# Patient Record
Sex: MALE | Race: WHITE | Employment: OTHER | URBAN - NONMETROPOLITAN AREA
[De-identification: names, ages, dates, MRNs, and addresses within clinical notes are randomized per-mention and may not be internally consistent; named-entity substitution may affect disease eponyms.]

---

## 2017-01-03 PROBLEM — N18.30 CHRONIC RENAL FAILURE, STAGE 3 (MODERATE) (HCC): Status: ACTIVE | Noted: 2017-01-03

## 2017-01-03 PROBLEM — R73.9 HYPERGLYCEMIA: Status: ACTIVE | Noted: 2017-01-03

## 2017-01-03 PROBLEM — E78.5 HYPERLIPIDEMIA: Status: ACTIVE | Noted: 2017-01-03

## 2017-03-29 ENCOUNTER — HOSPITAL ENCOUNTER (OUTPATIENT)
Age: 82
Discharge: HOME | End: 2017-03-29
Payer: MEDICARE

## 2017-03-29 DIAGNOSIS — E78.49 OTHER HYPERLIPIDEMIA: ICD-10-CM

## 2017-03-29 DIAGNOSIS — N18.30 CHRONIC RENAL FAILURE, STAGE 3 (MODERATE) (HCC): ICD-10-CM

## 2017-03-29 DIAGNOSIS — R73.9 HYPERGLYCEMIA: ICD-10-CM

## 2017-03-29 DIAGNOSIS — E03.8 OTHER SPECIFIED HYPOTHYROIDISM: ICD-10-CM

## 2017-03-29 LAB
ALT SERPL-CCNC: 18 U/L (ref 5–41)
ANION GAP SERPL CALCULATED.3IONS-SCNC: 14 MMOL/L (ref 9–17)
AST SERPL-CCNC: 21 U/L
BUN BLDV-MCNC: 29 MG/DL (ref 8–23)
BUN/CREAT BLD: 15 (ref 9–20)
CALCIUM SERPL-MCNC: 9.6 MG/DL (ref 8.6–10.4)
CHLORIDE BLD-SCNC: 109 MMOL/L (ref 98–107)
CO2: 23 MMOL/L (ref 20–31)
CREAT SERPL-MCNC: 1.95 MG/DL (ref 0.7–1.2)
CREATININE URINE: 70.3 MG/DL (ref 39–259)
ESTIMATED AVERAGE GLUCOSE: 134 MG/DL
GFR AFRICAN AMERICAN: 40 ML/MIN
GFR NON-AFRICAN AMERICAN: 33 ML/MIN
GFR SERPL CREATININE-BSD FRML MDRD: ABNORMAL ML/MIN/{1.73_M2}
GFR SERPL CREATININE-BSD FRML MDRD: ABNORMAL ML/MIN/{1.73_M2}
GLUCOSE BLD-MCNC: 126 MG/DL (ref 70–99)
HBA1C MFR BLD: 6.3 % (ref 4.8–5.9)
HCT VFR BLD CALC: 44.9 % (ref 41–53)
HEMOGLOBIN: 14.9 G/DL (ref 13.5–17)
HIGH SENSITIVE C-REACTIVE PROTEIN: 1.4 MG/L
MAGNESIUM: 2.2 MG/DL (ref 1.6–2.6)
MCH RBC QN AUTO: 32.6 PG (ref 26–34)
MCHC RBC AUTO-ENTMCNC: 33.3 G/DL (ref 31–37)
MCV RBC AUTO: 98 FL (ref 80–100)
MICROALBUMIN/CREAT 24H UR: <12 MG/L
MICROALBUMIN/CREAT UR-RTO: 17 MCG/MG CREAT
PDW BLD-RTO: 14.1 % (ref 12.1–15.2)
PHOSPHORUS: 3.7 MG/DL (ref 2.5–4.5)
PLATELET # BLD: 232 K/UL (ref 140–450)
PMV BLD AUTO: NORMAL FL (ref 6–12)
POTASSIUM SERPL-SCNC: 4.6 MMOL/L (ref 3.7–5.3)
RBC # BLD: 4.59 M/UL (ref 4.5–5.9)
SODIUM BLD-SCNC: 146 MMOL/L (ref 135–144)
T4 TOTAL: 7 UG/DL (ref 4.5–12)
TSH SERPL DL<=0.05 MIU/L-ACNC: 1.32 MIU/L (ref 0.3–5)
URIC ACID: 6.2 MG/DL (ref 3.4–7)
WBC # BLD: 9.9 K/UL (ref 3.5–11)

## 2017-03-29 PROCEDURE — 83735 ASSAY OF MAGNESIUM: CPT

## 2017-03-29 PROCEDURE — 84550 ASSAY OF BLOOD/URIC ACID: CPT

## 2017-03-29 PROCEDURE — 85027 COMPLETE CBC AUTOMATED: CPT

## 2017-03-29 PROCEDURE — 84450 TRANSFERASE (AST) (SGOT): CPT

## 2017-03-29 PROCEDURE — 86141 C-REACTIVE PROTEIN HS: CPT

## 2017-03-29 PROCEDURE — 80048 BASIC METABOLIC PNL TOTAL CA: CPT

## 2017-03-29 PROCEDURE — 84443 ASSAY THYROID STIM HORMONE: CPT

## 2017-03-29 PROCEDURE — 84100 ASSAY OF PHOSPHORUS: CPT

## 2017-03-29 PROCEDURE — 84436 ASSAY OF TOTAL THYROXINE: CPT

## 2017-03-29 PROCEDURE — 84460 ALANINE AMINO (ALT) (SGPT): CPT

## 2017-03-29 PROCEDURE — 36415 COLL VENOUS BLD VENIPUNCTURE: CPT

## 2017-03-29 PROCEDURE — 83036 HEMOGLOBIN GLYCOSYLATED A1C: CPT

## 2017-03-29 PROCEDURE — 82043 UR ALBUMIN QUANTITATIVE: CPT

## 2017-03-29 PROCEDURE — 82570 ASSAY OF URINE CREATININE: CPT

## 2017-06-26 ENCOUNTER — HOSPITAL ENCOUNTER (OUTPATIENT)
Age: 82
Discharge: HOME | End: 2017-06-26
Payer: MEDICARE

## 2017-06-26 DIAGNOSIS — E78.49 OTHER HYPERLIPIDEMIA: ICD-10-CM

## 2017-06-26 DIAGNOSIS — R73.9 HYPERGLYCEMIA: ICD-10-CM

## 2017-06-26 LAB
ALT SERPL-CCNC: 15 U/L (ref 5–41)
ANION GAP SERPL CALCULATED.3IONS-SCNC: 11 MMOL/L (ref 9–17)
AST SERPL-CCNC: 15 U/L
BUN BLDV-MCNC: 25 MG/DL (ref 8–23)
BUN/CREAT BLD: 12 (ref 9–20)
CALCIUM SERPL-MCNC: 9.5 MG/DL (ref 8.6–10.4)
CHLORIDE BLD-SCNC: 108 MMOL/L (ref 98–107)
CO2: 25 MMOL/L (ref 20–31)
CREAT SERPL-MCNC: 2.09 MG/DL (ref 0.7–1.2)
ESTIMATED AVERAGE GLUCOSE: 114 MG/DL
GFR AFRICAN AMERICAN: 37 ML/MIN
GFR NON-AFRICAN AMERICAN: 31 ML/MIN
GFR SERPL CREATININE-BSD FRML MDRD: ABNORMAL ML/MIN/{1.73_M2}
GFR SERPL CREATININE-BSD FRML MDRD: ABNORMAL ML/MIN/{1.73_M2}
GLUCOSE BLD-MCNC: 103 MG/DL (ref 70–99)
HBA1C MFR BLD: 5.6 % (ref 4.8–5.9)
HCT VFR BLD CALC: 47.2 % (ref 41–53)
HEMOGLOBIN: 15.9 G/DL (ref 13.5–17)
HIGH SENSITIVE C-REACTIVE PROTEIN: 0.6 MG/L
MCH RBC QN AUTO: 33.1 PG (ref 26–34)
MCHC RBC AUTO-ENTMCNC: 33.6 G/DL (ref 31–37)
MCV RBC AUTO: 98.4 FL (ref 80–100)
PDW BLD-RTO: 14.1 % (ref 12.1–15.2)
PLATELET # BLD: 216 K/UL (ref 140–450)
PMV BLD AUTO: NORMAL FL (ref 6–12)
POTASSIUM SERPL-SCNC: 5.1 MMOL/L (ref 3.7–5.3)
RBC # BLD: 4.8 M/UL (ref 4.5–5.9)
SODIUM BLD-SCNC: 144 MMOL/L (ref 135–144)
WBC # BLD: 9.7 K/UL (ref 3.5–11)

## 2017-06-26 PROCEDURE — 86141 C-REACTIVE PROTEIN HS: CPT

## 2017-06-26 PROCEDURE — 83036 HEMOGLOBIN GLYCOSYLATED A1C: CPT

## 2017-06-26 PROCEDURE — 84460 ALANINE AMINO (ALT) (SGPT): CPT

## 2017-06-26 PROCEDURE — 80048 BASIC METABOLIC PNL TOTAL CA: CPT

## 2017-06-26 PROCEDURE — 85027 COMPLETE CBC AUTOMATED: CPT

## 2017-06-26 PROCEDURE — 84450 TRANSFERASE (AST) (SGOT): CPT

## 2017-06-26 PROCEDURE — 36415 COLL VENOUS BLD VENIPUNCTURE: CPT

## 2017-07-05 PROBLEM — N40.0 BENIGN PROSTATIC HYPERPLASIA: Status: RESOLVED | Noted: 2017-07-05 | Resolved: 2017-07-05

## 2017-07-05 PROBLEM — K13.0 ANGULAR CHEILITIS: Status: RESOLVED | Noted: 2017-07-05 | Resolved: 2017-07-05

## 2017-07-05 PROBLEM — N40.0 BENIGN PROSTATIC HYPERPLASIA: Status: ACTIVE | Noted: 2017-07-05

## 2017-07-05 PROBLEM — N32.81 DETRUSOR INSTABILITY OF BLADDER: Status: ACTIVE | Noted: 2017-07-05

## 2017-07-05 PROBLEM — K13.0 ANGULAR CHEILITIS: Status: ACTIVE | Noted: 2017-07-05

## 2017-09-26 ENCOUNTER — HOSPITAL ENCOUNTER (OUTPATIENT)
Age: 82
Discharge: HOME | End: 2017-09-26
Payer: MEDICARE

## 2017-09-26 DIAGNOSIS — R73.9 HYPERGLYCEMIA: ICD-10-CM

## 2017-09-26 DIAGNOSIS — E78.5 HYPERLIPIDEMIA, UNSPECIFIED: ICD-10-CM

## 2017-09-26 LAB
ALT SERPL-CCNC: 15 U/L (ref 5–41)
ANION GAP SERPL CALCULATED.3IONS-SCNC: 13 MMOL/L (ref 9–17)
AST SERPL-CCNC: 16 U/L
BUN BLDV-MCNC: 31 MG/DL (ref 8–23)
BUN/CREAT BLD: 16 (ref 9–20)
CALCIUM SERPL-MCNC: 9.5 MG/DL (ref 8.6–10.4)
CHLORIDE BLD-SCNC: 107 MMOL/L (ref 98–107)
CHOLESTEROL/HDL RATIO: 4
CHOLESTEROL: 119 MG/DL
CO2: 26 MMOL/L (ref 20–31)
CREAT SERPL-MCNC: 2 MG/DL (ref 0.7–1.2)
GFR AFRICAN AMERICAN: 39 ML/MIN
GFR NON-AFRICAN AMERICAN: 32 ML/MIN
GFR SERPL CREATININE-BSD FRML MDRD: ABNORMAL ML/MIN/{1.73_M2}
GFR SERPL CREATININE-BSD FRML MDRD: ABNORMAL ML/MIN/{1.73_M2}
GLUCOSE BLD-MCNC: 79 MG/DL (ref 70–99)
HCT VFR BLD CALC: 47.9 % (ref 41–53)
HDLC SERPL-MCNC: 30 MG/DL
HEMOGLOBIN: 15.8 G/DL (ref 13.5–17)
HIGH SENSITIVE C-REACTIVE PROTEIN: 0.8 MG/L
LDL CHOLESTEROL: 53 MG/DL (ref 0–130)
MCH RBC QN AUTO: 33 PG (ref 26–34)
MCHC RBC AUTO-ENTMCNC: 33 G/DL (ref 31–37)
MCV RBC AUTO: 100.1 FL (ref 80–100)
PDW BLD-RTO: 14.1 % (ref 12.1–15.2)
PLATELET # BLD: 252 K/UL (ref 140–450)
PMV BLD AUTO: 9.5 FL (ref 6–12)
POTASSIUM SERPL-SCNC: 4.8 MMOL/L (ref 3.7–5.3)
RBC # BLD: 4.79 M/UL (ref 4.5–5.9)
SODIUM BLD-SCNC: 146 MMOL/L (ref 135–144)
TRIGL SERPL-MCNC: 178 MG/DL
VLDLC SERPL CALC-MCNC: ABNORMAL MG/DL (ref 1–30)
WBC # BLD: 10.5 K/UL (ref 3.5–11)

## 2017-09-26 PROCEDURE — 85027 COMPLETE CBC AUTOMATED: CPT

## 2017-09-26 PROCEDURE — 36415 COLL VENOUS BLD VENIPUNCTURE: CPT

## 2017-09-26 PROCEDURE — 80048 BASIC METABOLIC PNL TOTAL CA: CPT

## 2017-09-26 PROCEDURE — 86141 C-REACTIVE PROTEIN HS: CPT

## 2017-09-26 PROCEDURE — 84460 ALANINE AMINO (ALT) (SGPT): CPT

## 2017-09-26 PROCEDURE — 84450 TRANSFERASE (AST) (SGOT): CPT

## 2017-09-26 PROCEDURE — 80061 LIPID PANEL: CPT

## 2017-10-04 PROBLEM — I51.7 LVH (LEFT VENTRICULAR HYPERTROPHY): Status: ACTIVE | Noted: 2017-10-04

## 2017-10-18 ENCOUNTER — OFFICE VISIT (OUTPATIENT)
Dept: UROLOGY | Age: 82
End: 2017-10-18
Payer: MEDICARE

## 2017-10-18 VITALS
BODY MASS INDEX: 31.98 KG/M2 | WEIGHT: 211 LBS | SYSTOLIC BLOOD PRESSURE: 118 MMHG | HEIGHT: 68 IN | DIASTOLIC BLOOD PRESSURE: 62 MMHG

## 2017-10-18 DIAGNOSIS — R33.8 BENIGN PROSTATIC HYPERPLASIA WITH URINARY RETENTION: Primary | ICD-10-CM

## 2017-10-18 DIAGNOSIS — N40.1 BENIGN PROSTATIC HYPERPLASIA WITH URINARY RETENTION: Primary | ICD-10-CM

## 2017-10-18 PROCEDURE — 4040F PNEUMOC VAC/ADMIN/RCVD: CPT | Performed by: UROLOGY

## 2017-10-18 PROCEDURE — G8598 ASA/ANTIPLAT THER USED: HCPCS | Performed by: UROLOGY

## 2017-10-18 PROCEDURE — 99213 OFFICE O/P EST LOW 20 MIN: CPT | Performed by: UROLOGY

## 2017-10-18 PROCEDURE — G8417 CALC BMI ABV UP PARAM F/U: HCPCS | Performed by: UROLOGY

## 2017-10-18 PROCEDURE — 51798 US URINE CAPACITY MEASURE: CPT | Performed by: UROLOGY

## 2017-10-18 PROCEDURE — 1036F TOBACCO NON-USER: CPT | Performed by: UROLOGY

## 2017-10-18 PROCEDURE — G8484 FLU IMMUNIZE NO ADMIN: HCPCS | Performed by: UROLOGY

## 2017-10-18 PROCEDURE — G8427 DOCREV CUR MEDS BY ELIG CLIN: HCPCS | Performed by: UROLOGY

## 2017-10-18 PROCEDURE — 1123F ACP DISCUSS/DSCN MKR DOCD: CPT | Performed by: UROLOGY

## 2017-10-18 ASSESSMENT — ENCOUNTER SYMPTOMS
ABDOMINAL PAIN: 0
COLOR CHANGE: 0
COUGH: 0
VOMITING: 0
SHORTNESS OF BREATH: 0
EYE REDNESS: 0
WHEEZING: 0
EYE PAIN: 0
BACK PAIN: 0
NAUSEA: 0

## 2017-10-18 NOTE — PROGRESS NOTES
Subjective:      Patient ID: Lyndsay Starkey is a 80 y.o. male. HPI  Patient is a 80 y.o. male in no acute distress and alert and oriented to person, place and time. History  Difficulty voiding after open heart surgery Fall 2015.  ml after not voiding for 2.5 hours. We started Flomax 10/5/15. Pt had symptom improvement and PVR improvement (306 ml), Crt remained fairly stable/elevated. Added Proscar 10/19/15. Encouraged double voiding for incomplete bladder emptying. Currently  Patient here today for an annual follow-up. Patient does continue to take Flomax and Proscar. Patient did have an elevated scan today 350 mL. He was unable to void did state he voided approximately 30 minutes ago. Patient's renal function has slightly worsened. Creatinine last year was 1.8 creatinine now is 2.00. IPSS Questionnaire (AUA-7): Over the past month    1)  How often have you had a sensation of not emptying your bladder completely after you finish urinating? 1 - Less than 1 time in 5   2)  How often have you had to urinate again less than two hours after you finished urinating? 0 - Not at all   3)  How often have you found you stopped and started again several times when you urinated? 0 - Not at all   4) How difficult have you found it to postpone urination? 1 - Less than 1 time in 5   5) How often have you had a weak urinary stream?  0 - Not at all   6) How often have you had to push or strain to begin urination? 2 - Less than half the time   7) How many times did you most typically get up to urinate from the time you went to bed until the time you got up in the morning?   3 - 3 times   Total 7   QOL 2     Past Medical History:   Diagnosis Date    Acute renal insufficiency 10/5/2015    Anxiety 10/14/2015    ASHD (arteriosclerotic heart disease) 10/14/2015    Bipolar disorder ([facility] [address] 1976    Essential hypertension 9/23/2016    Hyperlipidemia     Hypothyroidism     Localized osteoarthrosis,

## 2017-10-30 RX ORDER — TAMSULOSIN HYDROCHLORIDE 0.4 MG/1
0.4 CAPSULE ORAL EVERY EVENING
Qty: 30 CAPSULE | Refills: 11 | Status: SHIPPED | OUTPATIENT
Start: 2017-10-30 | End: 2018-09-25 | Stop reason: SDUPTHER

## 2017-10-30 RX ORDER — FINASTERIDE 5 MG/1
5 TABLET, FILM COATED ORAL DAILY
Qty: 30 TABLET | Refills: 11 | Status: SHIPPED | OUTPATIENT
Start: 2017-10-30 | End: 2018-10-22 | Stop reason: SDUPTHER

## 2017-11-15 ENCOUNTER — HOSPITAL ENCOUNTER (OUTPATIENT)
Dept: NON INVASIVE DIAGNOSTICS | Age: 82
Discharge: HOME | End: 2017-11-15
Payer: MEDICARE

## 2017-11-15 DIAGNOSIS — I51.7 LVH (LEFT VENTRICULAR HYPERTROPHY): ICD-10-CM

## 2017-11-15 LAB
LV EF: 60 %
LVEF MODALITY: NORMAL

## 2017-11-15 PROCEDURE — 93306 TTE W/DOPPLER COMPLETE: CPT

## 2017-11-17 NOTE — PROGRESS NOTES
Notify mild enlargemnt of the left ventricle  This is likely not significant at this time and not any significant change since 2015

## 2017-11-22 RX ORDER — LEVOTHYROXINE SODIUM 88 UG/1
88 TABLET ORAL DAILY
Qty: 90 TABLET | Refills: 3 | Status: SHIPPED | OUTPATIENT
Start: 2017-11-22 | End: 2018-08-21 | Stop reason: SDUPTHER

## 2017-12-28 ENCOUNTER — HOSPITAL ENCOUNTER (OUTPATIENT)
Age: 82
Discharge: HOME | End: 2017-12-28
Payer: MEDICARE

## 2017-12-28 DIAGNOSIS — E78.5 HYPERLIPIDEMIA, UNSPECIFIED HYPERLIPIDEMIA TYPE: ICD-10-CM

## 2017-12-28 LAB
ALT SERPL-CCNC: 13 U/L (ref 5–41)
ANION GAP SERPL CALCULATED.3IONS-SCNC: 11 MMOL/L (ref 9–17)
AST SERPL-CCNC: 18 U/L
BUN BLDV-MCNC: 27 MG/DL (ref 8–23)
BUN/CREAT BLD: 14 (ref 9–20)
CALCIUM SERPL-MCNC: 9.4 MG/DL (ref 8.6–10.4)
CHLORIDE BLD-SCNC: 108 MMOL/L (ref 98–107)
CO2: 27 MMOL/L (ref 20–31)
CREAT SERPL-MCNC: 1.91 MG/DL (ref 0.7–1.2)
GFR AFRICAN AMERICAN: 41 ML/MIN
GFR NON-AFRICAN AMERICAN: 34 ML/MIN
GFR SERPL CREATININE-BSD FRML MDRD: ABNORMAL ML/MIN/{1.73_M2}
GFR SERPL CREATININE-BSD FRML MDRD: ABNORMAL ML/MIN/{1.73_M2}
GLUCOSE BLD-MCNC: 88 MG/DL (ref 70–99)
HCT VFR BLD CALC: 50.7 % (ref 41–53)
HEMOGLOBIN: 16.5 G/DL (ref 13.5–17)
HIGH SENSITIVE C-REACTIVE PROTEIN: 0.8 MG/L
MCH RBC QN AUTO: 32.5 PG (ref 26–34)
MCHC RBC AUTO-ENTMCNC: 32.5 G/DL (ref 31–37)
MCV RBC AUTO: 100 FL (ref 80–100)
PDW BLD-RTO: 13.9 % (ref 12.1–15.2)
PLATELET # BLD: 242 K/UL (ref 140–450)
PMV BLD AUTO: 9.6 FL (ref 6–12)
POTASSIUM SERPL-SCNC: 5.2 MMOL/L (ref 3.7–5.3)
RBC # BLD: 5.07 M/UL (ref 4.5–5.9)
SODIUM BLD-SCNC: 146 MMOL/L (ref 135–144)
WBC # BLD: 10.6 K/UL (ref 3.5–11)

## 2017-12-28 PROCEDURE — 85027 COMPLETE CBC AUTOMATED: CPT

## 2017-12-28 PROCEDURE — 84460 ALANINE AMINO (ALT) (SGPT): CPT

## 2017-12-28 PROCEDURE — 80048 BASIC METABOLIC PNL TOTAL CA: CPT

## 2017-12-28 PROCEDURE — 86141 C-REACTIVE PROTEIN HS: CPT

## 2017-12-28 PROCEDURE — 36415 COLL VENOUS BLD VENIPUNCTURE: CPT

## 2017-12-28 PROCEDURE — 84450 TRANSFERASE (AST) (SGOT): CPT

## 2018-01-02 ENCOUNTER — OFFICE VISIT (OUTPATIENT)
Dept: FAMILY MEDICINE CLINIC | Age: 83
End: 2018-01-02
Payer: MEDICARE

## 2018-01-02 VITALS
SYSTOLIC BLOOD PRESSURE: 112 MMHG | WEIGHT: 213 LBS | HEIGHT: 66 IN | BODY MASS INDEX: 34.23 KG/M2 | DIASTOLIC BLOOD PRESSURE: 62 MMHG

## 2018-01-02 DIAGNOSIS — E78.5 HYPERLIPIDEMIA, UNSPECIFIED HYPERLIPIDEMIA TYPE: ICD-10-CM

## 2018-01-02 DIAGNOSIS — I25.10 ASHD (ARTERIOSCLEROTIC HEART DISEASE): ICD-10-CM

## 2018-01-02 DIAGNOSIS — I10 ESSENTIAL HYPERTENSION: ICD-10-CM

## 2018-01-02 DIAGNOSIS — N18.30 CHRONIC RENAL FAILURE, STAGE 3 (MODERATE) (HCC): ICD-10-CM

## 2018-01-02 DIAGNOSIS — R73.9 HYPERGLYCEMIA: ICD-10-CM

## 2018-01-02 DIAGNOSIS — E03.8 OTHER SPECIFIED HYPOTHYROIDISM: Primary | ICD-10-CM

## 2018-01-02 PROCEDURE — 1036F TOBACCO NON-USER: CPT | Performed by: FAMILY MEDICINE

## 2018-01-02 PROCEDURE — G8427 DOCREV CUR MEDS BY ELIG CLIN: HCPCS | Performed by: FAMILY MEDICINE

## 2018-01-02 PROCEDURE — G8484 FLU IMMUNIZE NO ADMIN: HCPCS | Performed by: FAMILY MEDICINE

## 2018-01-02 PROCEDURE — G8598 ASA/ANTIPLAT THER USED: HCPCS | Performed by: FAMILY MEDICINE

## 2018-01-02 PROCEDURE — 99214 OFFICE O/P EST MOD 30 MIN: CPT | Performed by: FAMILY MEDICINE

## 2018-01-02 PROCEDURE — G8417 CALC BMI ABV UP PARAM F/U: HCPCS | Performed by: FAMILY MEDICINE

## 2018-01-02 PROCEDURE — 4040F PNEUMOC VAC/ADMIN/RCVD: CPT | Performed by: FAMILY MEDICINE

## 2018-01-02 PROCEDURE — 1123F ACP DISCUSS/DSCN MKR DOCD: CPT | Performed by: FAMILY MEDICINE

## 2018-01-02 ASSESSMENT — PATIENT HEALTH QUESTIONNAIRE - PHQ9
2. FEELING DOWN, DEPRESSED OR HOPELESS: 0
1. LITTLE INTEREST OR PLEASURE IN DOING THINGS: 0
SUM OF ALL RESPONSES TO PHQ9 QUESTIONS 1 & 2: 0
SUM OF ALL RESPONSES TO PHQ QUESTIONS 1-9: 0

## 2018-01-02 NOTE — PATIENT INSTRUCTIONS
PLAN:  He still follows up with Dr. Laura Pollack. I review the Echo doppler from 11/2017 which didn't show any significant changes since 2015. I review his lab results. His GFR has been in the low-mid 30's for the last several years. At his age, this is okay. He is also still seeing Dr. Jackie Delgado every 3 months. He has not made any recent changes. I encourage him to continue to stay hydrated during the winter months and avoid others that are sick. He is doing well. I will see him back in 3 months.

## 2018-04-04 ENCOUNTER — HOSPITAL ENCOUNTER (OUTPATIENT)
Age: 83
Discharge: HOME | End: 2018-04-04
Payer: MEDICARE

## 2018-04-04 DIAGNOSIS — E78.5 HYPERLIPIDEMIA, UNSPECIFIED HYPERLIPIDEMIA TYPE: ICD-10-CM

## 2018-04-04 LAB
ALT SERPL-CCNC: 12 U/L (ref 5–41)
ANION GAP SERPL CALCULATED.3IONS-SCNC: 9 MMOL/L (ref 9–17)
AST SERPL-CCNC: 16 U/L
BUN BLDV-MCNC: 25 MG/DL (ref 8–23)
BUN/CREAT BLD: 12 (ref 9–20)
CALCIUM SERPL-MCNC: 9.1 MG/DL (ref 8.6–10.4)
CHLORIDE BLD-SCNC: 105 MMOL/L (ref 98–107)
CO2: 27 MMOL/L (ref 20–31)
CREAT SERPL-MCNC: 2.02 MG/DL (ref 0.7–1.2)
GFR AFRICAN AMERICAN: 38 ML/MIN
GFR NON-AFRICAN AMERICAN: 32 ML/MIN
GFR SERPL CREATININE-BSD FRML MDRD: ABNORMAL ML/MIN/{1.73_M2}
GFR SERPL CREATININE-BSD FRML MDRD: ABNORMAL ML/MIN/{1.73_M2}
GLUCOSE BLD-MCNC: 91 MG/DL (ref 70–99)
HCT VFR BLD CALC: 47.5 % (ref 40.7–50.3)
HEMOGLOBIN: 15.4 G/DL (ref 13–17)
MCH RBC QN AUTO: 33 PG (ref 25.2–33.5)
MCHC RBC AUTO-ENTMCNC: 32.4 G/DL (ref 28.4–34.8)
MCV RBC AUTO: 101.9 FL (ref 82.6–102.9)
NRBC AUTOMATED: 0 PER 100 WBC
PDW BLD-RTO: 13.1 % (ref 11.8–14.4)
PLATELET # BLD: 222 K/UL (ref 138–453)
PMV BLD AUTO: 11 FL (ref 8.1–13.5)
POTASSIUM SERPL-SCNC: 5.3 MMOL/L (ref 3.7–5.3)
RBC # BLD: 4.66 M/UL (ref 4.21–5.77)
SODIUM BLD-SCNC: 141 MMOL/L (ref 135–144)
WBC # BLD: 9.2 K/UL (ref 3.5–11.3)

## 2018-04-04 PROCEDURE — 80048 BASIC METABOLIC PNL TOTAL CA: CPT

## 2018-04-04 PROCEDURE — 36415 COLL VENOUS BLD VENIPUNCTURE: CPT

## 2018-04-04 PROCEDURE — 84450 TRANSFERASE (AST) (SGOT): CPT

## 2018-04-04 PROCEDURE — 85027 COMPLETE CBC AUTOMATED: CPT

## 2018-04-04 PROCEDURE — 84460 ALANINE AMINO (ALT) (SGPT): CPT

## 2018-04-11 ENCOUNTER — OFFICE VISIT (OUTPATIENT)
Dept: FAMILY MEDICINE CLINIC | Age: 83
End: 2018-04-11
Payer: MEDICARE

## 2018-04-11 VITALS
BODY MASS INDEX: 34.55 KG/M2 | HEIGHT: 66 IN | DIASTOLIC BLOOD PRESSURE: 60 MMHG | SYSTOLIC BLOOD PRESSURE: 106 MMHG | WEIGHT: 215 LBS

## 2018-04-11 DIAGNOSIS — E78.5 HYPERLIPIDEMIA, UNSPECIFIED HYPERLIPIDEMIA TYPE: ICD-10-CM

## 2018-04-11 DIAGNOSIS — E03.8 OTHER SPECIFIED HYPOTHYROIDISM: ICD-10-CM

## 2018-04-11 DIAGNOSIS — I25.10 ASHD (ARTERIOSCLEROTIC HEART DISEASE): Primary | ICD-10-CM

## 2018-04-11 DIAGNOSIS — I10 ESSENTIAL HYPERTENSION: ICD-10-CM

## 2018-04-11 DIAGNOSIS — R00.1 BRADYCARDIA: ICD-10-CM

## 2018-04-11 DIAGNOSIS — N18.30 CHRONIC RENAL FAILURE, STAGE 3 (MODERATE) (HCC): ICD-10-CM

## 2018-04-11 PROCEDURE — 93000 ELECTROCARDIOGRAM COMPLETE: CPT | Performed by: FAMILY MEDICINE

## 2018-04-11 PROCEDURE — G8598 ASA/ANTIPLAT THER USED: HCPCS | Performed by: FAMILY MEDICINE

## 2018-04-11 PROCEDURE — 99214 OFFICE O/P EST MOD 30 MIN: CPT | Performed by: FAMILY MEDICINE

## 2018-04-11 PROCEDURE — 4040F PNEUMOC VAC/ADMIN/RCVD: CPT | Performed by: FAMILY MEDICINE

## 2018-04-11 PROCEDURE — G8417 CALC BMI ABV UP PARAM F/U: HCPCS | Performed by: FAMILY MEDICINE

## 2018-04-11 PROCEDURE — 1123F ACP DISCUSS/DSCN MKR DOCD: CPT | Performed by: FAMILY MEDICINE

## 2018-04-11 PROCEDURE — 1036F TOBACCO NON-USER: CPT | Performed by: FAMILY MEDICINE

## 2018-04-11 PROCEDURE — G8427 DOCREV CUR MEDS BY ELIG CLIN: HCPCS | Performed by: FAMILY MEDICINE

## 2018-04-11 RX ORDER — METOPROLOL SUCCINATE 25 MG/1
12.5 TABLET, EXTENDED RELEASE ORAL DAILY
Qty: 90 TABLET | Refills: 3 | Status: SHIPPED | OUTPATIENT
Start: 2018-04-11 | End: 2019-06-17 | Stop reason: SDUPTHER

## 2018-04-11 RX ORDER — ASCORBIC ACID 500 MG
1000 TABLET ORAL 2 TIMES DAILY
COMMUNITY

## 2018-04-11 RX ORDER — BUPROPION HYDROCHLORIDE 100 MG/1
100 TABLET, EXTENDED RELEASE ORAL DAILY
COMMUNITY
Start: 2018-03-26 | End: 2022-03-09

## 2018-04-11 ASSESSMENT — PATIENT HEALTH QUESTIONNAIRE - PHQ9
1. LITTLE INTEREST OR PLEASURE IN DOING THINGS: 0
SUM OF ALL RESPONSES TO PHQ9 QUESTIONS 1 & 2: 0
2. FEELING DOWN, DEPRESSED OR HOPELESS: 0
SUM OF ALL RESPONSES TO PHQ QUESTIONS 1-9: 0

## 2018-04-23 ENCOUNTER — OFFICE VISIT (OUTPATIENT)
Dept: UROLOGY | Age: 83
End: 2018-04-23
Payer: MEDICARE

## 2018-04-23 ENCOUNTER — HOSPITAL ENCOUNTER (OUTPATIENT)
Age: 83
Setting detail: SPECIMEN
Discharge: HOME | End: 2018-04-23
Payer: MEDICARE

## 2018-04-23 VITALS
HEIGHT: 67 IN | SYSTOLIC BLOOD PRESSURE: 112 MMHG | BODY MASS INDEX: 32.65 KG/M2 | WEIGHT: 208 LBS | DIASTOLIC BLOOD PRESSURE: 68 MMHG

## 2018-04-23 DIAGNOSIS — N40.1 BENIGN PROSTATIC HYPERPLASIA WITH URINARY RETENTION: Primary | ICD-10-CM

## 2018-04-23 DIAGNOSIS — N18.30 CHRONIC RENAL FAILURE, STAGE 3 (MODERATE) (HCC): ICD-10-CM

## 2018-04-23 DIAGNOSIS — R33.8 BENIGN PROSTATIC HYPERPLASIA WITH URINARY RETENTION: ICD-10-CM

## 2018-04-23 DIAGNOSIS — R33.8 BENIGN PROSTATIC HYPERPLASIA WITH URINARY RETENTION: Primary | ICD-10-CM

## 2018-04-23 DIAGNOSIS — N40.1 BENIGN PROSTATIC HYPERPLASIA WITH URINARY RETENTION: ICD-10-CM

## 2018-04-23 LAB
-: ABNORMAL
AMORPHOUS: ABNORMAL
BACTERIA: ABNORMAL
BILIRUBIN URINE: NEGATIVE
CASTS UA: ABNORMAL /LPF
COLOR: YELLOW
COMMENT UA: ABNORMAL
CRYSTALS, UA: ABNORMAL /HPF
EPITHELIAL CELLS UA: ABNORMAL /HPF (ref 0–5)
GLUCOSE URINE: NEGATIVE
KETONES, URINE: NEGATIVE
LEUKOCYTE ESTERASE, URINE: NEGATIVE
MUCUS: ABNORMAL
NITRITE, URINE: NEGATIVE
OTHER OBSERVATIONS UA: ABNORMAL
PH UA: 6.5 (ref 5–9)
PROTEIN UA: NEGATIVE
RBC UA: ABNORMAL /HPF (ref 0–2)
RENAL EPITHELIAL, UA: ABNORMAL /HPF
SPECIFIC GRAVITY UA: <1.005 (ref 1.01–1.02)
TRICHOMONAS: ABNORMAL
TURBIDITY: CLEAR
URINE HGB: NEGATIVE
UROBILINOGEN, URINE: NORMAL
WBC UA: ABNORMAL /HPF (ref 0–5)
YEAST: ABNORMAL

## 2018-04-23 PROCEDURE — 1036F TOBACCO NON-USER: CPT | Performed by: UROLOGY

## 2018-04-23 PROCEDURE — 87086 URINE CULTURE/COLONY COUNT: CPT

## 2018-04-23 PROCEDURE — 1123F ACP DISCUSS/DSCN MKR DOCD: CPT | Performed by: UROLOGY

## 2018-04-23 PROCEDURE — 81001 URINALYSIS AUTO W/SCOPE: CPT

## 2018-04-23 PROCEDURE — 4040F PNEUMOC VAC/ADMIN/RCVD: CPT | Performed by: UROLOGY

## 2018-04-23 PROCEDURE — G8427 DOCREV CUR MEDS BY ELIG CLIN: HCPCS | Performed by: UROLOGY

## 2018-04-23 PROCEDURE — 99213 OFFICE O/P EST LOW 20 MIN: CPT | Performed by: UROLOGY

## 2018-04-23 PROCEDURE — G8598 ASA/ANTIPLAT THER USED: HCPCS | Performed by: UROLOGY

## 2018-04-23 PROCEDURE — G8417 CALC BMI ABV UP PARAM F/U: HCPCS | Performed by: UROLOGY

## 2018-04-23 ASSESSMENT — ENCOUNTER SYMPTOMS
NAUSEA: 0
COLOR CHANGE: 0
ABDOMINAL PAIN: 0
EYE PAIN: 0
BACK PAIN: 0
VOMITING: 0
WHEEZING: 0
COUGH: 0
SHORTNESS OF BREATH: 0
EYE REDNESS: 0

## 2018-04-24 LAB
CULTURE: NO GROWTH
CULTURE: NORMAL
Lab: NORMAL
Lab: NORMAL
SPECIMEN DESCRIPTION: NORMAL
SPECIMEN DESCRIPTION: NORMAL
STATUS: NORMAL

## 2018-08-13 ENCOUNTER — HOSPITAL ENCOUNTER (OUTPATIENT)
Age: 83
Discharge: HOME | End: 2018-08-13
Payer: MEDICARE

## 2018-08-13 DIAGNOSIS — E78.5 HYPERLIPIDEMIA, UNSPECIFIED HYPERLIPIDEMIA TYPE: ICD-10-CM

## 2018-08-13 DIAGNOSIS — E03.8 OTHER SPECIFIED HYPOTHYROIDISM: ICD-10-CM

## 2018-08-13 LAB
CHOLESTEROL/HDL RATIO: 3.5
CHOLESTEROL: 95 MG/DL
HDLC SERPL-MCNC: 27 MG/DL
HIGH SENSITIVE C-REACTIVE PROTEIN: 1 MG/L
LDL CHOLESTEROL: 47 MG/DL (ref 0–130)
T4 TOTAL: 6.5 UG/DL (ref 4.5–12)
TRIGL SERPL-MCNC: 106 MG/DL
TSH SERPL DL<=0.05 MIU/L-ACNC: 1.75 MIU/L (ref 0.3–5)
VLDLC SERPL CALC-MCNC: ABNORMAL MG/DL (ref 1–30)

## 2018-08-13 PROCEDURE — 84443 ASSAY THYROID STIM HORMONE: CPT

## 2018-08-13 PROCEDURE — 84436 ASSAY OF TOTAL THYROXINE: CPT

## 2018-08-13 PROCEDURE — 80061 LIPID PANEL: CPT

## 2018-08-13 PROCEDURE — 36415 COLL VENOUS BLD VENIPUNCTURE: CPT

## 2018-08-13 PROCEDURE — 86141 C-REACTIVE PROTEIN HS: CPT

## 2018-08-21 ENCOUNTER — OFFICE VISIT (OUTPATIENT)
Dept: FAMILY MEDICINE CLINIC | Age: 83
End: 2018-08-21
Payer: MEDICARE

## 2018-08-21 VITALS
WEIGHT: 202.6 LBS | DIASTOLIC BLOOD PRESSURE: 58 MMHG | BODY MASS INDEX: 32.56 KG/M2 | SYSTOLIC BLOOD PRESSURE: 102 MMHG | HEIGHT: 66 IN

## 2018-08-21 DIAGNOSIS — R73.9 HYPERGLYCEMIA: ICD-10-CM

## 2018-08-21 DIAGNOSIS — I25.10 ASHD (ARTERIOSCLEROTIC HEART DISEASE): Primary | ICD-10-CM

## 2018-08-21 DIAGNOSIS — E78.5 HYPERLIPIDEMIA, UNSPECIFIED HYPERLIPIDEMIA TYPE: ICD-10-CM

## 2018-08-21 DIAGNOSIS — E03.8 OTHER SPECIFIED HYPOTHYROIDISM: ICD-10-CM

## 2018-08-21 PROCEDURE — 99214 OFFICE O/P EST MOD 30 MIN: CPT | Performed by: FAMILY MEDICINE

## 2018-08-21 PROCEDURE — 1101F PT FALLS ASSESS-DOCD LE1/YR: CPT | Performed by: FAMILY MEDICINE

## 2018-08-21 PROCEDURE — G8417 CALC BMI ABV UP PARAM F/U: HCPCS | Performed by: FAMILY MEDICINE

## 2018-08-21 PROCEDURE — 4040F PNEUMOC VAC/ADMIN/RCVD: CPT | Performed by: FAMILY MEDICINE

## 2018-08-21 PROCEDURE — 1123F ACP DISCUSS/DSCN MKR DOCD: CPT | Performed by: FAMILY MEDICINE

## 2018-08-21 PROCEDURE — G8598 ASA/ANTIPLAT THER USED: HCPCS | Performed by: FAMILY MEDICINE

## 2018-08-21 PROCEDURE — 1036F TOBACCO NON-USER: CPT | Performed by: FAMILY MEDICINE

## 2018-08-21 PROCEDURE — G8427 DOCREV CUR MEDS BY ELIG CLIN: HCPCS | Performed by: FAMILY MEDICINE

## 2018-08-21 RX ORDER — LEVOTHYROXINE SODIUM 88 UG/1
88 TABLET ORAL DAILY
Qty: 90 TABLET | Refills: 3 | Status: SHIPPED | OUTPATIENT
Start: 2018-08-21 | End: 2019-11-26 | Stop reason: SDUPTHER

## 2018-08-21 ASSESSMENT — PATIENT HEALTH QUESTIONNAIRE - PHQ9
SUM OF ALL RESPONSES TO PHQ9 QUESTIONS 1 & 2: 0
1. LITTLE INTEREST OR PLEASURE IN DOING THINGS: 0
SUM OF ALL RESPONSES TO PHQ QUESTIONS 1-9: 0
SUM OF ALL RESPONSES TO PHQ QUESTIONS 1-9: 0
2. FEELING DOWN, DEPRESSED OR HOPELESS: 0

## 2018-08-21 NOTE — PATIENT INSTRUCTIONS
PLAN:  I review his lab results. I am pleased with these numbers overall. His heart sounds good. He looks good and is doing well. It is recommended that he get the new shingles vaccine at the local pharmacy or the health department. SURVEY:    You may be receiving a survey from [employer] regarding your visit today. Please complete the survey to enable us to provide the highest quality of care to you and your family. If you cannot score us a very good on any question, please call the office to discuss how we could of made your experience a very good one. Thank you.

## 2018-09-25 RX ORDER — TAMSULOSIN HYDROCHLORIDE 0.4 MG/1
0.4 CAPSULE ORAL EVERY EVENING
Qty: 30 CAPSULE | Refills: 2 | Status: SHIPPED | OUTPATIENT
Start: 2018-09-25 | End: 2018-10-22 | Stop reason: SDUPTHER

## 2018-10-16 ENCOUNTER — NURSE ONLY (OUTPATIENT)
Dept: FAMILY MEDICINE CLINIC | Age: 83
End: 2018-10-16
Payer: MEDICARE

## 2018-10-16 DIAGNOSIS — Z23 NEED FOR INFLUENZA VACCINATION: Primary | ICD-10-CM

## 2018-10-16 PROCEDURE — G0008 ADMIN INFLUENZA VIRUS VAC: HCPCS | Performed by: FAMILY MEDICINE

## 2018-10-16 PROCEDURE — 90662 IIV NO PRSV INCREASED AG IM: CPT | Performed by: FAMILY MEDICINE

## 2018-10-16 NOTE — PROGRESS NOTES
The patient is given the influenza vaccination in the office today. See Immunization Record for details.

## 2018-10-19 ENCOUNTER — HOSPITAL ENCOUNTER (OUTPATIENT)
Age: 83
Discharge: HOME | End: 2018-10-19
Payer: MEDICARE

## 2018-10-19 DIAGNOSIS — N40.1 BENIGN PROSTATIC HYPERPLASIA WITH URINARY RETENTION: ICD-10-CM

## 2018-10-19 DIAGNOSIS — R33.8 BENIGN PROSTATIC HYPERPLASIA WITH URINARY RETENTION: ICD-10-CM

## 2018-10-19 DIAGNOSIS — N18.30 CHRONIC RENAL FAILURE, STAGE 3 (MODERATE) (HCC): ICD-10-CM

## 2018-10-19 LAB
ANION GAP SERPL CALCULATED.3IONS-SCNC: 10 MMOL/L (ref 9–17)
BUN BLDV-MCNC: 22 MG/DL (ref 8–23)
BUN/CREAT BLD: 11 (ref 9–20)
CALCIUM SERPL-MCNC: 9 MG/DL (ref 8.6–10.4)
CHLORIDE BLD-SCNC: 108 MMOL/L (ref 98–107)
CO2: 25 MMOL/L (ref 20–31)
CREAT SERPL-MCNC: 1.96 MG/DL (ref 0.7–1.2)
GFR AFRICAN AMERICAN: 40 ML/MIN
GFR NON-AFRICAN AMERICAN: 33 ML/MIN
GFR SERPL CREATININE-BSD FRML MDRD: ABNORMAL ML/MIN/{1.73_M2}
GFR SERPL CREATININE-BSD FRML MDRD: ABNORMAL ML/MIN/{1.73_M2}
GLUCOSE BLD-MCNC: 159 MG/DL (ref 70–99)
POTASSIUM SERPL-SCNC: 4.6 MMOL/L (ref 3.7–5.3)
SODIUM BLD-SCNC: 143 MMOL/L (ref 135–144)

## 2018-10-19 PROCEDURE — 80048 BASIC METABOLIC PNL TOTAL CA: CPT

## 2018-10-19 PROCEDURE — 36415 COLL VENOUS BLD VENIPUNCTURE: CPT

## 2018-10-22 ENCOUNTER — OFFICE VISIT (OUTPATIENT)
Dept: UROLOGY | Age: 83
End: 2018-10-22
Payer: MEDICARE

## 2018-10-22 VITALS — SYSTOLIC BLOOD PRESSURE: 116 MMHG | BODY MASS INDEX: 32.44 KG/M2 | WEIGHT: 201 LBS | DIASTOLIC BLOOD PRESSURE: 58 MMHG

## 2018-10-22 DIAGNOSIS — R33.8 BENIGN PROSTATIC HYPERPLASIA WITH URINARY RETENTION: Primary | ICD-10-CM

## 2018-10-22 DIAGNOSIS — N40.1 BENIGN PROSTATIC HYPERPLASIA WITH URINARY RETENTION: Primary | ICD-10-CM

## 2018-10-22 DIAGNOSIS — N18.30 CHRONIC RENAL FAILURE, STAGE 3 (MODERATE) (HCC): ICD-10-CM

## 2018-10-22 PROCEDURE — G8482 FLU IMMUNIZE ORDER/ADMIN: HCPCS | Performed by: NURSE PRACTITIONER

## 2018-10-22 PROCEDURE — 1036F TOBACCO NON-USER: CPT | Performed by: NURSE PRACTITIONER

## 2018-10-22 PROCEDURE — G8417 CALC BMI ABV UP PARAM F/U: HCPCS | Performed by: NURSE PRACTITIONER

## 2018-10-22 PROCEDURE — 51798 US URINE CAPACITY MEASURE: CPT | Performed by: NURSE PRACTITIONER

## 2018-10-22 PROCEDURE — 4040F PNEUMOC VAC/ADMIN/RCVD: CPT | Performed by: NURSE PRACTITIONER

## 2018-10-22 PROCEDURE — G8598 ASA/ANTIPLAT THER USED: HCPCS | Performed by: NURSE PRACTITIONER

## 2018-10-22 PROCEDURE — 1123F ACP DISCUSS/DSCN MKR DOCD: CPT | Performed by: NURSE PRACTITIONER

## 2018-10-22 PROCEDURE — 99213 OFFICE O/P EST LOW 20 MIN: CPT | Performed by: NURSE PRACTITIONER

## 2018-10-22 PROCEDURE — 1101F PT FALLS ASSESS-DOCD LE1/YR: CPT | Performed by: NURSE PRACTITIONER

## 2018-10-22 PROCEDURE — G8427 DOCREV CUR MEDS BY ELIG CLIN: HCPCS | Performed by: NURSE PRACTITIONER

## 2018-10-22 RX ORDER — TAMSULOSIN HYDROCHLORIDE 0.4 MG/1
0.4 CAPSULE ORAL EVERY EVENING
Qty: 90 CAPSULE | Refills: 3 | Status: SHIPPED | OUTPATIENT
Start: 2018-10-22 | End: 2019-10-28 | Stop reason: SDUPTHER

## 2018-10-22 RX ORDER — FINASTERIDE 5 MG/1
5 TABLET, FILM COATED ORAL DAILY
Qty: 90 TABLET | Refills: 3 | Status: SHIPPED | OUTPATIENT
Start: 2018-10-22 | End: 2019-10-28 | Stop reason: SDUPTHER

## 2018-10-22 ASSESSMENT — ENCOUNTER SYMPTOMS
NAUSEA: 0
SHORTNESS OF BREATH: 0
VOMITING: 0
EYE PAIN: 0
BACK PAIN: 0
COLOR CHANGE: 0
EYE REDNESS: 0
WHEEZING: 0
COUGH: 0
ABDOMINAL PAIN: 0
CONSTIPATION: 0

## 2018-10-22 NOTE — PROGRESS NOTES
Subjective:      Patient ID: Camden Yao is a 80 y.o. male. HPI  Patient is a 80 y.o. male in no acute distress and alert and oriented to person, place and time. History  Difficulty voiding after open heart surgery Fall 2015.  ml after not voiding for 2.5 hours. We started Flomax 10/5/15. Pt had symptom improvement and PVR improvement (306 ml), Crt remained fairly stable/elevated.  Added Proscar 10/19/15. Encouraged double voiding for incomplete bladder emptying. Today  Here today to follow-up for incomplete bladder emptying. He is taking Flomax and Proscar daily. PVR 71 ML. Renal function is stable: BUN 22, creatinine 1.96, GFR 33. He has nocturia ×2, and is urinating every 3 hours during the day. He denies incontinence. He denies dysuria or gross hematuria.   Past Medical History:   Diagnosis Date    Acute renal insufficiency 10/5/2015    Anxiety 10/14/2015    ASHD (arteriosclerotic heart disease) 10/14/2015    Bipolar disorder ([facility] [address] 1976    Essential hypertension 9/23/2016    Hyperlipidemia     Hypothyroidism     Localized osteoarthrosis, lower leg 10/14/2015    Parkinson's disease ([facility] [address]     light, 2010     Past Surgical History:   Procedure Laterality Date    CORONARY ARTERY BYPASS GRAFT  08/21/2015     X3 W/ LIMA TO LAD    DIAGNOSTIC CARDIAC CATH LAB PROCEDURE  08/17/15    OTHER SURGICAL HISTORY  08/21/2015    endovascular vein harvesting    OTHER SURGICAL HISTORY  08/21/2015     normothermic cardiopulmonary bypass w/ cardioplegic arrest of the heart    OTHER SURGICAL HISTORY  1994    gallstones removed, Lap     Family History   Problem Relation Age of Onset    Depression Mother     Heart Attack Mother     Breast Cancer Sister      Current Outpatient Prescriptions on File Prior to Visit   Medication Sig Dispense Refill    tamsulosin (FLOMAX) 0.4 MG capsule Take 1 capsule by mouth every evening 30 capsule 2    levothyroxine (SYNTHROID) 88 MCG tablet Take 1 tablet by mouth daily 90 tablet 3    vitamin C (ASCORBIC ACID) 500 MG tablet Take 500 mg by mouth 2 times daily      buPROPion (WELLBUTRIN SR) 100 MG extended release tablet 100 mg daily       metoprolol succinate (TOPROL XL) 25 MG extended release tablet Take 0.5 tablets by mouth daily 90 tablet 3    finasteride (PROSCAR) 5 MG tablet Take 1 tablet by mouth daily 30 tablet 11    nystatin (MYCOSTATIN) 641475 UNIT/GM cream Apply topically 2 times daily. (Patient taking differently: Apply topically prn) 1 Tube 0    Multiple Vitamins-Minerals (MULTIVITAMIN ADULT PO) Take by mouth daily      atorvastatin (LIPITOR) 20 MG tablet daily       aspirin 325 MG tablet Take 325 mg by mouth daily      ALPRAZolam (XANAX) 0.5 MG tablet   as needed       Omega-3 Fatty Acids (FISH OIL) 1000 MG CAPS Take 1,000 mg by mouth daily        No current facility-administered medications on file prior to visit. Outpatient Encounter Prescriptions as of 10/22/2018   Medication Sig Dispense Refill    tamsulosin (FLOMAX) 0.4 MG capsule Take 1 capsule by mouth every evening 30 capsule 2    levothyroxine (SYNTHROID) 88 MCG tablet Take 1 tablet by mouth daily 90 tablet 3    vitamin C (ASCORBIC ACID) 500 MG tablet Take 500 mg by mouth 2 times daily      buPROPion (WELLBUTRIN SR) 100 MG extended release tablet 100 mg daily       metoprolol succinate (TOPROL XL) 25 MG extended release tablet Take 0.5 tablets by mouth daily 90 tablet 3    finasteride (PROSCAR) 5 MG tablet Take 1 tablet by mouth daily 30 tablet 11    nystatin (MYCOSTATIN) 976976 UNIT/GM cream Apply topically 2 times daily.  (Patient taking differently: Apply topically prn) 1 Tube 0    Multiple Vitamins-Minerals (MULTIVITAMIN ADULT PO) Take by mouth daily      atorvastatin (LIPITOR) 20 MG tablet daily       aspirin 325 MG tablet Take 325 mg by mouth daily      ALPRAZolam (XANAX) 0.5 MG tablet   as needed       Omega-3 Fatty Acids (FISH OIL) 1000 MG CAPS Take 1,000 mg by

## 2018-11-21 ENCOUNTER — HOSPITAL ENCOUNTER (OUTPATIENT)
Age: 83
Discharge: HOME | End: 2018-11-21
Payer: MEDICARE

## 2018-11-21 DIAGNOSIS — E78.5 HYPERLIPIDEMIA, UNSPECIFIED HYPERLIPIDEMIA TYPE: ICD-10-CM

## 2018-11-21 LAB
ALT SERPL-CCNC: 14 U/L (ref 5–41)
ANION GAP SERPL CALCULATED.3IONS-SCNC: 9 MMOL/L (ref 9–17)
AST SERPL-CCNC: 15 U/L
BUN BLDV-MCNC: 25 MG/DL (ref 8–23)
BUN/CREAT BLD: 13 (ref 9–20)
CALCIUM SERPL-MCNC: 9 MG/DL (ref 8.6–10.4)
CHLORIDE BLD-SCNC: 107 MMOL/L (ref 98–107)
CO2: 26 MMOL/L (ref 20–31)
CREAT SERPL-MCNC: 2 MG/DL (ref 0.7–1.2)
GFR AFRICAN AMERICAN: 39 ML/MIN
GFR NON-AFRICAN AMERICAN: 32 ML/MIN
GFR SERPL CREATININE-BSD FRML MDRD: ABNORMAL ML/MIN/{1.73_M2}
GFR SERPL CREATININE-BSD FRML MDRD: ABNORMAL ML/MIN/{1.73_M2}
GLUCOSE BLD-MCNC: 104 MG/DL (ref 70–99)
HCT VFR BLD CALC: 47.5 % (ref 40.7–50.3)
HEMOGLOBIN: 15.1 G/DL (ref 13–17)
HIGH SENSITIVE C-REACTIVE PROTEIN: 1.3 MG/L
MCH RBC QN AUTO: 33 PG (ref 25.2–33.5)
MCHC RBC AUTO-ENTMCNC: 31.8 G/DL (ref 28.4–34.8)
MCV RBC AUTO: 103.9 FL (ref 82.6–102.9)
NRBC AUTOMATED: 0 PER 100 WBC
PDW BLD-RTO: 12.8 % (ref 11.8–14.4)
PLATELET # BLD: 232 K/UL (ref 138–453)
PMV BLD AUTO: 10.4 FL (ref 8.1–13.5)
POTASSIUM SERPL-SCNC: 4.6 MMOL/L (ref 3.7–5.3)
RBC # BLD: 4.57 M/UL (ref 4.21–5.77)
SODIUM BLD-SCNC: 142 MMOL/L (ref 135–144)
T4 TOTAL: 6.1 UG/DL (ref 4.5–12)
TSH SERPL DL<=0.05 MIU/L-ACNC: 1.02 MIU/L (ref 0.3–5)
WBC # BLD: 9.9 K/UL (ref 3.5–11.3)

## 2018-11-21 PROCEDURE — 80048 BASIC METABOLIC PNL TOTAL CA: CPT

## 2018-11-21 PROCEDURE — 84443 ASSAY THYROID STIM HORMONE: CPT

## 2018-11-21 PROCEDURE — 85027 COMPLETE CBC AUTOMATED: CPT

## 2018-11-21 PROCEDURE — 84460 ALANINE AMINO (ALT) (SGPT): CPT

## 2018-11-21 PROCEDURE — 84450 TRANSFERASE (AST) (SGOT): CPT

## 2018-11-21 PROCEDURE — 84436 ASSAY OF TOTAL THYROXINE: CPT

## 2018-11-21 PROCEDURE — 36415 COLL VENOUS BLD VENIPUNCTURE: CPT

## 2018-11-21 PROCEDURE — 86141 C-REACTIVE PROTEIN HS: CPT

## 2018-11-28 ENCOUNTER — OFFICE VISIT (OUTPATIENT)
Dept: FAMILY MEDICINE CLINIC | Age: 83
End: 2018-11-28
Payer: MEDICARE

## 2018-11-28 VITALS
BODY MASS INDEX: 32.47 KG/M2 | WEIGHT: 202 LBS | HEIGHT: 66 IN | DIASTOLIC BLOOD PRESSURE: 64 MMHG | SYSTOLIC BLOOD PRESSURE: 100 MMHG

## 2018-11-28 DIAGNOSIS — E03.8 OTHER SPECIFIED HYPOTHYROIDISM: ICD-10-CM

## 2018-11-28 DIAGNOSIS — N18.30 CHRONIC RENAL FAILURE, STAGE 3 (MODERATE) (HCC): ICD-10-CM

## 2018-11-28 DIAGNOSIS — R73.9 HYPERGLYCEMIA: ICD-10-CM

## 2018-11-28 DIAGNOSIS — F31.9 BIPOLAR 1 DISORDER (HCC): ICD-10-CM

## 2018-11-28 DIAGNOSIS — E78.5 HYPERLIPIDEMIA, UNSPECIFIED HYPERLIPIDEMIA TYPE: ICD-10-CM

## 2018-11-28 DIAGNOSIS — I25.10 ASHD (ARTERIOSCLEROTIC HEART DISEASE): Primary | ICD-10-CM

## 2018-11-28 PROCEDURE — 4040F PNEUMOC VAC/ADMIN/RCVD: CPT | Performed by: FAMILY MEDICINE

## 2018-11-28 PROCEDURE — G8427 DOCREV CUR MEDS BY ELIG CLIN: HCPCS | Performed by: FAMILY MEDICINE

## 2018-11-28 PROCEDURE — 1101F PT FALLS ASSESS-DOCD LE1/YR: CPT | Performed by: FAMILY MEDICINE

## 2018-11-28 PROCEDURE — G8417 CALC BMI ABV UP PARAM F/U: HCPCS | Performed by: FAMILY MEDICINE

## 2018-11-28 PROCEDURE — 1123F ACP DISCUSS/DSCN MKR DOCD: CPT | Performed by: FAMILY MEDICINE

## 2018-11-28 PROCEDURE — G8598 ASA/ANTIPLAT THER USED: HCPCS | Performed by: FAMILY MEDICINE

## 2018-11-28 PROCEDURE — 1036F TOBACCO NON-USER: CPT | Performed by: FAMILY MEDICINE

## 2018-11-28 PROCEDURE — 99214 OFFICE O/P EST MOD 30 MIN: CPT | Performed by: FAMILY MEDICINE

## 2018-11-28 PROCEDURE — G8482 FLU IMMUNIZE ORDER/ADMIN: HCPCS | Performed by: FAMILY MEDICINE

## 2018-11-28 NOTE — PATIENT INSTRUCTIONS
PLAN:  His BP is low today but he is asymptomatic, I would like this monitored at home the next couple days. I reviewed his labs with him and we discussed the importance of staying well hydrated  We also discussed the importance of keeping his balance and ways to exercise and work on this at home safely  Avoiding falls is extremely important and we discussed ways to do this  SURVEY:    You may be receiving a survey from [employer] regarding your visit today. Please complete the survey to enable us to provide the highest quality of care to you and your family. If you cannot score us a very good on any question, please call the office to discuss how we could have made your experience a very good one. Thank you.

## 2018-11-28 NOTE — PROGRESS NOTES
I, Fidencio Hernández [facility], am scribing for and in the presence of Dr. Yousif Canales. 11/28/18/4:35 pm/JML    [address] [address]  Jackelin Ortega [address]  Dept: [address] [place] [address] Diandra Barrientos is a 80 y.o. male here for Follow-up (4 month reg check) and Hyperlipidemia      HPI:  ASHD:  Patient presents for follow-up of atherosclerotic coronary artery disease. The patient denies chest pain, SOB, lightheadedness, palpitations and leg edema, he admits to no current symptoms. His most strenuous activity is walking and this never precipitates chest pain.      HYPERLIPIDEMIA   Medication compliance: compliant all of the time. Patient is following a low fat, low cholesterol diet. He is exercising regularly (walking). Pt is accompanied by his son Reginald Denise    Prior to Admission medications    Medication Sig Start Date End Date Taking? Authorizing Provider   tamsulosin (FLOMAX) 0.4 MG capsule Take 1 capsule by mouth every evening 10/22/18  Yes YOLIS Nieto CNP   finasteride (PROSCAR) 5 MG tablet Take 1 tablet by mouth daily 10/22/18  Yes YOLIS Nieto CNP   levothyroxine (SYNTHROID) 88 MCG tablet Take 1 tablet by mouth daily 8/21/18  Yes Yousif Canales MD   vitamin C (ASCORBIC ACID) 500 MG tablet Take 500 mg by mouth 2 times daily   Yes Historical Provider, MD   [facility] SR) 100 MG extended release tablet 100 mg daily  3/26/18  Yes Historical Provider, MD   metoprolol succinate (TOPROL XL) 25 MG extended release tablet Take 0.5 tablets by mouth daily 4/11/18  Yes Yousif Canales MD   nystatin (MYCOSTATIN) 750775 UNIT/GM cream Apply topically 2 times daily.   Patient taking differently: Apply topically prn 7/5/17  Yes Yousif Canales MD   Multiple Vitamins-Minerals (MULTIVITAMIN ADULT PO) Take by mouth daily   Yes Historical Provider, MD   atorvastatin (LIPITOR) 20 MG tablet daily  9/28/15  Yes Historical Provider, MD   aspirin 325 MG tablet Take 325 mg

## 2019-03-19 ENCOUNTER — HOSPITAL ENCOUNTER (OUTPATIENT)
Age: 84
Discharge: HOME | End: 2019-03-19
Payer: MEDICARE

## 2019-03-19 DIAGNOSIS — R73.9 HYPERGLYCEMIA: ICD-10-CM

## 2019-03-19 DIAGNOSIS — E78.5 HYPERLIPIDEMIA, UNSPECIFIED HYPERLIPIDEMIA TYPE: ICD-10-CM

## 2019-03-19 LAB
ALT SERPL-CCNC: 14 U/L (ref 5–41)
ANION GAP SERPL CALCULATED.3IONS-SCNC: 10 MMOL/L (ref 9–17)
AST SERPL-CCNC: 19 U/L
BUN BLDV-MCNC: 27 MG/DL (ref 8–23)
BUN/CREAT BLD: 13 (ref 9–20)
CALCIUM SERPL-MCNC: 9.4 MG/DL (ref 8.6–10.4)
CHLORIDE BLD-SCNC: 106 MMOL/L (ref 98–107)
CHOLESTEROL/HDL RATIO: 3.1
CHOLESTEROL: 93 MG/DL
CO2: 27 MMOL/L (ref 20–31)
CREAT SERPL-MCNC: 2.12 MG/DL (ref 0.7–1.2)
ESTIMATED AVERAGE GLUCOSE: 114 MG/DL
GFR AFRICAN AMERICAN: 36 ML/MIN
GFR NON-AFRICAN AMERICAN: 30 ML/MIN
GFR SERPL CREATININE-BSD FRML MDRD: ABNORMAL ML/MIN/{1.73_M2}
GFR SERPL CREATININE-BSD FRML MDRD: ABNORMAL ML/MIN/{1.73_M2}
GLUCOSE BLD-MCNC: 108 MG/DL (ref 70–99)
HBA1C MFR BLD: 5.6 % (ref 4.8–5.9)
HCT VFR BLD CALC: 47.8 % (ref 40.7–50.3)
HDLC SERPL-MCNC: 30 MG/DL
HEMOGLOBIN: 15.4 G/DL (ref 13–17)
LDL CHOLESTEROL: 38 MG/DL (ref 0–130)
MCH RBC QN AUTO: 32.9 PG (ref 25.2–33.5)
MCHC RBC AUTO-ENTMCNC: 32.2 G/DL (ref 28.4–34.8)
MCV RBC AUTO: 102.1 FL (ref 82.6–102.9)
NRBC AUTOMATED: 0 PER 100 WBC
PDW BLD-RTO: 13 % (ref 11.8–14.4)
PLATELET # BLD: 235 K/UL (ref 138–453)
PMV BLD AUTO: 10.6 FL (ref 8.1–13.5)
POTASSIUM SERPL-SCNC: 4.7 MMOL/L (ref 3.7–5.3)
RBC # BLD: 4.68 M/UL (ref 4.21–5.77)
SODIUM BLD-SCNC: 143 MMOL/L (ref 135–144)
TRIGL SERPL-MCNC: 126 MG/DL
VLDLC SERPL CALC-MCNC: ABNORMAL MG/DL (ref 1–30)
WBC # BLD: 9.1 K/UL (ref 3.5–11.3)

## 2019-03-19 PROCEDURE — 84450 TRANSFERASE (AST) (SGOT): CPT

## 2019-03-19 PROCEDURE — 85027 COMPLETE CBC AUTOMATED: CPT

## 2019-03-19 PROCEDURE — 83036 HEMOGLOBIN GLYCOSYLATED A1C: CPT

## 2019-03-19 PROCEDURE — 84460 ALANINE AMINO (ALT) (SGPT): CPT

## 2019-03-19 PROCEDURE — 36415 COLL VENOUS BLD VENIPUNCTURE: CPT

## 2019-03-19 PROCEDURE — 80048 BASIC METABOLIC PNL TOTAL CA: CPT

## 2019-03-19 PROCEDURE — 80061 LIPID PANEL: CPT

## 2019-03-25 ENCOUNTER — OFFICE VISIT (OUTPATIENT)
Dept: FAMILY MEDICINE CLINIC | Age: 84
End: 2019-03-25
Payer: MEDICARE

## 2019-03-25 VITALS
WEIGHT: 198 LBS | HEIGHT: 66 IN | BODY MASS INDEX: 31.82 KG/M2 | DIASTOLIC BLOOD PRESSURE: 48 MMHG | SYSTOLIC BLOOD PRESSURE: 110 MMHG

## 2019-03-25 DIAGNOSIS — N18.30 CHRONIC KIDNEY DISEASE, STAGE III (MODERATE) (HCC): ICD-10-CM

## 2019-03-25 DIAGNOSIS — E03.9 HYPOTHYROIDISM, UNSPECIFIED TYPE: ICD-10-CM

## 2019-03-25 DIAGNOSIS — N18.30 CHRONIC RENAL FAILURE, STAGE 3 (MODERATE) (HCC): ICD-10-CM

## 2019-03-25 DIAGNOSIS — I95.9 HYPOTENSION, UNSPECIFIED HYPOTENSION TYPE: ICD-10-CM

## 2019-03-25 DIAGNOSIS — E78.2 MIXED HYPERLIPIDEMIA: Primary | ICD-10-CM

## 2019-03-25 DIAGNOSIS — I25.10 ASHD (ARTERIOSCLEROTIC HEART DISEASE): ICD-10-CM

## 2019-03-25 PROCEDURE — 4040F PNEUMOC VAC/ADMIN/RCVD: CPT | Performed by: FAMILY MEDICINE

## 2019-03-25 PROCEDURE — G8417 CALC BMI ABV UP PARAM F/U: HCPCS | Performed by: FAMILY MEDICINE

## 2019-03-25 PROCEDURE — 99214 OFFICE O/P EST MOD 30 MIN: CPT | Performed by: FAMILY MEDICINE

## 2019-03-25 PROCEDURE — G8598 ASA/ANTIPLAT THER USED: HCPCS | Performed by: FAMILY MEDICINE

## 2019-03-25 PROCEDURE — 1036F TOBACCO NON-USER: CPT | Performed by: FAMILY MEDICINE

## 2019-03-25 PROCEDURE — G8427 DOCREV CUR MEDS BY ELIG CLIN: HCPCS | Performed by: FAMILY MEDICINE

## 2019-03-25 PROCEDURE — G8482 FLU IMMUNIZE ORDER/ADMIN: HCPCS | Performed by: FAMILY MEDICINE

## 2019-03-25 PROCEDURE — 1123F ACP DISCUSS/DSCN MKR DOCD: CPT | Performed by: FAMILY MEDICINE

## 2019-04-24 ENCOUNTER — OFFICE VISIT (OUTPATIENT)
Dept: UROLOGY | Age: 84
End: 2019-04-24
Payer: MEDICARE

## 2019-04-24 VITALS
HEIGHT: 66 IN | HEART RATE: 57 BPM | DIASTOLIC BLOOD PRESSURE: 73 MMHG | WEIGHT: 198 LBS | BODY MASS INDEX: 31.82 KG/M2 | SYSTOLIC BLOOD PRESSURE: 129 MMHG

## 2019-04-24 DIAGNOSIS — R33.8 BENIGN PROSTATIC HYPERPLASIA WITH URINARY RETENTION: Primary | ICD-10-CM

## 2019-04-24 DIAGNOSIS — N40.1 BENIGN PROSTATIC HYPERPLASIA WITH URINARY RETENTION: Primary | ICD-10-CM

## 2019-04-24 DIAGNOSIS — N18.30 CHRONIC RENAL FAILURE, STAGE 3 (MODERATE) (HCC): ICD-10-CM

## 2019-04-24 PROCEDURE — 1123F ACP DISCUSS/DSCN MKR DOCD: CPT | Performed by: UROLOGY

## 2019-04-24 PROCEDURE — 1036F TOBACCO NON-USER: CPT | Performed by: UROLOGY

## 2019-04-24 PROCEDURE — 99213 OFFICE O/P EST LOW 20 MIN: CPT | Performed by: UROLOGY

## 2019-04-24 PROCEDURE — 51798 US URINE CAPACITY MEASURE: CPT | Performed by: UROLOGY

## 2019-04-24 PROCEDURE — G8427 DOCREV CUR MEDS BY ELIG CLIN: HCPCS | Performed by: UROLOGY

## 2019-04-24 PROCEDURE — 4040F PNEUMOC VAC/ADMIN/RCVD: CPT | Performed by: UROLOGY

## 2019-04-24 PROCEDURE — G8598 ASA/ANTIPLAT THER USED: HCPCS | Performed by: UROLOGY

## 2019-04-24 PROCEDURE — G8417 CALC BMI ABV UP PARAM F/U: HCPCS | Performed by: UROLOGY

## 2019-04-24 ASSESSMENT — ENCOUNTER SYMPTOMS
WHEEZING: 0
NAUSEA: 0
COLOR CHANGE: 0
EYE PAIN: 0
EYE REDNESS: 0
COUGH: 0
ABDOMINAL PAIN: 0
VOMITING: 0
SHORTNESS OF BREATH: 0
BACK PAIN: 0

## 2019-04-24 NOTE — PATIENT INSTRUCTIONS
SURVEY:    You may be receiving a survey from [employer] regarding your visit today. Please complete the survey to enable us to provide the highest quality of care to you and your family. If you cannot score us a very good on any question, please call the office to discuss how we could have made your experience a very good one. Thank you.

## 2019-04-24 NOTE — PROGRESS NOTES
HPI:    Patient is a 80 y.o. male in no acute distress. He is alert and oriented to person, place, and time. History  Difficulty voiding after open heart surgery Fall 2015.  ml after not voiding for 2.5 hours. We started Flomax 10/5/15. Pt had symptom improvement and PVR improvement (306 ml), Crt remained fairly stable/elevated.  Added Proscar 10/19/15. Encouraged double voiding for incomplete bladder emptying. Currently  Patient is here today for 6 month follow-up. Patient is doing well. He does continue Flomax and Proscar. Patient did have repeat BMP done today. Patient's current creatinine is up to 2.12. His GFR is 30. This is only slightly worse than last visit. Or his creatinine was 2.00. His GFR was 32 at that point in time. Patient does have repeat lab work ordered by his primary care physician in approximately 3 months. Patient was able to void 100 mL today. His postvoid residual is 360 mL. Patient has had no recent gross hematuria dysuria. He reports no pain today.     Past Medical History:   Diagnosis Date    Acute renal insufficiency 10/5/2015    Anxiety 10/14/2015    ASHD (arteriosclerotic heart disease) 10/14/2015    Bipolar disorder ([facility] [address] 1976    Essential hypertension 9/23/2016    Hyperlipidemia     Hypothyroidism     Localized osteoarthrosis, lower leg 10/14/2015    Parkinson's disease ([facility] [address]     light, 2010     Past Surgical History:   Procedure Laterality Date    CORONARY ARTERY BYPASS GRAFT  08/21/2015     X3 W/ LIMA TO LAD    DIAGNOSTIC CARDIAC CATH LAB PROCEDURE  08/17/15    OTHER SURGICAL HISTORY  08/21/2015    endovascular vein harvesting    OTHER SURGICAL HISTORY  08/21/2015     normothermic cardiopulmonary bypass w/ cardioplegic arrest of the heart    OTHER SURGICAL HISTORY  1994    gallstones removed, Lap     Outpatient Encounter Medications as of 4/24/2019   Medication Sig Dispense Refill    tamsulosin (FLOMAX) 0.4 MG capsule Take 1 capsule by mouth every evening 90 capsule 3    finasteride (PROSCAR) 5 MG tablet Take 1 tablet by mouth daily 90 tablet 3    levothyroxine (SYNTHROID) 88 MCG tablet Take 1 tablet by mouth daily 90 tablet 3    vitamin C (ASCORBIC ACID) 500 MG tablet Take 1,000 mg by mouth 2 times daily       buPROPion (WELLBUTRIN SR) 100 MG extended release tablet 100 mg daily       metoprolol succinate (TOPROL XL) 25 MG extended release tablet Take 0.5 tablets by mouth daily 90 tablet 3    nystatin (MYCOSTATIN) 662773 UNIT/GM cream Apply topically 2 times daily. (Patient taking differently: Apply topically prn) 1 Tube 0    Multiple Vitamins-Minerals (MULTIVITAMIN ADULT PO) Take by mouth daily      atorvastatin (LIPITOR) 20 MG tablet daily       aspirin 325 MG tablet Take 325 mg by mouth daily      Omega-3 Fatty Acids (FISH OIL) 1000 MG CAPS Take 1,000 mg by mouth daily        No facility-administered encounter medications on file as of 4/24/2019. Current Outpatient Medications on File Prior to Visit   Medication Sig Dispense Refill    tamsulosin (FLOMAX) 0.4 MG capsule Take 1 capsule by mouth every evening 90 capsule 3    finasteride (PROSCAR) 5 MG tablet Take 1 tablet by mouth daily 90 tablet 3    levothyroxine (SYNTHROID) 88 MCG tablet Take 1 tablet by mouth daily 90 tablet 3    vitamin C (ASCORBIC ACID) 500 MG tablet Take 1,000 mg by mouth 2 times daily       buPROPion (WELLBUTRIN SR) 100 MG extended release tablet 100 mg daily       metoprolol succinate (TOPROL XL) 25 MG extended release tablet Take 0.5 tablets by mouth daily 90 tablet 3    nystatin (MYCOSTATIN) 789354 UNIT/GM cream Apply topically 2 times daily.  (Patient taking differently: Apply topically prn) 1 Tube 0    Multiple Vitamins-Minerals (MULTIVITAMIN ADULT PO) Take by mouth daily      atorvastatin (LIPITOR) 20 MG tablet daily       aspirin 325 MG tablet Take 325 mg by mouth daily      Omega-3 Fatty Acids (FISH OIL) 1000 MG CAPS Take 1,000 mg by

## 2019-05-07 ENCOUNTER — HOSPITAL ENCOUNTER (OUTPATIENT)
Dept: NON INVASIVE DIAGNOSTICS | Age: 84
Discharge: HOME | End: 2019-05-07
Payer: MEDICARE

## 2019-05-07 DIAGNOSIS — I95.9 HYPOTENSION, UNSPECIFIED HYPOTENSION TYPE: ICD-10-CM

## 2019-05-07 LAB
LV EF: 70 %
LVEF MODALITY: NORMAL

## 2019-05-07 PROCEDURE — 93306 TTE W/DOPPLER COMPLETE: CPT

## 2019-06-17 RX ORDER — METOPROLOL SUCCINATE 25 MG/1
TABLET, EXTENDED RELEASE ORAL
Qty: 45 TABLET | Refills: 0 | Status: SHIPPED | OUTPATIENT
Start: 2019-06-17 | End: 2019-11-26 | Stop reason: ALTCHOICE

## 2019-07-24 ENCOUNTER — HOSPITAL ENCOUNTER (OUTPATIENT)
Age: 84
Discharge: HOME | End: 2019-07-24
Payer: MEDICARE

## 2019-07-24 DIAGNOSIS — E78.2 MIXED HYPERLIPIDEMIA: ICD-10-CM

## 2019-07-24 LAB
ALT SERPL-CCNC: 12 U/L (ref 5–41)
ANION GAP SERPL CALCULATED.3IONS-SCNC: 11 MMOL/L (ref 9–17)
AST SERPL-CCNC: 14 U/L
BUN BLDV-MCNC: 23 MG/DL (ref 8–23)
BUN/CREAT BLD: 11 (ref 9–20)
CALCIUM SERPL-MCNC: 9.2 MG/DL (ref 8.6–10.4)
CHLORIDE BLD-SCNC: 108 MMOL/L (ref 98–107)
CO2: 24 MMOL/L (ref 20–31)
CREAT SERPL-MCNC: 2.04 MG/DL (ref 0.7–1.2)
GFR AFRICAN AMERICAN: 38 ML/MIN
GFR NON-AFRICAN AMERICAN: 31 ML/MIN
GFR SERPL CREATININE-BSD FRML MDRD: ABNORMAL ML/MIN/{1.73_M2}
GFR SERPL CREATININE-BSD FRML MDRD: ABNORMAL ML/MIN/{1.73_M2}
GLUCOSE BLD-MCNC: 104 MG/DL (ref 70–99)
HCT VFR BLD CALC: 45.9 % (ref 40.7–50.3)
HEMOGLOBIN: 15.3 G/DL (ref 13–17)
HIGH SENSITIVE C-REACTIVE PROTEIN: 2.7 MG/L
MCH RBC QN AUTO: 33.6 PG (ref 25.2–33.5)
MCHC RBC AUTO-ENTMCNC: 33.3 G/DL (ref 28.4–34.8)
MCV RBC AUTO: 100.9 FL (ref 82.6–102.9)
NRBC AUTOMATED: 0 PER 100 WBC
PDW BLD-RTO: 13 % (ref 11.8–14.4)
PLATELET # BLD: 239 K/UL (ref 138–453)
PMV BLD AUTO: 10.8 FL (ref 8.1–13.5)
POTASSIUM SERPL-SCNC: 4.6 MMOL/L (ref 3.7–5.3)
RBC # BLD: 4.55 M/UL (ref 4.21–5.77)
SODIUM BLD-SCNC: 143 MMOL/L (ref 135–144)
T4 TOTAL: 6.5 UG/DL (ref 4.5–12)
TSH SERPL DL<=0.05 MIU/L-ACNC: 0.75 MIU/L (ref 0.3–5)
WBC # BLD: 8.3 K/UL (ref 3.5–11.3)

## 2019-07-24 PROCEDURE — 85027 COMPLETE CBC AUTOMATED: CPT

## 2019-07-24 PROCEDURE — 84443 ASSAY THYROID STIM HORMONE: CPT

## 2019-07-24 PROCEDURE — 84450 TRANSFERASE (AST) (SGOT): CPT

## 2019-07-24 PROCEDURE — 86141 C-REACTIVE PROTEIN HS: CPT

## 2019-07-24 PROCEDURE — 84436 ASSAY OF TOTAL THYROXINE: CPT

## 2019-07-24 PROCEDURE — 36415 COLL VENOUS BLD VENIPUNCTURE: CPT

## 2019-07-24 PROCEDURE — 84460 ALANINE AMINO (ALT) (SGPT): CPT

## 2019-07-24 PROCEDURE — 80048 BASIC METABOLIC PNL TOTAL CA: CPT

## 2019-07-30 ENCOUNTER — OFFICE VISIT (OUTPATIENT)
Dept: FAMILY MEDICINE CLINIC | Age: 84
End: 2019-07-30
Payer: MEDICARE

## 2019-07-30 VITALS
BODY MASS INDEX: 31.18 KG/M2 | HEIGHT: 66 IN | SYSTOLIC BLOOD PRESSURE: 104 MMHG | DIASTOLIC BLOOD PRESSURE: 56 MMHG | WEIGHT: 194 LBS

## 2019-07-30 DIAGNOSIS — N18.30 CHRONIC RENAL FAILURE, STAGE 3 (MODERATE) (HCC): ICD-10-CM

## 2019-07-30 DIAGNOSIS — I25.10 ASHD (ARTERIOSCLEROTIC HEART DISEASE): ICD-10-CM

## 2019-07-30 DIAGNOSIS — R73.9 HYPERGLYCEMIA: ICD-10-CM

## 2019-07-30 DIAGNOSIS — N40.1 BENIGN PROSTATIC HYPERPLASIA WITH URINARY RETENTION: Primary | ICD-10-CM

## 2019-07-30 DIAGNOSIS — R33.8 BENIGN PROSTATIC HYPERPLASIA WITH URINARY RETENTION: Primary | ICD-10-CM

## 2019-07-30 DIAGNOSIS — I10 ESSENTIAL HYPERTENSION: ICD-10-CM

## 2019-07-30 PROCEDURE — 1123F ACP DISCUSS/DSCN MKR DOCD: CPT | Performed by: FAMILY MEDICINE

## 2019-07-30 PROCEDURE — 4040F PNEUMOC VAC/ADMIN/RCVD: CPT | Performed by: FAMILY MEDICINE

## 2019-07-30 PROCEDURE — G0439 PPPS, SUBSEQ VISIT: HCPCS | Performed by: FAMILY MEDICINE

## 2019-07-30 PROCEDURE — G8598 ASA/ANTIPLAT THER USED: HCPCS | Performed by: FAMILY MEDICINE

## 2019-07-30 ASSESSMENT — PATIENT HEALTH QUESTIONNAIRE - PHQ9
SUM OF ALL RESPONSES TO PHQ QUESTIONS 1-9: 0
SUM OF ALL RESPONSES TO PHQ QUESTIONS 1-9: 0

## 2019-07-30 ASSESSMENT — LIFESTYLE VARIABLES: HOW OFTEN DO YOU HAVE A DRINK CONTAINING ALCOHOL: 0

## 2019-07-30 NOTE — PROGRESS NOTES
uses OTC medsif it lasts more than a couple days. Denies nausea. Denies vomiting. Genitourinary: Denies blood in the urine. Denies difficulty urinating. Admits frequent urination, attributes this to his enlarged prostate Denies painful urination. Denies urinary incontinence  Musculoskeletal: Denies muscle aches. Denies painful joints. Denies swollen joints. Peripheral Vascular: Denies pain/cramping in legs after exertion. Skin: Denies dry skin. Denies itching. Denies rash. Neurologic: Denies falls. Denies dizziness. Denies fainting. Denies tingling/numbness. Psychiatric: Denies sleep disturbance. Denies anxiety. Denies depressed mood. CareTeam (Including outside providers/suppliers regularly involved in providing care):   Patient Care Team:  Galina Novak MD as PCP - General (Family Medicine)  Galina Novak MD as PCP - [facility] Empaneled Provider    Wt Readings from Last 3 Encounters:   04/24/19 198 lb (89.8 kg)   03/25/19 198 lb (89.8 kg)   11/28/18 202 lb (91.6 kg)     Vitals:    07/30/19 1537   Height: 5' 6\" (1.676 m)     Body mass index is 31.96 kg/m². Based upon direct observation of the patient, evaluation of cognition reveals recent and remote memory intact.     Exam:  General Appearance: alert and oriented to person, place and time, well developed and well- nourished, in no acute distress, wearing glasses  Skin: warm and dry, no rash or erythema  Head: normocephalic and atraumatic  ENT: tympanic membrane, external ear and ear canal normal bilaterally, nose without deformity, nasal mucosa and turbinates normal without polyps  Neck: supple and non-tender without mass, no thyromegaly or thyroid nodules, no cervical lymphadenopathy  Pulmonary/Chest: clear to auscultation bilaterally- no wheezes, rales or rhonchi, normal air movement, no respiratory distress  Cardiovascular: normal rate, regular rhythm, normal S1 and S2, no murmurs, rubs, clicks, or gallops, distal pulses intact, no carotid

## 2019-10-28 ENCOUNTER — OFFICE VISIT (OUTPATIENT)
Dept: UROLOGY | Age: 84
End: 2019-10-28
Payer: MEDICARE

## 2019-10-28 ENCOUNTER — HOSPITAL ENCOUNTER (OUTPATIENT)
Age: 84
Setting detail: SPECIMEN
Discharge: HOME | End: 2019-10-28
Payer: MEDICARE

## 2019-10-28 VITALS
DIASTOLIC BLOOD PRESSURE: 60 MMHG | BODY MASS INDEX: 30.76 KG/M2 | WEIGHT: 196 LBS | HEIGHT: 67 IN | SYSTOLIC BLOOD PRESSURE: 102 MMHG

## 2019-10-28 DIAGNOSIS — R33.8 BENIGN PROSTATIC HYPERPLASIA WITH URINARY RETENTION: ICD-10-CM

## 2019-10-28 DIAGNOSIS — N40.1 BENIGN PROSTATIC HYPERPLASIA WITH URINARY RETENTION: Primary | ICD-10-CM

## 2019-10-28 DIAGNOSIS — R33.8 BENIGN PROSTATIC HYPERPLASIA WITH URINARY RETENTION: Primary | ICD-10-CM

## 2019-10-28 DIAGNOSIS — N18.30 CHRONIC RENAL FAILURE, STAGE 3 (MODERATE) (HCC): ICD-10-CM

## 2019-10-28 DIAGNOSIS — N40.1 BENIGN PROSTATIC HYPERPLASIA WITH URINARY RETENTION: ICD-10-CM

## 2019-10-28 LAB
-: ABNORMAL
AMORPHOUS: ABNORMAL
BACTERIA: ABNORMAL
BILIRUBIN URINE: NEGATIVE
CASTS UA: ABNORMAL /LPF
COLOR: YELLOW
COMMENT UA: ABNORMAL
CRYSTALS, UA: ABNORMAL /HPF
EPITHELIAL CELLS UA: ABNORMAL /HPF (ref 0–5)
GLUCOSE URINE: NEGATIVE
KETONES, URINE: NEGATIVE
LEUKOCYTE ESTERASE, URINE: NEGATIVE
MUCUS: ABNORMAL
NITRITE, URINE: NEGATIVE
OTHER OBSERVATIONS UA: ABNORMAL
PH UA: 6 (ref 5–9)
PROTEIN UA: NEGATIVE
RBC UA: ABNORMAL /HPF (ref 0–2)
RENAL EPITHELIAL, UA: ABNORMAL /HPF
SPECIFIC GRAVITY UA: <1.005 (ref 1.01–1.02)
TRICHOMONAS: ABNORMAL
TURBIDITY: CLEAR
URINE HGB: NEGATIVE
UROBILINOGEN, URINE: NORMAL
WBC UA: ABNORMAL /HPF (ref 0–5)
YEAST: ABNORMAL

## 2019-10-28 PROCEDURE — G8484 FLU IMMUNIZE NO ADMIN: HCPCS | Performed by: NURSE PRACTITIONER

## 2019-10-28 PROCEDURE — 1036F TOBACCO NON-USER: CPT | Performed by: NURSE PRACTITIONER

## 2019-10-28 PROCEDURE — G8427 DOCREV CUR MEDS BY ELIG CLIN: HCPCS | Performed by: NURSE PRACTITIONER

## 2019-10-28 PROCEDURE — 87086 URINE CULTURE/COLONY COUNT: CPT

## 2019-10-28 PROCEDURE — 51798 US URINE CAPACITY MEASURE: CPT | Performed by: NURSE PRACTITIONER

## 2019-10-28 PROCEDURE — G8598 ASA/ANTIPLAT THER USED: HCPCS | Performed by: NURSE PRACTITIONER

## 2019-10-28 PROCEDURE — 81001 URINALYSIS AUTO W/SCOPE: CPT

## 2019-10-28 PROCEDURE — 99214 OFFICE O/P EST MOD 30 MIN: CPT | Performed by: NURSE PRACTITIONER

## 2019-10-28 PROCEDURE — 4040F PNEUMOC VAC/ADMIN/RCVD: CPT | Performed by: NURSE PRACTITIONER

## 2019-10-28 PROCEDURE — 1123F ACP DISCUSS/DSCN MKR DOCD: CPT | Performed by: NURSE PRACTITIONER

## 2019-10-28 PROCEDURE — G8417 CALC BMI ABV UP PARAM F/U: HCPCS | Performed by: NURSE PRACTITIONER

## 2019-10-28 RX ORDER — TAMSULOSIN HYDROCHLORIDE 0.4 MG/1
0.4 CAPSULE ORAL 2 TIMES DAILY
Qty: 60 CAPSULE | Refills: 1 | Status: SHIPPED | OUTPATIENT
Start: 2019-10-28 | End: 2019-11-26 | Stop reason: SDUPTHER

## 2019-10-28 RX ORDER — FINASTERIDE 5 MG/1
5 TABLET, FILM COATED ORAL DAILY
Qty: 90 TABLET | Refills: 3 | Status: SHIPPED | OUTPATIENT
Start: 2019-10-28 | End: 2020-11-06

## 2019-10-28 ASSESSMENT — ENCOUNTER SYMPTOMS
WHEEZING: 0
SHORTNESS OF BREATH: 0
VOMITING: 0
COLOR CHANGE: 0
BACK PAIN: 0
CONSTIPATION: 0
NAUSEA: 0
EYE REDNESS: 0
EYE PAIN: 0
ABDOMINAL PAIN: 0
COUGH: 0

## 2019-10-30 ENCOUNTER — TELEPHONE (OUTPATIENT)
Dept: UROLOGY | Age: 84
End: 2019-10-30

## 2019-10-30 LAB
CULTURE: NORMAL
Lab: NORMAL
SPECIMEN DESCRIPTION: NORMAL

## 2019-11-04 ENCOUNTER — HOSPITAL ENCOUNTER (OUTPATIENT)
Dept: ULTRASOUND IMAGING | Age: 84
Discharge: HOME | End: 2019-11-06
Payer: MEDICARE

## 2019-11-04 ENCOUNTER — HOSPITAL ENCOUNTER (OUTPATIENT)
Age: 84
Discharge: HOME | End: 2019-11-04
Payer: MEDICARE

## 2019-11-04 DIAGNOSIS — N40.1 BENIGN PROSTATIC HYPERPLASIA WITH URINARY RETENTION: ICD-10-CM

## 2019-11-04 DIAGNOSIS — N18.30 CHRONIC RENAL FAILURE, STAGE 3 (MODERATE) (HCC): ICD-10-CM

## 2019-11-04 DIAGNOSIS — R33.8 BENIGN PROSTATIC HYPERPLASIA WITH URINARY RETENTION: ICD-10-CM

## 2019-11-04 LAB
ANION GAP SERPL CALCULATED.3IONS-SCNC: 10 MMOL/L (ref 9–17)
BUN BLDV-MCNC: 23 MG/DL (ref 8–23)
BUN/CREAT BLD: 12 (ref 9–20)
CALCIUM SERPL-MCNC: 9.3 MG/DL (ref 8.6–10.4)
CHLORIDE BLD-SCNC: 104 MMOL/L (ref 98–107)
CO2: 25 MMOL/L (ref 20–31)
CREAT SERPL-MCNC: 1.89 MG/DL (ref 0.7–1.2)
GFR AFRICAN AMERICAN: 41 ML/MIN
GFR NON-AFRICAN AMERICAN: 34 ML/MIN
GFR SERPL CREATININE-BSD FRML MDRD: ABNORMAL ML/MIN/{1.73_M2}
GFR SERPL CREATININE-BSD FRML MDRD: ABNORMAL ML/MIN/{1.73_M2}
GLUCOSE BLD-MCNC: 102 MG/DL (ref 70–99)
POTASSIUM SERPL-SCNC: 4.5 MMOL/L (ref 3.7–5.3)
SODIUM BLD-SCNC: 139 MMOL/L (ref 135–144)

## 2019-11-04 PROCEDURE — 36415 COLL VENOUS BLD VENIPUNCTURE: CPT

## 2019-11-04 PROCEDURE — 76770 US EXAM ABDO BACK WALL COMP: CPT

## 2019-11-04 PROCEDURE — 80048 BASIC METABOLIC PNL TOTAL CA: CPT

## 2019-11-19 ENCOUNTER — HOSPITAL ENCOUNTER (OUTPATIENT)
Age: 84
Discharge: HOME | End: 2019-11-19
Payer: MEDICARE

## 2019-11-19 ENCOUNTER — TELEPHONE (OUTPATIENT)
Dept: UROLOGY | Age: 84
End: 2019-11-19

## 2019-11-19 DIAGNOSIS — R73.9 HYPERGLYCEMIA: ICD-10-CM

## 2019-11-19 DIAGNOSIS — N18.30 CHRONIC RENAL FAILURE, STAGE 3 (MODERATE) (HCC): ICD-10-CM

## 2019-11-19 LAB
ABSOLUTE EOS #: 0.56 K/UL (ref 0–0.44)
ABSOLUTE IMMATURE GRANULOCYTE: 0.03 K/UL (ref 0–0.3)
ABSOLUTE LYMPH #: 2.51 K/UL (ref 1.1–3.7)
ABSOLUTE MONO #: 0.88 K/UL (ref 0.1–1.2)
ALBUMIN SERPL-MCNC: 3.6 G/DL (ref 3.5–5.2)
ALBUMIN/GLOBULIN RATIO: 1.1 (ref 1–2.5)
ALP BLD-CCNC: 98 U/L (ref 40–129)
ALT SERPL-CCNC: 20 U/L (ref 5–41)
ANION GAP SERPL CALCULATED.3IONS-SCNC: 12 MMOL/L
AST SERPL-CCNC: 18 U/L
BASOPHILS # BLD: 1 % (ref 0–2)
BASOPHILS ABSOLUTE: 0.05 K/UL (ref 0–0.2)
BILIRUB SERPL-MCNC: 0.49 MG/DL (ref 0.3–1.2)
BUN BLDV-MCNC: 26 MG/DL (ref 8–23)
BUN/CREAT BLD: 13 (ref 9–20)
CALCIUM SERPL-MCNC: 9.3 MG/DL (ref 8.6–10.4)
CHLORIDE BLD-SCNC: 106 MMOL/L (ref 98–107)
CO2: 24 MMOL/L (ref 20–31)
CREAT SERPL-MCNC: 1.93 MG/DL (ref 0.7–1.2)
CREATININE URINE: 105.3 MG/DL (ref 39–259)
DIFFERENTIAL TYPE: ABNORMAL
EOSINOPHILS RELATIVE PERCENT: 7 % (ref 1–4)
ESTIMATED AVERAGE GLUCOSE: 114 MG/DL
GFR AFRICAN AMERICAN: 40 ML/MIN
GFR NON-AFRICAN AMERICAN: 33 ML/MIN
GFR SERPL CREATININE-BSD FRML MDRD: ABNORMAL ML/MIN/{1.73_M2}
GFR SERPL CREATININE-BSD FRML MDRD: ABNORMAL ML/MIN/{1.73_M2}
GLUCOSE BLD-MCNC: 92 MG/DL (ref 70–99)
HBA1C MFR BLD: 5.6 % (ref 4.8–5.9)
HCT VFR BLD CALC: 48.9 % (ref 40.7–50.3)
HEMOGLOBIN: 15.7 G/DL (ref 13–17)
IMMATURE GRANULOCYTES: 0 %
LYMPHOCYTES # BLD: 29 % (ref 24–43)
MCH RBC QN AUTO: 33.3 PG (ref 25.2–33.5)
MCHC RBC AUTO-ENTMCNC: 32.1 G/DL (ref 28.4–34.8)
MCV RBC AUTO: 103.8 FL (ref 82.6–102.9)
MICROALBUMIN/CREAT 24H UR: <12 MG/L
MICROALBUMIN/CREAT UR-RTO: NORMAL MCG/MG CREAT
MONOCYTES # BLD: 10 % (ref 3–12)
NRBC AUTOMATED: 0 PER 100 WBC
PDW BLD-RTO: 13 % (ref 11.8–14.4)
PLATELET # BLD: 249 K/UL (ref 138–453)
PLATELET ESTIMATE: ABNORMAL
PMV BLD AUTO: 11 FL (ref 8.1–13.5)
POTASSIUM SERPL-SCNC: 4.8 MMOL/L (ref 3.7–5.3)
RBC # BLD: 4.71 M/UL (ref 4.21–5.77)
RBC # BLD: ABNORMAL 10*6/UL
SEG NEUTROPHILS: 53 % (ref 36–65)
SEGMENTED NEUTROPHILS ABSOLUTE COUNT: 4.5 K/UL (ref 1.5–8.1)
SODIUM BLD-SCNC: 142 MMOL/L (ref 135–144)
TOTAL PROTEIN: 7 G/DL (ref 6.4–8.3)
WBC # BLD: 8.5 K/UL (ref 3.5–11.3)
WBC # BLD: ABNORMAL 10*3/UL

## 2019-11-19 PROCEDURE — 85025 COMPLETE CBC W/AUTO DIFF WBC: CPT

## 2019-11-19 PROCEDURE — 82570 ASSAY OF URINE CREATININE: CPT

## 2019-11-19 PROCEDURE — 36415 COLL VENOUS BLD VENIPUNCTURE: CPT

## 2019-11-19 PROCEDURE — 80053 COMPREHEN METABOLIC PANEL: CPT

## 2019-11-19 PROCEDURE — 83036 HEMOGLOBIN GLYCOSYLATED A1C: CPT

## 2019-11-19 PROCEDURE — 82043 UR ALBUMIN QUANTITATIVE: CPT

## 2019-11-26 ENCOUNTER — OFFICE VISIT (OUTPATIENT)
Dept: UROLOGY | Age: 84
End: 2019-11-26
Payer: MEDICARE

## 2019-11-26 ENCOUNTER — OFFICE VISIT (OUTPATIENT)
Dept: FAMILY MEDICINE CLINIC | Age: 84
End: 2019-11-26
Payer: MEDICARE

## 2019-11-26 VITALS
BODY MASS INDEX: 30.76 KG/M2 | SYSTOLIC BLOOD PRESSURE: 120 MMHG | HEIGHT: 67 IN | WEIGHT: 196 LBS | DIASTOLIC BLOOD PRESSURE: 70 MMHG

## 2019-11-26 VITALS
WEIGHT: 196 LBS | DIASTOLIC BLOOD PRESSURE: 64 MMHG | BODY MASS INDEX: 30.76 KG/M2 | SYSTOLIC BLOOD PRESSURE: 108 MMHG | HEIGHT: 67 IN

## 2019-11-26 DIAGNOSIS — Z23 NEEDS FLU SHOT: Primary | ICD-10-CM

## 2019-11-26 DIAGNOSIS — E78.5 HYPERLIPIDEMIA, UNSPECIFIED HYPERLIPIDEMIA TYPE: ICD-10-CM

## 2019-11-26 DIAGNOSIS — R00.1 BRADYCARDIA: ICD-10-CM

## 2019-11-26 DIAGNOSIS — R33.8 BENIGN PROSTATIC HYPERPLASIA WITH URINARY RETENTION: Primary | ICD-10-CM

## 2019-11-26 DIAGNOSIS — N18.30 CHRONIC RENAL FAILURE, STAGE 3 (MODERATE) (HCC): ICD-10-CM

## 2019-11-26 DIAGNOSIS — I25.10 ASHD (ARTERIOSCLEROTIC HEART DISEASE): ICD-10-CM

## 2019-11-26 DIAGNOSIS — I10 ESSENTIAL HYPERTENSION: ICD-10-CM

## 2019-11-26 DIAGNOSIS — R73.9 HYPERGLYCEMIA: ICD-10-CM

## 2019-11-26 DIAGNOSIS — N40.1 BENIGN PROSTATIC HYPERPLASIA WITH URINARY RETENTION: Primary | ICD-10-CM

## 2019-11-26 PROCEDURE — 1036F TOBACCO NON-USER: CPT | Performed by: FAMILY MEDICINE

## 2019-11-26 PROCEDURE — G8417 CALC BMI ABV UP PARAM F/U: HCPCS | Performed by: NURSE PRACTITIONER

## 2019-11-26 PROCEDURE — G8482 FLU IMMUNIZE ORDER/ADMIN: HCPCS | Performed by: NURSE PRACTITIONER

## 2019-11-26 PROCEDURE — G8598 ASA/ANTIPLAT THER USED: HCPCS | Performed by: FAMILY MEDICINE

## 2019-11-26 PROCEDURE — G8598 ASA/ANTIPLAT THER USED: HCPCS | Performed by: NURSE PRACTITIONER

## 2019-11-26 PROCEDURE — G8427 DOCREV CUR MEDS BY ELIG CLIN: HCPCS | Performed by: FAMILY MEDICINE

## 2019-11-26 PROCEDURE — G8482 FLU IMMUNIZE ORDER/ADMIN: HCPCS | Performed by: FAMILY MEDICINE

## 2019-11-26 PROCEDURE — 51798 US URINE CAPACITY MEASURE: CPT | Performed by: NURSE PRACTITIONER

## 2019-11-26 PROCEDURE — 1123F ACP DISCUSS/DSCN MKR DOCD: CPT | Performed by: FAMILY MEDICINE

## 2019-11-26 PROCEDURE — 1123F ACP DISCUSS/DSCN MKR DOCD: CPT | Performed by: NURSE PRACTITIONER

## 2019-11-26 PROCEDURE — 99214 OFFICE O/P EST MOD 30 MIN: CPT | Performed by: FAMILY MEDICINE

## 2019-11-26 PROCEDURE — G0008 ADMIN INFLUENZA VIRUS VAC: HCPCS | Performed by: FAMILY MEDICINE

## 2019-11-26 PROCEDURE — 4040F PNEUMOC VAC/ADMIN/RCVD: CPT | Performed by: NURSE PRACTITIONER

## 2019-11-26 PROCEDURE — 4040F PNEUMOC VAC/ADMIN/RCVD: CPT | Performed by: FAMILY MEDICINE

## 2019-11-26 PROCEDURE — G8417 CALC BMI ABV UP PARAM F/U: HCPCS | Performed by: FAMILY MEDICINE

## 2019-11-26 PROCEDURE — 93000 ELECTROCARDIOGRAM COMPLETE: CPT | Performed by: FAMILY MEDICINE

## 2019-11-26 PROCEDURE — G8427 DOCREV CUR MEDS BY ELIG CLIN: HCPCS | Performed by: NURSE PRACTITIONER

## 2019-11-26 PROCEDURE — 90653 IIV ADJUVANT VACCINE IM: CPT | Performed by: FAMILY MEDICINE

## 2019-11-26 PROCEDURE — 1036F TOBACCO NON-USER: CPT | Performed by: NURSE PRACTITIONER

## 2019-11-26 PROCEDURE — 99214 OFFICE O/P EST MOD 30 MIN: CPT | Performed by: NURSE PRACTITIONER

## 2019-11-26 RX ORDER — TAMSULOSIN HYDROCHLORIDE 0.4 MG/1
0.4 CAPSULE ORAL DAILY
Qty: 90 CAPSULE | Refills: 3 | Status: SHIPPED | OUTPATIENT
Start: 2019-11-26 | End: 2021-01-15

## 2019-11-26 RX ORDER — LEVOTHYROXINE SODIUM 88 UG/1
88 TABLET ORAL DAILY
Qty: 90 TABLET | Refills: 3 | Status: SHIPPED | OUTPATIENT
Start: 2019-11-26 | End: 2020-11-20

## 2019-12-22 ASSESSMENT — ENCOUNTER SYMPTOMS
VOMITING: 0
SHORTNESS OF BREATH: 0
WHEEZING: 0
NAUSEA: 0
CONSTIPATION: 0
EYE PAIN: 0
ABDOMINAL PAIN: 0
BACK PAIN: 0
COUGH: 0
EYE REDNESS: 0
COLOR CHANGE: 0

## 2020-03-17 ENCOUNTER — HOSPITAL ENCOUNTER (OUTPATIENT)
Age: 85
Discharge: HOME | End: 2020-03-17
Payer: MEDICARE

## 2020-03-17 LAB
ALT SERPL-CCNC: 13 U/L (ref 5–41)
ANION GAP SERPL CALCULATED.3IONS-SCNC: 11 MMOL/L (ref 9–17)
AST SERPL-CCNC: 17 U/L
BUN BLDV-MCNC: 27 MG/DL (ref 8–23)
BUN/CREAT BLD: 13 (ref 9–20)
CALCIUM SERPL-MCNC: 9.6 MG/DL (ref 8.6–10.4)
CHLORIDE BLD-SCNC: 107 MMOL/L (ref 98–107)
CHOLESTEROL/HDL RATIO: 3.5
CHOLESTEROL: 108 MG/DL
CO2: 25 MMOL/L (ref 20–31)
CREAT SERPL-MCNC: 2.13 MG/DL (ref 0.7–1.2)
ESTIMATED AVERAGE GLUCOSE: 120 MG/DL
GFR AFRICAN AMERICAN: 36 ML/MIN
GFR NON-AFRICAN AMERICAN: 30 ML/MIN
GFR SERPL CREATININE-BSD FRML MDRD: ABNORMAL ML/MIN/{1.73_M2}
GFR SERPL CREATININE-BSD FRML MDRD: ABNORMAL ML/MIN/{1.73_M2}
GLUCOSE BLD-MCNC: 99 MG/DL (ref 70–99)
HBA1C MFR BLD: 5.8 % (ref 4.8–5.9)
HCT VFR BLD CALC: 49.4 % (ref 40.7–50.3)
HDLC SERPL-MCNC: 31 MG/DL
HEMOGLOBIN: 15.7 G/DL (ref 13–17)
HIGH SENSITIVE C-REACTIVE PROTEIN: 10 MG/L
LDL CHOLESTEROL: 56 MG/DL (ref 0–130)
MCH RBC QN AUTO: 32.8 PG (ref 25.2–33.5)
MCHC RBC AUTO-ENTMCNC: 31.8 G/DL (ref 28.4–34.8)
MCV RBC AUTO: 103.3 FL (ref 82.6–102.9)
NRBC AUTOMATED: 0 PER 100 WBC
PDW BLD-RTO: 12.7 % (ref 11.8–14.4)
PLATELET # BLD: 274 K/UL (ref 138–453)
PMV BLD AUTO: 10.9 FL (ref 8.1–13.5)
POTASSIUM SERPL-SCNC: 5.1 MMOL/L (ref 3.7–5.3)
RBC # BLD: 4.78 M/UL (ref 4.21–5.77)
SODIUM BLD-SCNC: 143 MMOL/L (ref 135–144)
TRIGL SERPL-MCNC: 107 MG/DL
VLDLC SERPL CALC-MCNC: ABNORMAL MG/DL (ref 1–30)
WBC # BLD: 8.9 K/UL (ref 3.5–11.3)

## 2020-03-17 PROCEDURE — 84460 ALANINE AMINO (ALT) (SGPT): CPT

## 2020-03-17 PROCEDURE — 80061 LIPID PANEL: CPT

## 2020-03-17 PROCEDURE — 86141 C-REACTIVE PROTEIN HS: CPT

## 2020-03-17 PROCEDURE — 36415 COLL VENOUS BLD VENIPUNCTURE: CPT

## 2020-03-17 PROCEDURE — 83036 HEMOGLOBIN GLYCOSYLATED A1C: CPT

## 2020-03-17 PROCEDURE — 85027 COMPLETE CBC AUTOMATED: CPT

## 2020-03-17 PROCEDURE — 84450 TRANSFERASE (AST) (SGOT): CPT

## 2020-03-17 PROCEDURE — 80048 BASIC METABOLIC PNL TOTAL CA: CPT

## 2020-03-24 ENCOUNTER — OFFICE VISIT (OUTPATIENT)
Dept: FAMILY MEDICINE CLINIC | Age: 85
End: 2020-03-24
Payer: MEDICARE

## 2020-03-24 VITALS
HEIGHT: 67 IN | SYSTOLIC BLOOD PRESSURE: 106 MMHG | DIASTOLIC BLOOD PRESSURE: 70 MMHG | WEIGHT: 197 LBS | BODY MASS INDEX: 30.92 KG/M2

## 2020-03-24 PROCEDURE — 1123F ACP DISCUSS/DSCN MKR DOCD: CPT | Performed by: FAMILY MEDICINE

## 2020-03-24 PROCEDURE — G8417 CALC BMI ABV UP PARAM F/U: HCPCS | Performed by: FAMILY MEDICINE

## 2020-03-24 PROCEDURE — G8482 FLU IMMUNIZE ORDER/ADMIN: HCPCS | Performed by: FAMILY MEDICINE

## 2020-03-24 PROCEDURE — G8427 DOCREV CUR MEDS BY ELIG CLIN: HCPCS | Performed by: FAMILY MEDICINE

## 2020-03-24 PROCEDURE — 4040F PNEUMOC VAC/ADMIN/RCVD: CPT | Performed by: FAMILY MEDICINE

## 2020-03-24 PROCEDURE — 99213 OFFICE O/P EST LOW 20 MIN: CPT | Performed by: FAMILY MEDICINE

## 2020-03-24 PROCEDURE — 1036F TOBACCO NON-USER: CPT | Performed by: FAMILY MEDICINE

## 2020-03-24 PROCEDURE — 99214 OFFICE O/P EST MOD 30 MIN: CPT | Performed by: FAMILY MEDICINE

## 2020-03-24 ASSESSMENT — PATIENT HEALTH QUESTIONNAIRE - PHQ9
SUM OF ALL RESPONSES TO PHQ QUESTIONS 1-9: 0
2. FEELING DOWN, DEPRESSED OR HOPELESS: 0
SUM OF ALL RESPONSES TO PHQ9 QUESTIONS 1 & 2: 0
SUM OF ALL RESPONSES TO PHQ QUESTIONS 1-9: 0
1. LITTLE INTEREST OR PLEASURE IN DOING THINGS: 0

## 2020-03-24 NOTE — PATIENT INSTRUCTIONS
SURVEY:    You may be receiving a survey from [employer] regarding your visit today. Please complete the survey to enable us to provide the highest quality of care to you and your family. If you cannot score us a very good on any question, please call the office to discuss how we could have made your experience a very good one. Thank you. PLAN:    Patients lab work reviewed and his kidney function has stayed about the same. CRP is of no concern right now due to not having cardiac symptoms, vascular symptoms or a viral illness. Patient educated on the importance of hydration. Patient remains careful when ambulating and going up/down stairs to avoid falls. Educated on watching his sodium intake during the week. No changes to be made to his medications. Patient to return in 4 months with lab work to be done prior to the visit.

## 2020-03-24 NOTE — PROGRESS NOTES
Giselle Jonas, NP Student, am scribing for and in the presence of Dr. Rosanne Johnson. 3-24-20/1530/RF    [address]  [place] [id number]  Dept: [phone]    Cisco Cogan is a 80 y.o. male here for Follow-up (4 month )      HPI:  ASHD:  Patient presents for follow-up of atherosclerotic coronary artery disease. The patient denies chest pain, SOB, lightheadedness, palpitations and leg edema, he admits to no current symptoms. His most strenuous activity is walking and this never precipitates chest pain.      HYPERLIPIDEMIA   Medication compliance: compliant all of the time. Patient is following a low fat, low cholesterol diet. He is exercising regularly (walking).     HYPERTENSION:  He is not exercising, for n/a hours per week. He is adherent to a low-salt diet.    Blood pressure is not being monitored at home. Accompanied by son  Prior to Admission medications    Medication Sig Start Date End Date Taking?  Authorizing Provider   aspirin 81 MG tablet Take 81 mg by mouth daily   Yes Historical Provider, MD   levothyroxine (SYNTHROID) 88 MCG tablet Take 1 tablet by mouth daily 11/26/19  Yes Rosanne Johnson MD   tamsulosin [facility]) 0.4 MG capsule Take 1 capsule by mouth daily 11/26/19  Yes YOLIS Rivera CNP   finasteride (PROSCAR) 5 MG tablet Take 1 tablet by mouth daily 10/28/19  Yes YOLIS Rivera CNP   Multiple Vitamin (MULTI VITAMIN DAILY PO) Take by mouth daily   Yes Historical Provider, MD   vitamin C (ASCORBIC ACID) 500 MG tablet Take 1,000 mg by mouth 2 times daily    Yes Historical Provider, MD   buPROPion [facility] SR) 100 MG extended release tablet 100 mg daily  3/26/18  Yes Historical Provider, MD   atorvastatin (LIPITOR) 20 MG tablet daily  9/28/15  Yes Historical Provider, MD   Omega-3 Fatty Acids (FISH OIL) 1000 MG CAPS Take 1,000 mg by mouth 2 times daily    Yes Historical Provider, MD       ROS:  General Constitutional: Denies chills. Denies fever. Denies headache. Denies lightheadedness. Ophthalmologic: Denies blurred vision. ENT: Denies nasal congestion. Denies sore throat. Denies ear pain and pressure. Respiratory: Denies cough. Denies shortness of breath. Denies wheezing. Cardiovascular: Denies chest pain at rest. Denies irregular heartbeat. Denies palpitations. Gastrointestinal: Denies abdominal pain. Denies blood in the stool. Denies constipation. Denies diarrhea. Denies nausea. Denies vomiting. Genitourinary: Denies blood in the urine. Denies difficulty urinating. Denies frequent urination. Denies painful urination. Denies urinary incontinence. Musculoskeletal: Denies muscle aches. Denies painful joints. Denies swollen joints. Peripheral Vascular: Denies pain/cramping in legs after exertion. Skin: Denies dry skin. Denies itching. Denies rash. Neurologic: Denies falls. Denies dizziness. Denies fainting. Denies tingling/numbness. Psychiatric: Denies sleep disturbance. Denies anxiety. Denies depressed mood.     Past Surgical History:   Procedure Laterality Date    CORONARY ARTERY BYPASS GRAFT  08/21/2015     X3 W/ LIMA TO LAD    DIAGNOSTIC CARDIAC CATH LAB PROCEDURE  08/17/15    OTHER SURGICAL HISTORY  08/21/2015    endovascular vein harvesting    OTHER SURGICAL HISTORY  08/21/2015     normothermic cardiopulmonary bypass w/ cardioplegic arrest of the heart    OTHER SURGICAL HISTORY  1994    gallstones removed, Lap       Family History   Problem Relation Age of Onset    Depression Mother     Heart Attack Mother     Breast Cancer Sister        Past Medical History:   Diagnosis Date    Acute renal insufficiency 10/5/2015    Anxiety 10/14/2015    ASHD (arteriosclerotic heart disease) 10/14/2015    Bipolar disorder ([facility] [address] 1976    Essential hypertension 9/23/2016    Hyperlipidemia     Hypothyroidism     Localized osteoarthrosis, lower leg 10/14/2015    Parkinson's disease ([facility] [address]     light,       Social History     Tobacco Use    Smoking status: Former Smoker     Last attempt to quit: 1978     Years since quittin.2    Smokeless tobacco: Never Used   Substance Use Topics    Alcohol use: No      Current Outpatient Medications   Medication Sig Dispense Refill    aspirin 81 MG tablet Take 81 mg by mouth daily      levothyroxine (SYNTHROID) 88 MCG tablet Take 1 tablet by mouth daily 90 tablet 3    tamsulosin (FLOMAX) 0.4 MG capsule Take 1 capsule by mouth daily 90 capsule 3    finasteride (PROSCAR) 5 MG tablet Take 1 tablet by mouth daily 90 tablet 3    Multiple Vitamin (MULTI VITAMIN DAILY PO) Take by mouth daily      vitamin C (ASCORBIC ACID) 500 MG tablet Take 1,000 mg by mouth 2 times daily       buPROPion (WELLBUTRIN SR) 100 MG extended release tablet 100 mg daily       atorvastatin (LIPITOR) 20 MG tablet daily       Omega-3 Fatty Acids (FISH OIL) 1000 MG CAPS Take 1,000 mg by mouth 2 times daily        No current facility-administered medications for this visit. No Known Allergies    PHYSICAL EXAM:    /70   Ht 5' 7\" (1.702 m)   Wt 197 lb (89.4 kg)   BMI 30.85 kg/m²   Wt Readings from Last 3 Encounters:   20 197 lb (89.4 kg)   19 196 lb (88.9 kg)   19 196 lb (88.9 kg)     BP Readings from Last 3 Encounters:   20 106/70   19 120/70   19 108/64       General Appearance: in no acute distress, well developed, well nourished. Eyes: pupils equal, round reactive to light and accommodation. Wearing glasses  Ears: normal canal and TM's. Cerumen noted in bilateral ears  Nose: nares patent, no lesions. Oral Cavity: mucosa moist.  Throat: clear. Neck/Thyroid: neck supple, full range of motion, no cervical lymphadenopathy, no thyromegaly or carotid bruits. Skin: warm and dry. No suspicious lesions. Heart: regular rate and rhythm. No murmurs. S1, S2 normal, no gallops. HR 73  Lungs: clear to auscultation bilaterally.   Abdomen: bowel sounds present, soft, nontender, nondistended, no masses or organomegaly. Musculoskeletal: normal, full range of motion in knees and hips, no swelling or tenderness. Extremities: no cyanosis or edema. Peripheral Pulses: 2+ throughout, symetric. Neurologic: nonfocal, motor strength normal upper and lower extremities, sensory exam intact. Psych: normal affect, speech fluent. ASSESSMENT:   Diagnosis Orders   1. ASHD (arteriosclerotic heart disease)     2. Essential hypertension     3. Hyperlipidemia, unspecified hyperlipidemia type  ALT    AST    Basic Metabolic Panel    CBC Auto Differential    Lipid Panel   4. Hyperglycemia  Hemoglobin A1C       PLAN:    Patients lab work reviewed and his kidney function has stayed about the same. CRP is of no concern right now due to not having cardiac symptoms, vascular symptoms or a viral illness. Patient educated on the importance of hydration. Patient remains careful when ambulating and going up/down stairs to avoid falls. Educated on watching his sodium intake during the week. No changes to be made to his medications. Patient to return in 4 months with lab work to be done prior to the visit. Orders Placed This Encounter   Procedures    ALT     Standing Status:   Future     Standing Expiration Date:   3/24/2021    AST     Standing Status:   Future     Standing Expiration Date:   3/24/2021    Basic Metabolic Panel     Standing Status:   Future     Standing Expiration Date:   3/24/2021    CBC Auto Differential     Standing Status:   Future     Standing Expiration Date:   3/24/2021    Hemoglobin A1C     Standing Status:   Future     Standing Expiration Date:   3/24/2021    Lipid Panel     Standing Status:   Future     Standing Expiration Date:   3/24/2021     Order Specific Question:   Is Patient Fasting?/# of Hours     Answer:   no fasting     No orders of the defined types were placed in this encounter.   I, Dr. Danielito Owusu, personally performed the services

## 2020-06-22 ENCOUNTER — OFFICE VISIT (OUTPATIENT)
Dept: UROLOGY | Age: 85
End: 2020-06-22
Payer: MEDICARE

## 2020-06-22 VITALS
DIASTOLIC BLOOD PRESSURE: 48 MMHG | WEIGHT: 188 LBS | HEIGHT: 66 IN | BODY MASS INDEX: 30.22 KG/M2 | SYSTOLIC BLOOD PRESSURE: 110 MMHG | TEMPERATURE: 97.9 F

## 2020-06-22 PROCEDURE — 99213 OFFICE O/P EST LOW 20 MIN: CPT | Performed by: UROLOGY

## 2020-06-22 PROCEDURE — 51798 US URINE CAPACITY MEASURE: CPT | Performed by: UROLOGY

## 2020-06-22 PROCEDURE — 99211 OFF/OP EST MAY X REQ PHY/QHP: CPT | Performed by: UROLOGY

## 2020-06-22 ASSESSMENT — ENCOUNTER SYMPTOMS
NAUSEA: 0
VOMITING: 0
WHEEZING: 0
COUGH: 0
ABDOMINAL PAIN: 0
BACK PAIN: 0
SHORTNESS OF BREATH: 0
EYE REDNESS: 0
COLOR CHANGE: 0
EYE PAIN: 0

## 2020-06-22 NOTE — PATIENT INSTRUCTIONS
Timed voiding every 2-3 hours during the day    FOR CONSTIPATION    It is very important to have regular, soft, daily bowel movements, because it WILL improve your urinary symptoms. Take Miralax (or generic equivalent) 17g once daily (one capful). Take every day, DO NOT skip days, as it must be taken daily in order to be effective. DO NOT take just \"as needed\". It is safe to take long term and is recommended for your symptoms. If one dose daily causes loose stools/diarrhea, decrease to 1/2 capful or 1/4 capful. If you cannot tolerate this medication, please notify our office. If one dose daily of Miralax is not sufficient to produce a soft, easy to pass daily stool, you may also add an over-the-counter stool softener capsule. For example: colace (docusate). For Miralax to have maximal effectiveness, be sure to increase your water intake - aim for 80 oz daily unless you are on a fluid-restriction from another provider. SURVEY:    You may be receiving a survey from [employer] regarding your visit today. Please complete the survey to enable us to provide the highest quality of care to you and your family. If you cannot score us a very good on any question, please call the office to discuss how we could of made your experience a very good one. Thank you.

## 2020-06-22 NOTE — PROGRESS NOTES
osteoarthrosis, lower leg 10/14/2015    Parkinson's disease ([facility] [address]     light, 2010     Past Surgical History:   Procedure Laterality Date    CORONARY ARTERY BYPASS GRAFT  08/21/2015     X3 W/ LIMA TO LAD    DIAGNOSTIC CARDIAC CATH LAB PROCEDURE  08/17/15    OTHER SURGICAL HISTORY  08/21/2015    endovascular vein harvesting    OTHER SURGICAL HISTORY  08/21/2015     normothermic cardiopulmonary bypass w/ cardioplegic arrest of the heart    OTHER SURGICAL HISTORY  1994    gallstones removed, Lap     Outpatient Encounter Medications as of 6/22/2020   Medication Sig Dispense Refill    aspirin 81 MG tablet Take 81 mg by mouth daily      levothyroxine (SYNTHROID) 88 MCG tablet Take 1 tablet by mouth daily 90 tablet 3    tamsulosin (FLOMAX) 0.4 MG capsule Take 1 capsule by mouth daily 90 capsule 3    finasteride (PROSCAR) 5 MG tablet Take 1 tablet by mouth daily 90 tablet 3    Multiple Vitamin (MULTI VITAMIN DAILY PO) Take by mouth daily      vitamin C (ASCORBIC ACID) 500 MG tablet Take 1,000 mg by mouth 2 times daily       buPROPion (WELLBUTRIN SR) 100 MG extended release tablet 100 mg daily       atorvastatin (LIPITOR) 20 MG tablet daily       Omega-3 Fatty Acids (FISH OIL) 1000 MG CAPS Take 1,000 mg by mouth 2 times daily        No facility-administered encounter medications on file as of 6/22/2020.        Current Outpatient Medications on File Prior to Visit   Medication Sig Dispense Refill    aspirin 81 MG tablet Take 81 mg by mouth daily      levothyroxine (SYNTHROID) 88 MCG tablet Take 1 tablet by mouth daily 90 tablet 3    tamsulosin (FLOMAX) 0.4 MG capsule Take 1 capsule by mouth daily 90 capsule 3    finasteride (PROSCAR) 5 MG tablet Take 1 tablet by mouth daily 90 tablet 3    Multiple Vitamin (MULTI VITAMIN DAILY PO) Take by mouth daily      vitamin C (ASCORBIC ACID) 500 MG tablet Take 1,000 mg by mouth 2 times daily       buPROPion (WELLBUTRIN SR) 100 MG extended release tablet 100 mg daily

## 2020-07-28 ENCOUNTER — HOSPITAL ENCOUNTER (OUTPATIENT)
Age: 85
Discharge: HOME | End: 2020-07-28
Payer: MEDICARE

## 2020-07-28 LAB
ABSOLUTE EOS #: 0.42 K/UL (ref 0–0.44)
ABSOLUTE IMMATURE GRANULOCYTE: <0.03 K/UL (ref 0–0.3)
ABSOLUTE LYMPH #: 2.27 K/UL (ref 1.1–3.7)
ABSOLUTE MONO #: 0.97 K/UL (ref 0.1–1.2)
ALT SERPL-CCNC: 13 U/L (ref 5–41)
ANION GAP SERPL CALCULATED.3IONS-SCNC: 8 MMOL/L (ref 9–17)
AST SERPL-CCNC: 16 U/L
BASOPHILS # BLD: 1 % (ref 0–2)
BASOPHILS ABSOLUTE: 0.07 K/UL (ref 0–0.2)
BUN BLDV-MCNC: 29 MG/DL (ref 8–23)
BUN/CREAT BLD: 15 (ref 9–20)
CALCIUM SERPL-MCNC: 9.6 MG/DL (ref 8.6–10.4)
CHLORIDE BLD-SCNC: 110 MMOL/L (ref 98–107)
CHOLESTEROL/HDL RATIO: 2.9
CHOLESTEROL: 100 MG/DL
CO2: 25 MMOL/L (ref 20–31)
CREAT SERPL-MCNC: 2 MG/DL (ref 0.7–1.2)
DIFFERENTIAL TYPE: ABNORMAL
EOSINOPHILS RELATIVE PERCENT: 5 % (ref 1–4)
ESTIMATED AVERAGE GLUCOSE: 111 MG/DL
GFR AFRICAN AMERICAN: 39 ML/MIN
GFR NON-AFRICAN AMERICAN: 32 ML/MIN
GFR SERPL CREATININE-BSD FRML MDRD: ABNORMAL ML/MIN/{1.73_M2}
GFR SERPL CREATININE-BSD FRML MDRD: ABNORMAL ML/MIN/{1.73_M2}
GLUCOSE BLD-MCNC: 92 MG/DL (ref 70–99)
HBA1C MFR BLD: 5.5 % (ref 4.8–5.9)
HCT VFR BLD CALC: 50.2 % (ref 40.7–50.3)
HDLC SERPL-MCNC: 34 MG/DL
HEMOGLOBIN: 16.2 G/DL (ref 13–17)
IMMATURE GRANULOCYTES: 0 %
LDL CHOLESTEROL: 49 MG/DL (ref 0–130)
LYMPHOCYTES # BLD: 26 % (ref 24–43)
MCH RBC QN AUTO: 33.2 PG (ref 25.2–33.5)
MCHC RBC AUTO-ENTMCNC: 32.3 G/DL (ref 28.4–34.8)
MCV RBC AUTO: 102.9 FL (ref 82.6–102.9)
MONOCYTES # BLD: 11 % (ref 3–12)
NRBC AUTOMATED: 0 PER 100 WBC
PDW BLD-RTO: 13 % (ref 11.8–14.4)
PLATELET # BLD: 244 K/UL (ref 138–453)
PLATELET ESTIMATE: ABNORMAL
PMV BLD AUTO: 10.8 FL (ref 8.1–13.5)
POTASSIUM SERPL-SCNC: 4.6 MMOL/L (ref 3.7–5.3)
RBC # BLD: 4.88 M/UL (ref 4.21–5.77)
RBC # BLD: ABNORMAL 10*6/UL
SEG NEUTROPHILS: 57 % (ref 36–65)
SEGMENTED NEUTROPHILS ABSOLUTE COUNT: 5.12 K/UL (ref 1.5–8.1)
SODIUM BLD-SCNC: 143 MMOL/L (ref 135–144)
TRIGL SERPL-MCNC: 86 MG/DL
VLDLC SERPL CALC-MCNC: ABNORMAL MG/DL (ref 1–30)
WBC # BLD: 8.9 K/UL (ref 3.5–11.3)
WBC # BLD: ABNORMAL 10*3/UL

## 2020-07-28 PROCEDURE — 80061 LIPID PANEL: CPT

## 2020-07-28 PROCEDURE — 85025 COMPLETE CBC W/AUTO DIFF WBC: CPT

## 2020-07-28 PROCEDURE — 80048 BASIC METABOLIC PNL TOTAL CA: CPT

## 2020-07-28 PROCEDURE — 84460 ALANINE AMINO (ALT) (SGPT): CPT

## 2020-07-28 PROCEDURE — 84450 TRANSFERASE (AST) (SGOT): CPT

## 2020-07-28 PROCEDURE — 36415 COLL VENOUS BLD VENIPUNCTURE: CPT

## 2020-07-28 PROCEDURE — 83036 HEMOGLOBIN GLYCOSYLATED A1C: CPT

## 2020-07-31 ENCOUNTER — OFFICE VISIT (OUTPATIENT)
Dept: FAMILY MEDICINE CLINIC | Age: 85
End: 2020-07-31
Payer: MEDICARE

## 2020-07-31 VITALS
BODY MASS INDEX: 28.88 KG/M2 | HEIGHT: 67 IN | DIASTOLIC BLOOD PRESSURE: 50 MMHG | WEIGHT: 184 LBS | SYSTOLIC BLOOD PRESSURE: 86 MMHG

## 2020-07-31 PROCEDURE — 99214 OFFICE O/P EST MOD 30 MIN: CPT | Performed by: NURSE PRACTITIONER

## 2020-07-31 PROCEDURE — 99211 OFF/OP EST MAY X REQ PHY/QHP: CPT

## 2020-07-31 SDOH — ECONOMIC STABILITY: FOOD INSECURITY: WITHIN THE PAST 12 MONTHS, YOU WORRIED THAT YOUR FOOD WOULD RUN OUT BEFORE YOU GOT MONEY TO BUY MORE.: NEVER TRUE

## 2020-07-31 SDOH — ECONOMIC STABILITY: FOOD INSECURITY: WITHIN THE PAST 12 MONTHS, THE FOOD YOU BOUGHT JUST DIDN'T LAST AND YOU DIDN'T HAVE MONEY TO GET MORE.: NEVER TRUE

## 2020-07-31 SDOH — ECONOMIC STABILITY: INCOME INSECURITY: HOW HARD IS IT FOR YOU TO PAY FOR THE VERY BASICS LIKE FOOD, HOUSING, MEDICAL CARE, AND HEATING?: NOT HARD AT ALL

## 2020-07-31 ASSESSMENT — ENCOUNTER SYMPTOMS
SHORTNESS OF BREATH: 0
WHEEZING: 0
EYE ITCHING: 0
EYE PAIN: 0
SORE THROAT: 0
DIARRHEA: 0
CHEST TIGHTNESS: 0
ABDOMINAL PAIN: 0
COUGH: 0
RHINORRHEA: 0
CONSTIPATION: 0

## 2020-07-31 NOTE — PROGRESS NOTES
requirements and ordered appropriate tests  Health Maintenance Due   Topic Date Due    DTaP/Tdap/Td vaccine (1 - Tdap) 09/12/1953    Annual Wellness Visit (AWV)  05/29/2019    Shingles Vaccine (2 of 2) 07/18/2019    TSH testing  07/24/2020       Past Surgical History:     Past Surgical History:   Procedure Laterality Date    CORONARY ARTERY BYPASS GRAFT  08/21/2015     X3 W/ LIMA TO LAD    DIAGNOSTIC CARDIAC CATH LAB PROCEDURE  08/17/15    OTHER SURGICAL HISTORY  08/21/2015    endovascular vein harvesting    OTHER SURGICAL HISTORY  08/21/2015     normothermic cardiopulmonary bypass w/ cardioplegic arrest of the heart    OTHER SURGICAL HISTORY  1994    gallstones removed, Lap        Medications:       Prior to Admission medications    Medication Sig Start Date End Date Taking? Authorizing Provider   aspirin 81 MG tablet Take 81 mg by mouth daily   Yes Historical Provider, MD   levothyroxine (SYNTHROID) 88 MCG tablet Take 1 tablet by mouth daily 11/26/19  Yes Óscar Mueller MD   tamsulosin [facility]) 0.4 MG capsule Take 1 capsule by mouth daily 11/26/19  Yes YOLIS Keane CNP   finasteride (PROSCAR) 5 MG tablet Take 1 tablet by mouth daily 10/28/19  Yes YOLIS Keane CNP   Multiple Vitamin (MULTI VITAMIN DAILY PO) Take by mouth daily   Yes Historical Provider, MD   vitamin C (ASCORBIC ACID) 500 MG tablet Take 1,000 mg by mouth 2 times daily    Yes Historical Provider, MD   buPROPion [facility] SR) 100 MG extended release tablet 100 mg daily  3/26/18  Yes Historical Provider, MD   atorvastatin (LIPITOR) 20 MG tablet daily  9/28/15  Yes Historical Provider, MD   Omega-3 Fatty Acids (FISH OIL) 1000 MG CAPS Take 1,000 mg by mouth 2 times daily    Yes Historical Provider, MD        Allergies:       Patient has no known allergies. Social History:     Tobacco:    reports that he quit smoking about 42 years ago.  He has never used smokeless tobacco.  Alcohol:      reports no history of alcohol use. Drug Use:  has no history on file for drug. Family History:     Family History   Problem Relation Age of Onset    Depression Mother     Heart Attack Mother     Breast Cancer Sister        Review of Systems:     Positive and Negative as described in HPI    Review of Systems   Constitutional: Negative for chills and fever. HENT: Negative for congestion, rhinorrhea and sore throat. Eyes: Negative for pain and itching. Respiratory: Negative for cough, chest tightness, shortness of breath and wheezing. Cardiovascular: Negative for chest pain and palpitations. Gastrointestinal: Negative for abdominal pain, constipation and diarrhea. Genitourinary: Negative for difficulty urinating. Musculoskeletal: Negative for arthralgias, joint swelling and myalgias. Neurological: Positive for dizziness and light-headedness. Negative for headaches. Physical Exam:   Vitals:  BP (!) 86/50   Ht 5' 7\" (1.702 m)   Wt 184 lb (83.5 kg)   BMI 28.82 kg/m²     Physical Exam  Constitutional:       Appearance: Normal appearance. He is normal weight. HENT:      Head: Normocephalic. Right Ear: Tympanic membrane and external ear normal. There is impacted cerumen. Left Ear: Tympanic membrane, ear canal and external ear normal.      Nose: Nose normal. No congestion or rhinorrhea. Mouth/Throat:      Mouth: Mucous membranes are moist.   Eyes:      Extraocular Movements: Extraocular movements intact. Conjunctiva/sclera: Conjunctivae normal.      Pupils: Pupils are equal, round, and reactive to light. Neck:      Musculoskeletal: Neck supple. Vascular: No carotid bruit. Cardiovascular:      Rate and Rhythm: Normal rate and regular rhythm. Heart sounds: Normal heart sounds. No murmur. Pulmonary:      Effort: Pulmonary effort is normal. No respiratory distress. Breath sounds: Normal breath sounds. No wheezing. Abdominal:      General: Abdomen is flat.  Bowel sounds are normal. There is no distension. Palpations: Abdomen is soft. There is no mass. Tenderness: There is no abdominal tenderness. Hernia: No hernia is present. Musculoskeletal:      Comments: 5/5 strength upper and lower extremities    Lymphadenopathy:      Cervical: No cervical adenopathy. Skin:     General: Skin is warm and dry. Neurological:      Mental Status: He is alert and oriented to person, place, and time. Psychiatric:         Mood and Affect: Mood normal.         Behavior: Behavior normal.         Thought Content: Thought content normal.         Judgment: Judgment normal.         Data:     Lab Results   Component Value Date     07/28/2020    K 4.6 07/28/2020     07/28/2020    CO2 25 07/28/2020    BUN 29 07/28/2020    CREATININE 2.00 07/28/2020    GLUCOSE 92 07/28/2020    PROT 7.0 11/19/2019    LABALBU 3.6 11/19/2019    BILITOT 0.49 11/19/2019    ALKPHOS 98 11/19/2019    AST 16 07/28/2020    ALT 13 07/28/2020     Lab Results   Component Value Date    WBC 8.9 07/28/2020    RBC 4.88 07/28/2020    RBC 5.02 03/31/2012    HGB 16.2 07/28/2020    HCT 50.2 07/28/2020    .9 07/28/2020    MCH 33.2 07/28/2020    MCHC 32.3 07/28/2020    RDW 13.0 07/28/2020     07/28/2020     03/31/2012    MPV 10.8 07/28/2020     Lab Results   Component Value Date    TSH 0.75 07/24/2019     Lab Results   Component Value Date    CHOL 100 07/28/2020    HDL 34 07/28/2020    PSA 1.45 11/21/2013    LABA1C 5.5 07/28/2020       Assessment/Plan:      Diagnosis Orders   1. Essential hypertension  Basic Metabolic Panel    CBC With Auto Differential    ALT    AST   2. Hyperlipidemia, unspecified hyperlipidemia type  Lipid Panel    C-Reactive Protein High Sensitivity   3. Hyperglycemia     4. Thyroid disorder screening  TSH       - The patient's blood pressure was 86/50 today in the office. I encouraged ample hydration and instructed the patient to monitor his blood pressure at home daily.  He will call in his blood pressure results in 1 week. Signs of hypotension reviewed. - Patient instructed to continue fiber supplements as they have been successful in regulating his bowels. - Encouraged patient to continue exercise routine, balanced diet, and low salt intake. - I reviewed the patient's lab results with him and his son at length. We discussed how his kidney function has remained elevated, but stable. I encouraged ample hydration, low salt diet, and decrease NSAID use. I will continue to monitor this closely but do not believe that any changes in the treatment plan are necessary at this time. - Patient had cerumen impaction in right ear. Encouraged use of debrox or ear irrigation.   - Labs ordered. I believe that the patient is doing very well. I will continue to monitor his kidney function and will see him back for a follow up visit in 3 months. Completed Refills   Requested Prescriptions      No prescriptions requested or ordered in this encounter       Orders Placed This Encounter   Procedures    Basic Metabolic Panel     Standing Status:   Future     Standing Expiration Date:   7/31/2021    CBC With Auto Differential     Standing Status:   Future     Standing Expiration Date:   7/31/2021    ALT     Standing Status:   Future     Standing Expiration Date:   7/31/2021    AST     Standing Status:   Future     Standing Expiration Date:   7/31/2021    Lipid Panel     Standing Status:   Future     Standing Expiration Date:   7/31/2021     Order Specific Question:   Is Patient Fasting?/# of Hours     Answer:   no fasting    C-Reactive Protein High Sensitivity     Standing Status:   Future     Standing Expiration Date:   7/31/2021    TSH     Standing Status:   Future     Standing Expiration Date:   7/31/2021        No results found for this visit on 07/31/20. Return in about 3 months (around 10/31/2020), or if symptoms worsen or fail to improve.     Electronically signed by Ghislaine Giron APRN - CNP on 08/01/20 at 12:32 PM.

## 2020-08-10 ENCOUNTER — TELEPHONE (OUTPATIENT)
Dept: FAMILY MEDICINE CLINIC | Age: 85
End: 2020-08-10

## 2020-08-10 NOTE — TELEPHONE ENCOUNTER
8/3 pm 115/70  8/5 pm 118/73  8/7 am 104/65  8/10 pm 117/65    Will take on wed and fri and call back with results.

## 2020-08-11 NOTE — TELEPHONE ENCOUNTER
Please notify patient that these are satisfactory blood pressure readings. I am pleased to see his readings are higher than his previous reading of 86/50 on 7/31/20. Please encourage the patient to stay well hydrated, move slowly when transitioning from sitting to standing positions, and to alert the office of any symptoms of dizziness or lightheadedness. Thank you!

## 2020-08-24 ENCOUNTER — OFFICE VISIT (OUTPATIENT)
Dept: UROLOGY | Age: 85
End: 2020-08-24
Payer: MEDICARE

## 2020-08-24 VITALS
BODY MASS INDEX: 29 KG/M2 | SYSTOLIC BLOOD PRESSURE: 123 MMHG | DIASTOLIC BLOOD PRESSURE: 75 MMHG | TEMPERATURE: 98.3 F | HEART RATE: 73 BPM | HEIGHT: 67 IN | WEIGHT: 184.8 LBS

## 2020-08-24 PROCEDURE — 51798 US URINE CAPACITY MEASURE: CPT | Performed by: PHYSICIAN ASSISTANT

## 2020-08-24 PROCEDURE — G8417 CALC BMI ABV UP PARAM F/U: HCPCS | Performed by: PHYSICIAN ASSISTANT

## 2020-08-24 PROCEDURE — G8427 DOCREV CUR MEDS BY ELIG CLIN: HCPCS | Performed by: PHYSICIAN ASSISTANT

## 2020-08-24 PROCEDURE — 4040F PNEUMOC VAC/ADMIN/RCVD: CPT | Performed by: PHYSICIAN ASSISTANT

## 2020-08-24 PROCEDURE — 99213 OFFICE O/P EST LOW 20 MIN: CPT | Performed by: PHYSICIAN ASSISTANT

## 2020-08-24 PROCEDURE — 1036F TOBACCO NON-USER: CPT | Performed by: PHYSICIAN ASSISTANT

## 2020-08-24 PROCEDURE — 1123F ACP DISCUSS/DSCN MKR DOCD: CPT | Performed by: PHYSICIAN ASSISTANT

## 2020-08-24 RX ORDER — POLYETHYLENE GLYCOL 3350 17 G/17G
17 POWDER, FOR SOLUTION ORAL DAILY
COMMUNITY

## 2020-08-24 ASSESSMENT — ENCOUNTER SYMPTOMS
VOMITING: 0
COUGH: 0
CONSTIPATION: 0
NAUSEA: 0
SHORTNESS OF BREATH: 0
EYE REDNESS: 0
COLOR CHANGE: 0
EYE PAIN: 0
BACK PAIN: 0
ABDOMINAL PAIN: 0
WHEEZING: 0

## 2020-08-24 NOTE — PROGRESS NOTES
HPI:    Patient is a 80 y.o. male in no acute distress. He is alert and oriented to person, place, and time. History  2015 Difficulty voiding after open heart surgery.  ml after not voiding for 2.5 hours. We started Flomax and added Proscar. Encouraged double voiding for incomplete bladder emptying. PVR 70ml-360ml    10/2019 Increased Flomax to twice per day. Continued Proscar. Prior to his next visit we will check a BMP and renal ultrasound. Did explain to the patient and his son that we are trying to prevent worsening renal function, we will likely not be successful in improving the renal function. Renal function from 7/2019 reviewed: BUN 23, creatinine 2.04, GFR 31. Patient is on aspirin 325 mg daily. He does have an appointment next month with his cardiologist.  I am not sure if it would be beneficial to decrease the dose to 81 mg. We will have the patient back in 4 to 6 weeks to review his renal function, ultrasound and postvoid residual.  We may consider proceeding with cystoscopy, or continued observation. Patient does have a long history of incomplete bladder emptying. 11/2019 Flomax back to once a day     Today:  Patient returns for follow-up of BPH and incomplete bladder emptying. He is accompanied by his son. He denies any fever, chills, dizziness, dysuria, hematuria. Nocturia x3. He does have some urge incontinence. At last visit patient was started on MiraLAX and patient has been able to achieve a bowel movement most days. He is currently on Flomax daily and Proscar. Patient only drinks water. Patient denies any new or worsening symptoms. Patient was able to void 275 mL and had a postvoid residual of 308 mL. At last visit post void residual was 330 mL. Patient did have a BMP at the end of 7/20/2020 his BUN was 29, creatinine 2.0, GFR 32. This is stable compared to prior.       Past Medical History:   Diagnosis Date    Acute renal insufficiency 10/5/2015    Anxiety 10/14/2015    ASHD (arteriosclerotic heart disease) 10/14/2015    [facility] ([facility] 1976    Essential hypertension 9/23/2016    Hyperlipidemia     Hypothyroidism     Localized osteoarthrosis, lower leg 10/14/2015    Parkinson's disease ([facility]     light, 2010     Past Surgical History:   Procedure Laterality Date    CORONARY ARTERY BYPASS GRAFT  08/21/2015     X3 W/ LIMA TO LAD    DIAGNOSTIC CARDIAC CATH LAB PROCEDURE  08/17/15    OTHER SURGICAL HISTORY  08/21/2015    endovascular vein harvesting    OTHER SURGICAL HISTORY  08/21/2015     normothermic cardiopulmonary bypass w/ cardioplegic arrest of the heart    OTHER SURGICAL HISTORY  1994    gallstones removed, Lap     Outpatient Encounter Medications as of 8/24/2020   Medication Sig Dispense Refill    polyethylene glycol (GLYCOLAX) 17 GM/SCOOP powder Take 17 g by mouth daily      aspirin 81 MG tablet Take 81 mg by mouth daily      levothyroxine (SYNTHROID) 88 MCG tablet Take 1 tablet by mouth daily 90 tablet 3    tamsulosin (FLOMAX) 0.4 MG capsule Take 1 capsule by mouth daily 90 capsule 3    finasteride (PROSCAR) 5 MG tablet Take 1 tablet by mouth daily 90 tablet 3    Multiple Vitamin (MULTI VITAMIN DAILY PO) Take by mouth daily      vitamin C (ASCORBIC ACID) 500 MG tablet Take 1,000 mg by mouth 2 times daily       buPROPion (WELLBUTRIN SR) 100 MG extended release tablet 100 mg daily       atorvastatin (LIPITOR) 20 MG tablet daily       Omega-3 Fatty Acids (FISH OIL) 1000 MG CAPS Take 1,000 mg by mouth 2 times daily        No facility-administered encounter medications on file as of 8/24/2020.        Current Outpatient Medications on File Prior to Visit   Medication Sig Dispense Refill    polyethylene glycol (GLYCOLAX) 17 GM/SCOOP powder Take 17 g by mouth daily      aspirin 81 MG tablet Take 81 mg by mouth daily      levothyroxine (SYNTHROID) 88 MCG tablet Take 1 tablet by mouth daily 90 tablet 3    tamsulosin (FLOMAX) 0.4 MG capsule Take 1 capsule by mouth daily 90 capsule 3    finasteride (PROSCAR) 5 MG tablet Take 1 tablet by mouth daily 90 tablet 3    Multiple Vitamin (MULTI VITAMIN DAILY PO) Take by mouth daily      vitamin C (ASCORBIC ACID) 500 MG tablet Take 1,000 mg by mouth 2 times daily       buPROPion (WELLBUTRIN SR) 100 MG extended release tablet 100 mg daily       atorvastatin (LIPITOR) 20 MG tablet daily       Omega-3 Fatty Acids (FISH OIL) 1000 MG CAPS Take 1,000 mg by mouth 2 times daily        No current facility-administered medications on file prior to visit. Patient has no known allergies. Family History   Problem Relation Age of Onset    Depression Mother     Heart Attack Mother     Breast Cancer Sister      Social History     Tobacco Use   Smoking Status Former Smoker    Last attempt to quit: 1978    Years since quittin.6   Smokeless Tobacco Never Used       Social History     Substance and Sexual Activity   Alcohol Use No       Review of Systems   Constitutional: Negative for appetite change, chills and fever. Eyes: Negative for pain, redness and visual disturbance. Respiratory: Negative for cough, shortness of breath and wheezing. Cardiovascular: Negative for chest pain and leg swelling. Gastrointestinal: Negative for abdominal pain, constipation, nausea and vomiting. Genitourinary: Positive for enuresis and frequency. Negative for decreased urine volume, difficulty urinating, discharge, dysuria, flank pain, hematuria, penile pain, scrotal swelling, testicular pain and urgency. Musculoskeletal: Negative for back pain, joint swelling and myalgias. Skin: Negative for color change, rash and wound. Neurological: Negative for dizziness, tremors and numbness. Hematological: Negative for adenopathy. Does not bruise/bleed easily.        /75 (Site: Right Upper Arm, Position: Sitting, Cuff Size: Medium Adult)   Pulse 73   Temp 98.3 °F (36.8 °C) (Temporal)   Ht 5' 7\" (1.702 m)

## 2020-08-24 NOTE — PATIENT INSTRUCTIONS
SURVEY:    You may be receiving a survey from [employer] regarding your visit today. Please complete the survey to enable us to provide the highest quality of care to you and your family. If you cannot score us a very good on any question, please call the office to discuss how we could have made your experience a very good one. Thank you.

## 2020-10-12 ENCOUNTER — OFFICE VISIT (OUTPATIENT)
Dept: FAMILY MEDICINE CLINIC | Age: 85
End: 2020-10-12
Payer: MEDICARE

## 2020-10-12 VITALS — HEIGHT: 67 IN | WEIGHT: 188 LBS | BODY MASS INDEX: 29.51 KG/M2

## 2020-10-12 PROCEDURE — 99213 OFFICE O/P EST LOW 20 MIN: CPT | Performed by: NURSE PRACTITIONER

## 2020-10-12 PROCEDURE — 99211 OFF/OP EST MAY X REQ PHY/QHP: CPT | Performed by: NURSE PRACTITIONER

## 2020-10-12 NOTE — PROGRESS NOTES
Admission medications    Medication Sig Start Date End Date Taking? Authorizing Provider   polyethylene glycol (GLYCOLAX) 17 GM/SCOOP powder Take 17 g by mouth daily   Yes Historical Provider, MD   aspirin 81 MG tablet Take 81 mg by mouth daily   Yes Historical Provider, MD   levothyroxine (SYNTHROID) 88 MCG tablet Take 1 tablet by mouth daily 11/26/19  Yes Ayaan Ames MD   tamsulosin [facility]) 0.4 MG capsule Take 1 capsule by mouth daily 11/26/19  Yes YOLIS Benton CNP   finasteride (PROSCAR) 5 MG tablet Take 1 tablet by mouth daily 10/28/19  Yes YOLIS Benton CNP   Multiple Vitamin (MULTI VITAMIN DAILY PO) Take by mouth daily   Yes Historical Provider, MD   vitamin C (ASCORBIC ACID) 500 MG tablet Take 1,000 mg by mouth 2 times daily    Yes Historical Provider, MD   buPROPion [facility] SR) 100 MG extended release tablet 100 mg daily  3/26/18  Yes Historical Provider, MD   atorvastatin (LIPITOR) 20 MG tablet daily  9/28/15  Yes Historical Provider, MD   Omega-3 Fatty Acids (FISH OIL) 1000 MG CAPS Take 1,000 mg by mouth 2 times daily    Yes Historical Provider, MD        Allergies:       Patient has no known allergies. Social History:     Tobacco:    reports that he quit smoking about 42 years ago. He has never used smokeless tobacco.  Alcohol:      reports no history of alcohol use. Drug Use:  has no history on file for drug. Family History:     Family History   Problem Relation Age of Onset    Depression Mother     Heart Attack Mother     Breast Cancer Sister        Review of Systems:     Positive and Negative as described in HPI    Review of Systems   HENT: Negative for ear discharge and ear pain. Decreased hearing, left ear       Physical Exam:   Vitals:  Ht 5' 7\" (1.702 m)   Wt 188 lb (85.3 kg)   BMI 29.44 kg/m²     Physical Exam  Constitutional:       General: He is not in acute distress. Appearance: Normal appearance. He is normal weight.  He is not ill-appearing or toxic-appearing. HENT:      Head: Normocephalic. Right Ear: External ear normal. There is impacted cerumen. Left Ear: External ear normal. There is impacted cerumen. Cardiovascular:      Rate and Rhythm: Normal rate and regular rhythm. Heart sounds: Normal heart sounds. No murmur. Pulmonary:      Effort: Pulmonary effort is normal. No respiratory distress. Breath sounds: Normal breath sounds. No wheezing or rales. Skin:     General: Skin is warm. Neurological:      Mental Status: He is alert. Psychiatric:         Mood and Affect: Mood normal.         Behavior: Behavior normal.         Thought Content: Thought content normal.         Judgment: Judgment normal.         Data:     Lab Results   Component Value Date     07/28/2020    K 4.6 07/28/2020     07/28/2020    CO2 25 07/28/2020    BUN 29 07/28/2020    CREATININE 2.00 07/28/2020    GLUCOSE 92 07/28/2020    PROT 7.0 11/19/2019    LABALBU 3.6 11/19/2019    BILITOT 0.49 11/19/2019    ALKPHOS 98 11/19/2019    AST 16 07/28/2020    ALT 13 07/28/2020     Lab Results   Component Value Date    WBC 8.9 07/28/2020    RBC 4.88 07/28/2020    RBC 5.02 03/31/2012    HGB 16.2 07/28/2020    HCT 50.2 07/28/2020    .9 07/28/2020    MCH 33.2 07/28/2020    MCHC 32.3 07/28/2020    RDW 13.0 07/28/2020     07/28/2020     03/31/2012    MPV 10.8 07/28/2020     Lab Results   Component Value Date    TSH 0.75 07/24/2019     Lab Results   Component Value Date    CHOL 100 07/28/2020    HDL 34 07/28/2020    PSA 1.45 11/21/2013    LABA1C 5.5 07/28/2020       Assessment/Plan:      Diagnosis Orders   1. Impacted cerumen of both ears         - Bilateral ear irrigation performed today.  Following the procedure, patient's ear canals were clear.   - Discouraged use of routine debrox administration.   - Discussed potential need for routine ear irrigations with debrox application 3-4 days prior.   - Patient admits improved

## 2020-10-26 ENCOUNTER — HOSPITAL ENCOUNTER (OUTPATIENT)
Age: 85
Discharge: HOME | End: 2020-10-26
Payer: MEDICARE

## 2020-10-26 LAB
ABSOLUTE EOS #: 0.44 K/UL (ref 0–0.44)
ABSOLUTE IMMATURE GRANULOCYTE: <0.03 K/UL (ref 0–0.3)
ABSOLUTE LYMPH #: 1.87 K/UL (ref 1.1–3.7)
ABSOLUTE MONO #: 0.86 K/UL (ref 0.1–1.2)
ALT SERPL-CCNC: 12 U/L (ref 5–41)
ANION GAP SERPL CALCULATED.3IONS-SCNC: 10 MMOL/L (ref 9–17)
AST SERPL-CCNC: 15 U/L
BASOPHILS # BLD: 1 % (ref 0–2)
BASOPHILS ABSOLUTE: 0.06 K/UL (ref 0–0.2)
BUN BLDV-MCNC: 25 MG/DL (ref 8–23)
BUN/CREAT BLD: 13 (ref 9–20)
CALCIUM SERPL-MCNC: 9.6 MG/DL (ref 8.6–10.4)
CHLORIDE BLD-SCNC: 108 MMOL/L (ref 98–107)
CHOLESTEROL/HDL RATIO: 2.9
CHOLESTEROL: 97 MG/DL
CO2: 25 MMOL/L (ref 20–31)
CREAT SERPL-MCNC: 1.94 MG/DL (ref 0.7–1.2)
DIFFERENTIAL TYPE: ABNORMAL
EOSINOPHILS RELATIVE PERCENT: 5 % (ref 1–4)
GFR AFRICAN AMERICAN: 40 ML/MIN
GFR NON-AFRICAN AMERICAN: 33 ML/MIN
GFR SERPL CREATININE-BSD FRML MDRD: ABNORMAL ML/MIN/{1.73_M2}
GFR SERPL CREATININE-BSD FRML MDRD: ABNORMAL ML/MIN/{1.73_M2}
GLUCOSE BLD-MCNC: 103 MG/DL (ref 70–99)
HCT VFR BLD CALC: 46.9 % (ref 40.7–50.3)
HDLC SERPL-MCNC: 34 MG/DL
HEMOGLOBIN: 15.4 G/DL (ref 13–17)
HIGH SENSITIVE C-REACTIVE PROTEIN: 2.4 MG/L
IMMATURE GRANULOCYTES: 0 %
LDL CHOLESTEROL: 47 MG/DL (ref 0–130)
LYMPHOCYTES # BLD: 22 % (ref 24–43)
MCH RBC QN AUTO: 33.2 PG (ref 25.2–33.5)
MCHC RBC AUTO-ENTMCNC: 32.8 G/DL (ref 28.4–34.8)
MCV RBC AUTO: 101.1 FL (ref 82.6–102.9)
MONOCYTES # BLD: 10 % (ref 3–12)
NRBC AUTOMATED: 0 PER 100 WBC
PDW BLD-RTO: 13 % (ref 11.8–14.4)
PLATELET # BLD: 216 K/UL (ref 138–453)
PLATELET ESTIMATE: ABNORMAL
PMV BLD AUTO: 10.7 FL (ref 8.1–13.5)
POTASSIUM SERPL-SCNC: 4.8 MMOL/L (ref 3.7–5.3)
RBC # BLD: 4.64 M/UL (ref 4.21–5.77)
RBC # BLD: ABNORMAL 10*6/UL
SEG NEUTROPHILS: 62 % (ref 36–65)
SEGMENTED NEUTROPHILS ABSOLUTE COUNT: 5.32 K/UL (ref 1.5–8.1)
SODIUM BLD-SCNC: 143 MMOL/L (ref 135–144)
TRIGL SERPL-MCNC: 81 MG/DL
TSH SERPL DL<=0.05 MIU/L-ACNC: 1.2 MIU/L (ref 0.3–5)
VLDLC SERPL CALC-MCNC: ABNORMAL MG/DL (ref 1–30)
WBC # BLD: 8.6 K/UL (ref 3.5–11.3)
WBC # BLD: ABNORMAL 10*3/UL

## 2020-10-26 PROCEDURE — 85025 COMPLETE CBC W/AUTO DIFF WBC: CPT

## 2020-10-26 PROCEDURE — 80048 BASIC METABOLIC PNL TOTAL CA: CPT

## 2020-10-26 PROCEDURE — 80061 LIPID PANEL: CPT

## 2020-10-26 PROCEDURE — 36415 COLL VENOUS BLD VENIPUNCTURE: CPT

## 2020-10-26 PROCEDURE — 84460 ALANINE AMINO (ALT) (SGPT): CPT

## 2020-10-26 PROCEDURE — 84450 TRANSFERASE (AST) (SGOT): CPT

## 2020-10-26 PROCEDURE — 84443 ASSAY THYROID STIM HORMONE: CPT

## 2020-10-26 PROCEDURE — 86141 C-REACTIVE PROTEIN HS: CPT

## 2020-10-30 ENCOUNTER — OFFICE VISIT (OUTPATIENT)
Dept: FAMILY MEDICINE CLINIC | Age: 85
End: 2020-10-30
Payer: MEDICARE

## 2020-10-30 VITALS
DIASTOLIC BLOOD PRESSURE: 65 MMHG | WEIGHT: 184 LBS | SYSTOLIC BLOOD PRESSURE: 135 MMHG | OXYGEN SATURATION: 96 % | HEIGHT: 67 IN | HEART RATE: 74 BPM | BODY MASS INDEX: 28.88 KG/M2

## 2020-10-30 PROCEDURE — 99214 OFFICE O/P EST MOD 30 MIN: CPT | Performed by: FAMILY MEDICINE

## 2020-10-30 PROCEDURE — G8417 CALC BMI ABV UP PARAM F/U: HCPCS | Performed by: FAMILY MEDICINE

## 2020-10-30 PROCEDURE — G8427 DOCREV CUR MEDS BY ELIG CLIN: HCPCS | Performed by: FAMILY MEDICINE

## 2020-10-30 PROCEDURE — 1036F TOBACCO NON-USER: CPT | Performed by: FAMILY MEDICINE

## 2020-10-30 PROCEDURE — G8484 FLU IMMUNIZE NO ADMIN: HCPCS | Performed by: FAMILY MEDICINE

## 2020-10-30 PROCEDURE — 4040F PNEUMOC VAC/ADMIN/RCVD: CPT | Performed by: FAMILY MEDICINE

## 2020-10-30 PROCEDURE — 99211 OFF/OP EST MAY X REQ PHY/QHP: CPT | Performed by: FAMILY MEDICINE

## 2020-10-30 PROCEDURE — 1123F ACP DISCUSS/DSCN MKR DOCD: CPT | Performed by: FAMILY MEDICINE

## 2020-10-30 NOTE — PROGRESS NOTES
I, Arturo [place], [facility], am scribing for and in the presence of Dr. Sophia Casiano. 10/30/20/3:30 pm/L    [id number] [address]  [place] [phone]  Dept: [address] [place] [address] [facility] is a 80 y.o. male here for 3 Month Follow-Up; Hypertension; and Hyperlipidemia    HPI:  ASHD:  Patient presents for follow-up of atherosclerotic coronary artery disease. The patient denies chest pain, SOB, lightheadedness, palpitations and leg edema, he admits to no current symptoms. His most strenuous activity is walking and this never precipitates chest pain.      HYPERLIPIDEMIA   Medication compliance: compliant all of the time. Patient is following a low fat, low cholesterol diet. He is exercising regularly (walking).     HYPERTENSION:  He is not exercising, for n/a hours per week. He is adherent to a low-salt diet.    Blood pressure is not being monitored at home. Prior to Admission medications    Medication Sig Start Date End Date Taking?  Authorizing Provider   polyethylene glycol (GLYCOLAX) 17 GM/SCOOP powder Take 17 g by mouth daily   Yes Historical Provider, MD   aspirin 81 MG tablet Take 81 mg by mouth daily   Yes Historical Provider, MD   levothyroxine (SYNTHROID) 88 MCG tablet Take 1 tablet by mouth daily 11/26/19  Yes Sophia Casiano MD   tamsulosin [facility]) 0.4 MG capsule Take 1 capsule by mouth daily 11/26/19  Yes YOLIS Garzon CNP   finasteride (PROSCAR) 5 MG tablet Take 1 tablet by mouth daily 10/28/19  Yes YOLIS Garzon CNP   Multiple Vitamin (MULTI VITAMIN DAILY PO) Take by mouth daily   Yes Historical Provider, MD   vitamin C (ASCORBIC ACID) 500 MG tablet Take 1,000 mg by mouth 2 times daily    Yes Historical Provider, MD   buPROPion [facility] SR) 100 MG extended release tablet 100 mg daily  3/26/18  Yes Historical Provider, MD   atorvastatin (LIPITOR) 20 MG tablet daily  9/28/15  Yes Historical Provider, MD   Omega-3 Fatty Acids ([address]) 1000 MG CAPS Take 1,000 mg by mouth 2 times daily    Yes Historical Provider, MD       ROS:  General Constitutional: Denies chills. Denies fever. Denies headache. Denies lightheadedness. Ophthalmologic: Denies blurred vision. ENT: Denies nasal congestion. Denies sore throat. Denies ear pain and pressure. Respiratory: Denies cough. Denies shortness of breath. Denies wheezing. Cardiovascular: Denies chest pain at rest. Denies irregular heartbeat. Denies palpitations. Gastrointestinal: Denies abdominal pain. Denies blood in the stool. Denies constipation. Denies diarrhea. Denies nausea. Denies vomiting. Genitourinary: Denies blood in the urine. Denies difficulty urinating. Denies frequent urination. Denies painful urination. Denies urinary incontinence. Musculoskeletal: Denies muscle aches. Denies painful joints. Denies swollen joints. Peripheral Vascular: Denies pain/cramping in legs after exertion. Skin: Denies dry skin. Denies itching. Denies rash. Neurologic: Denies falls. Denies dizziness. Denies fainting. Denies tingling/numbness. Psychiatric: Denies sleep disturbance. Denies anxiety. Denies depressed mood.     Past Surgical History:   Procedure Laterality Date    CORONARY ARTERY BYPASS GRAFT  08/21/2015     X3 W/ LIMA TO LAD    DIAGNOSTIC CARDIAC CATH LAB PROCEDURE  08/17/15    OTHER SURGICAL HISTORY  08/21/2015    endovascular vein harvesting    OTHER SURGICAL HISTORY  08/21/2015     normothermic cardiopulmonary bypass w/ cardioplegic arrest of the heart    OTHER SURGICAL HISTORY  1994    gallstones removed, Lap       Family History   Problem Relation Age of Onset    Depression Mother     Heart Attack Mother     Breast Cancer Sister        Past Medical History:   Diagnosis Date    Acute renal insufficiency 10/5/2015    Anxiety 10/14/2015    ASHD (arteriosclerotic heart disease) 10/14/2015    [facility] disorder ([facility]    Essential hypertension 9/23/2016    Hyperlipidemia     Hypothyroidism     Localized osteoarthrosis, lower leg 10/14/2015    [facility]'s disease ([facility]     light, 2010      Social History     Tobacco Use    Smoking status: Former Smoker     Last attempt to quit: 1978     Years since quittin.8    Smokeless tobacco: Never Used   Substance Use Topics    Alcohol use: No      Current Outpatient Medications   Medication Sig Dispense Refill    polyethylene glycol (GLYCOLAX) 17 GM/SCOOP powder Take 17 g by mouth daily      aspirin 81 MG tablet Take 81 mg by mouth daily      levothyroxine (SYNTHROID) 88 MCG tablet Take 1 tablet by mouth daily 90 tablet 3    tamsulosin (FLOMAX) 0.4 MG capsule Take 1 capsule by mouth daily 90 capsule 3    finasteride (PROSCAR) 5 MG tablet Take 1 tablet by mouth daily 90 tablet 3    Multiple Vitamin (MULTI VITAMIN DAILY PO) Take by mouth daily      vitamin C (ASCORBIC ACID) 500 MG tablet Take 1,000 mg by mouth 2 times daily       buPROPion (WELLBUTRIN SR) 100 MG extended release tablet 100 mg daily       atorvastatin (LIPITOR) 20 MG tablet daily       Omega-3 Fatty Acids (FISH OIL) 1000 MG CAPS Take 1,000 mg by mouth 2 times daily        No current facility-administered medications for this visit. No Known Allergies    PHYSICAL EXAM:    /65   Pulse 74   Ht 5' 7\" (1.702 m)   Wt 184 lb (83.5 kg)   SpO2 96%   BMI 28.82 kg/m²   Wt Readings from Last 3 Encounters:   10/30/20 184 lb (83.5 kg)   10/12/20 188 lb (85.3 kg)   20 184 lb 12.8 oz (83.8 kg)     BP Readings from Last 3 Encounters:   10/30/20 135/65   20 123/75   20 (!) 86/50       General Appearance: in no acute distress, well developed, well nourished. Eyes: pupils equal, round reactive to light and accommodation. Wearing glasses  Ears: normal canal and TM's. Nose: nares patent, no lesions. Oral Cavity: mucosa moist.  Throat: clear.   Neck/Thyroid: neck supple, full range of motion, no cervical lymphadenopathy, no thyromegaly or carotid bruits. Skin: warm and dry. No suspicious lesions. Heart: regular rate and rhythm. No murmurs. S1, S2 normal, no gallops. Lungs: clear to auscultation bilaterally. Abdomen: bowel sounds present, soft, nontender, nondistended, no masses or organomegaly. Musculoskeletal: normal, full range of motion in knees and hips, no swelling or tenderness. Extremities: no cyanosis or edema. Peripheral Pulses: 2+ throughout, symetric. Neurologic: nonfocal, motor strength normal upper and lower extremities, sensory exam intact. Psych: normal affect, speech fluent. ASSESSMENT:   Diagnosis Orders   1. ASHD (arteriosclerotic heart disease)     2. Essential hypertension     3. Hyperlipidemia, unspecified hyperlipidemia type     4. Hyperglycemia     5. Chronic renal failure, stage 3 (moderate), unspecified whether stage 3a or 3b CKD         PLAN:  He looks great today  I will not make any changes  I will see him back in 3 months with labs prior    No orders of the defined types were placed in this encounter. No orders of the defined types were placed in this encounter.

## 2020-11-06 RX ORDER — FINASTERIDE 5 MG/1
TABLET, FILM COATED ORAL
Qty: 90 TABLET | Refills: 0 | Status: SHIPPED | OUTPATIENT
Start: 2020-11-06 | End: 2021-02-08

## 2020-11-20 RX ORDER — LEVOTHYROXINE SODIUM 88 UG/1
88 TABLET ORAL DAILY
Qty: 90 TABLET | Refills: 0 | Status: SHIPPED | OUTPATIENT
Start: 2020-11-20 | End: 2021-05-21

## 2021-01-15 RX ORDER — TAMSULOSIN HYDROCHLORIDE 0.4 MG/1
0.4 CAPSULE ORAL DAILY
Qty: 90 CAPSULE | Refills: 0 | Status: SHIPPED | OUTPATIENT
Start: 2021-01-15 | End: 2021-04-12

## 2021-02-02 ENCOUNTER — HOSPITAL ENCOUNTER (OUTPATIENT)
Age: 86
Discharge: HOME | End: 2021-02-02
Payer: MEDICARE

## 2021-02-02 DIAGNOSIS — E78.5 HYPERLIPIDEMIA, UNSPECIFIED HYPERLIPIDEMIA TYPE: ICD-10-CM

## 2021-02-02 LAB
ALT SERPL-CCNC: 16 U/L (ref 5–41)
ANION GAP SERPL CALCULATED.3IONS-SCNC: 7 MMOL/L (ref 9–17)
AST SERPL-CCNC: 17 U/L
BUN BLDV-MCNC: 34 MG/DL (ref 8–23)
BUN/CREAT BLD: 16 (ref 9–20)
CALCIUM SERPL-MCNC: 9.7 MG/DL (ref 8.6–10.4)
CHLORIDE BLD-SCNC: 107 MMOL/L (ref 98–107)
CHOLESTEROL/HDL RATIO: 2.4
CHOLESTEROL: 103 MG/DL
CO2: 26 MMOL/L (ref 20–31)
CREAT SERPL-MCNC: 2.16 MG/DL (ref 0.7–1.2)
GFR AFRICAN AMERICAN: 35 ML/MIN
GFR NON-AFRICAN AMERICAN: 29 ML/MIN
GFR SERPL CREATININE-BSD FRML MDRD: ABNORMAL ML/MIN/{1.73_M2}
GFR SERPL CREATININE-BSD FRML MDRD: ABNORMAL ML/MIN/{1.73_M2}
GLUCOSE BLD-MCNC: 94 MG/DL (ref 70–99)
HCT VFR BLD CALC: 49.9 % (ref 40.7–50.3)
HDLC SERPL-MCNC: 43 MG/DL
HEMOGLOBIN: 16 G/DL (ref 13–17)
LDL CHOLESTEROL: 48 MG/DL (ref 0–130)
MCH RBC QN AUTO: 33.3 PG (ref 25.2–33.5)
MCHC RBC AUTO-ENTMCNC: 32.1 G/DL (ref 28.4–34.8)
MCV RBC AUTO: 103.7 FL (ref 82.6–102.9)
NRBC AUTOMATED: 0 PER 100 WBC
PDW BLD-RTO: 12.8 % (ref 11.8–14.4)
PLATELET # BLD: 232 K/UL (ref 138–453)
PMV BLD AUTO: 10.8 FL (ref 8.1–13.5)
POTASSIUM SERPL-SCNC: 4.9 MMOL/L (ref 3.7–5.3)
RBC # BLD: 4.81 M/UL (ref 4.21–5.77)
SODIUM BLD-SCNC: 140 MMOL/L (ref 135–144)
TRIGL SERPL-MCNC: 61 MG/DL
VLDLC SERPL CALC-MCNC: NORMAL MG/DL (ref 1–30)
WBC # BLD: 9.8 K/UL (ref 3.5–11.3)

## 2021-02-02 PROCEDURE — 84450 TRANSFERASE (AST) (SGOT): CPT

## 2021-02-02 PROCEDURE — 84460 ALANINE AMINO (ALT) (SGPT): CPT

## 2021-02-02 PROCEDURE — 80061 LIPID PANEL: CPT

## 2021-02-02 PROCEDURE — 80048 BASIC METABOLIC PNL TOTAL CA: CPT

## 2021-02-02 PROCEDURE — 85027 COMPLETE CBC AUTOMATED: CPT

## 2021-02-02 PROCEDURE — 36415 COLL VENOUS BLD VENIPUNCTURE: CPT

## 2021-02-05 ENCOUNTER — OFFICE VISIT (OUTPATIENT)
Dept: FAMILY MEDICINE CLINIC | Age: 86
End: 2021-02-05
Payer: MEDICARE

## 2021-02-05 VITALS
HEIGHT: 67 IN | SYSTOLIC BLOOD PRESSURE: 90 MMHG | BODY MASS INDEX: 28.88 KG/M2 | DIASTOLIC BLOOD PRESSURE: 62 MMHG | WEIGHT: 184 LBS

## 2021-02-05 DIAGNOSIS — I25.10 ASHD (ARTERIOSCLEROTIC HEART DISEASE): Primary | ICD-10-CM

## 2021-02-05 DIAGNOSIS — D75.89 MACROCYTOSIS WITHOUT ANEMIA: ICD-10-CM

## 2021-02-05 DIAGNOSIS — E78.5 HYPERLIPIDEMIA, UNSPECIFIED HYPERLIPIDEMIA TYPE: ICD-10-CM

## 2021-02-05 DIAGNOSIS — N18.30 CHRONIC RENAL FAILURE, STAGE 3 (MODERATE), UNSPECIFIED WHETHER STAGE 3A OR 3B CKD (HCC): ICD-10-CM

## 2021-02-05 DIAGNOSIS — E03.9 HYPOTHYROIDISM, UNSPECIFIED TYPE: ICD-10-CM

## 2021-02-05 DIAGNOSIS — R73.9 HYPERGLYCEMIA: ICD-10-CM

## 2021-02-05 DIAGNOSIS — I10 ESSENTIAL HYPERTENSION: ICD-10-CM

## 2021-02-05 DIAGNOSIS — L57.0 AK (ACTINIC KERATOSIS): ICD-10-CM

## 2021-02-05 PROCEDURE — 1123F ACP DISCUSS/DSCN MKR DOCD: CPT | Performed by: FAMILY MEDICINE

## 2021-02-05 PROCEDURE — 4040F PNEUMOC VAC/ADMIN/RCVD: CPT | Performed by: FAMILY MEDICINE

## 2021-02-05 PROCEDURE — 99211 OFF/OP EST MAY X REQ PHY/QHP: CPT | Performed by: FAMILY MEDICINE

## 2021-02-05 PROCEDURE — G0439 PPPS, SUBSEQ VISIT: HCPCS | Performed by: FAMILY MEDICINE

## 2021-02-05 PROCEDURE — G8484 FLU IMMUNIZE NO ADMIN: HCPCS | Performed by: FAMILY MEDICINE

## 2021-02-05 RX ORDER — IMIQUIMOD 12.5 MG/.25G
CREAM TOPICAL
Qty: 1 BOX | Refills: 1 | Status: SHIPPED | OUTPATIENT
Start: 2021-02-05 | End: 2021-02-12

## 2021-02-05 ASSESSMENT — PATIENT HEALTH QUESTIONNAIRE - PHQ9
SUM OF ALL RESPONSES TO PHQ QUESTIONS 1-9: 0
SUM OF ALL RESPONSES TO PHQ QUESTIONS 1-9: 0
1. LITTLE INTEREST OR PLEASURE IN DOING THINGS: 0

## 2021-02-05 ASSESSMENT — LIFESTYLE VARIABLES: HOW OFTEN DO YOU HAVE A DRINK CONTAINING ALCOHOL: 0

## 2021-02-05 NOTE — PATIENT INSTRUCTIONS
I reviewed his MOCA with him which shows some visuospacial cognitive issues. He drives once a week and has not had any issues. I will give him some Aldara cream to use on his forehead 3 nights a week x 4 weeks. If this does not go away I will biopsy this area    He looks good otherwise, I will see him back for his next reg check in 4 months    SURVEY:    You may be receiving a survey from [employer] regarding your visit today. Please complete the survey to enable us to provide the highest quality of care to you and your family. If you cannot score us a very good on any question, please call the office to discuss how we could of made your experience a very good one. Thank you.

## 2021-02-08 ENCOUNTER — OFFICE VISIT (OUTPATIENT)
Dept: UROLOGY | Age: 86
End: 2021-02-08
Payer: MEDICARE

## 2021-02-08 VITALS
WEIGHT: 185 LBS | SYSTOLIC BLOOD PRESSURE: 127 MMHG | HEART RATE: 90 BPM | BODY MASS INDEX: 28.98 KG/M2 | TEMPERATURE: 97.7 F | DIASTOLIC BLOOD PRESSURE: 84 MMHG

## 2021-02-08 DIAGNOSIS — N40.1 BENIGN PROSTATIC HYPERPLASIA WITH URINARY RETENTION: Primary | ICD-10-CM

## 2021-02-08 DIAGNOSIS — R33.9 INCOMPLETE BLADDER EMPTYING: ICD-10-CM

## 2021-02-08 DIAGNOSIS — K59.09 OTHER CONSTIPATION: ICD-10-CM

## 2021-02-08 DIAGNOSIS — R33.8 BENIGN PROSTATIC HYPERPLASIA WITH URINARY RETENTION: Primary | ICD-10-CM

## 2021-02-08 PROCEDURE — 4040F PNEUMOC VAC/ADMIN/RCVD: CPT | Performed by: PHYSICIAN ASSISTANT

## 2021-02-08 PROCEDURE — 1036F TOBACCO NON-USER: CPT | Performed by: PHYSICIAN ASSISTANT

## 2021-02-08 PROCEDURE — G8484 FLU IMMUNIZE NO ADMIN: HCPCS | Performed by: PHYSICIAN ASSISTANT

## 2021-02-08 PROCEDURE — 1123F ACP DISCUSS/DSCN MKR DOCD: CPT | Performed by: PHYSICIAN ASSISTANT

## 2021-02-08 PROCEDURE — G8427 DOCREV CUR MEDS BY ELIG CLIN: HCPCS | Performed by: PHYSICIAN ASSISTANT

## 2021-02-08 PROCEDURE — 51798 US URINE CAPACITY MEASURE: CPT | Performed by: PHYSICIAN ASSISTANT

## 2021-02-08 PROCEDURE — G8417 CALC BMI ABV UP PARAM F/U: HCPCS | Performed by: PHYSICIAN ASSISTANT

## 2021-02-08 PROCEDURE — 99213 OFFICE O/P EST LOW 20 MIN: CPT | Performed by: PHYSICIAN ASSISTANT

## 2021-02-08 ASSESSMENT — ENCOUNTER SYMPTOMS
COLOR CHANGE: 0
WHEEZING: 0
BACK PAIN: 0
COUGH: 0
CONSTIPATION: 0
EYE REDNESS: 0
NAUSEA: 0
SHORTNESS OF BREATH: 0
ABDOMINAL PAIN: 0
VOMITING: 0

## 2021-02-08 NOTE — PROGRESS NOTES
HPI:      Patient is a 80 y.o. male in no acute distress. He is alert and oriented to person, place, and time. History  2015 Difficulty voiding after open heart surgery.  ml after not voiding for 2.5 hours. We started Flomax and added Proscar. Encouraged double voiding for incomplete bladder emptying. PVR 70ml-360ml    10/2019 Increased Flomax to twice per day. Continued Proscar. Prior to his next visit we will check a BMP and renal ultrasound. Did explain to the patient and his son that we are trying to prevent worsening renal function, we will likely not be successful in improving the renal function. Renal function from 7/2019 reviewed: BUN 23, creatinine 2.04, GFR 31. Patient is on aspirin 325 mg daily. He does have an appointment next month with his cardiologist.  I am not sure if it would be beneficial to decrease the dose to 81 mg. We will have the patient back in 4 to 6 weeks to review his renal function, ultrasound and postvoid residual.  We may consider proceeding with cystoscopy, or continued observation. Patient does have a long history of incomplete bladder emptying. 11/2019 Flomax back to once a day     Today:  Patient returns for follow-up of BPH and incomplete bladder emptying. He is accompanied by his son. He denies any fever, chills, dizziness, dysuria, hematuria. Nocturia x3. He does have some urge incontinence. Patient does take MiraLAX daily and he does get a good daily bowel movement with this. He is currently on Flomax daily and Proscar. Patient only drinks water. Patient denies any new or worsening symptoms. Patient was able to void 70 mL and had a postvoid residual of 582 mL. Patient was prompted to void again and was able to void approximately 300 mL. His postvoid residual was 373. At last visit post void residual was 308 mL. Patient did have a BMP at the end of 2/2/2021 his BUN was 34, creatinine 2.16, GFR 29. This is stable compared to prior. Past Medical History:   Diagnosis Date    Acute renal insufficiency 10/5/2015    Anxiety 10/14/2015    ASHD (arteriosclerotic heart disease) 10/14/2015    Bipolar disorder ([facility] [address] 1976    Essential hypertension 9/23/2016    Hyperlipidemia     Hypothyroidism     Localized osteoarthrosis, lower leg 10/14/2015    Parkinson's disease ([facility] [address]     light, 2010     Past Surgical History:   Procedure Laterality Date    CORONARY ARTERY BYPASS GRAFT  08/21/2015     X3 W/ LIMA TO LAD    DIAGNOSTIC CARDIAC CATH LAB PROCEDURE  08/17/15    OTHER SURGICAL HISTORY  08/21/2015    endovascular vein harvesting    OTHER SURGICAL HISTORY  08/21/2015     normothermic cardiopulmonary bypass w/ cardioplegic arrest of the heart    OTHER SURGICAL HISTORY  1994    gallstones removed, Lap     Outpatient Encounter Medications as of 2/8/2021   Medication Sig Dispense Refill    finasteride (PROSCAR) 5 MG tablet TAKE 1 TABLET BY MOUTH ONCE A DAY 90 tablet 3    imiquimod (ALDARA) 5 % cream Apply topically three times a week x 4 weeks 1 box 1    tamsulosin (FLOMAX) 0.4 MG capsule TAKE 1 CAPSULE BY MOUTH DAILY 90 capsule 0    levothyroxine (SYNTHROID) 88 MCG tablet TAKE 1 TABLET BY MOUTH DAILY 90 tablet 0    polyethylene glycol (GLYCOLAX) 17 GM/SCOOP powder Take 17 g by mouth daily      aspirin 81 MG tablet Take 81 mg by mouth daily      Multiple Vitamin (MULTI VITAMIN DAILY PO) Take by mouth daily      vitamin C (ASCORBIC ACID) 500 MG tablet Take 1,000 mg by mouth 2 times daily       buPROPion (WELLBUTRIN SR) 100 MG extended release tablet 100 mg daily       atorvastatin (LIPITOR) 20 MG tablet daily       Omega-3 Fatty Acids (FISH OIL) 1000 MG CAPS Take 1,000 mg by mouth 2 times daily        No facility-administered encounter medications on file as of 2/8/2021.        Current Outpatient Medications on File Prior to Visit   Medication Sig Dispense Refill  imiquimod (ALDARA) 5 % cream Apply topically three times a week x 4 weeks 1 box 1    tamsulosin (FLOMAX) 0.4 MG capsule TAKE 1 CAPSULE BY MOUTH DAILY 90 capsule 0    levothyroxine (SYNTHROID) 88 MCG tablet TAKE 1 TABLET BY MOUTH DAILY 90 tablet 0    polyethylene glycol (GLYCOLAX) 17 GM/SCOOP powder Take 17 g by mouth daily      aspirin 81 MG tablet Take 81 mg by mouth daily      Multiple Vitamin (MULTI VITAMIN DAILY PO) Take by mouth daily      vitamin C (ASCORBIC ACID) 500 MG tablet Take 1,000 mg by mouth 2 times daily       buPROPion (WELLBUTRIN SR) 100 MG extended release tablet 100 mg daily       atorvastatin (LIPITOR) 20 MG tablet daily       Omega-3 Fatty Acids (FISH OIL) 1000 MG CAPS Take 1,000 mg by mouth 2 times daily        No current facility-administered medications on file prior to visit. Patient has no known allergies. Family History   Problem Relation Age of Onset    Depression Mother     Heart Attack Mother     Breast Cancer Sister      Social History     Tobacco Use   Smoking Status Former Smoker    Quit date: 1978    Years since quittin.1   Smokeless Tobacco Never Used       Social History     Substance and Sexual Activity   Alcohol Use No       Review of Systems   Constitutional: Negative for appetite change, chills and fever. Eyes: Negative for redness and visual disturbance. Respiratory: Negative for cough, shortness of breath and wheezing. Cardiovascular: Negative for chest pain and leg swelling. Gastrointestinal: Negative for abdominal pain, constipation, nausea and vomiting. Genitourinary: Negative for decreased urine volume, difficulty urinating, discharge, dysuria, enuresis, flank pain, frequency, hematuria, penile pain, scrotal swelling, testicular pain and urgency. Musculoskeletal: Negative for back pain, joint swelling and myalgias. Skin: Negative for color change, rash and wound. Neurological: Negative for dizziness, tremors and numbness. Hematological: Negative for adenopathy. Does not bruise/bleed easily. /84 (Site: Left Upper Arm, Position: Supine, Cuff Size: Medium Adult)   Pulse 90   Temp 97.7 °F (36.5 °C)   Wt 185 lb (83.9 kg)   BMI 28.98 kg/m²       PHYSICAL EXAM:  Constitutional: Patient in no acute distress; Neuro: alert and oriented to person place and time. Psych: Mood and affect normal.  Skin: Normal  Lungs: Respiratory effort normal  Cardiovascular:  Normal peripheral pulses  Abdomen: Soft, non-tender, non-distended with no CVA, flank pain, hepatosplenomegaly or hernia. Kidneys normal.  Bladder non-tender and not distended. Lymphatics: no palpable lymphadenopathy  Penis normal  Urethral meatus normal  Scrotal exam normal  Testicles normal bilaterally  Epididymis normal bilaterally  No evidence of inguinal hernia  Anus and perineum normal  Normal rectal tone with no masses  Prostate soft, non-tender to palpation. No palpable nodules. Estimated 40 grams. Seminal vesicles not palpable. Lab Results   Component Value Date    BUN 34 (H) 02/02/2021     Lab Results   Component Value Date    CREATININE 2.16 (H) 02/02/2021     Lab Results   Component Value Date    PSA 1.45 11/21/2013       ASSESSMENT:   Diagnosis Orders   1. Benign prostatic hyperplasia with urinary retention  MI MEASUREMENT,POST-VOID RESIDUAL VOLUME BY US,NON-IMAGING   2. Incomplete bladder emptying  MI MEASUREMENT,POST-VOID RESIDUAL VOLUME BY US,NON-IMAGING   3. Other constipation           PLAN:  Patient's initial post void residual was elevated higher than what it has been in the past.  Patient was able to void again and his post void residual was in the mid 300s. This is stable for this patient. We will continue to follow him. 2 L of water a day    Advised him to attempt to void every 2 hours.     Continue MiraLAX 17 g daily    Continue Flomax    Continue Proscar

## 2021-05-07 ENCOUNTER — HOSPITAL ENCOUNTER (OUTPATIENT)
Age: 86
Discharge: HOME | End: 2021-05-07
Payer: MEDICARE

## 2021-05-07 DIAGNOSIS — I10 ESSENTIAL HYPERTENSION: ICD-10-CM

## 2021-05-07 DIAGNOSIS — E78.5 HYPERLIPIDEMIA, UNSPECIFIED HYPERLIPIDEMIA TYPE: ICD-10-CM

## 2021-05-07 DIAGNOSIS — N18.30 CHRONIC RENAL FAILURE, STAGE 3 (MODERATE), UNSPECIFIED WHETHER STAGE 3A OR 3B CKD (HCC): ICD-10-CM

## 2021-05-07 DIAGNOSIS — R73.9 HYPERGLYCEMIA: ICD-10-CM

## 2021-05-07 DIAGNOSIS — I25.10 ASHD (ARTERIOSCLEROTIC HEART DISEASE): ICD-10-CM

## 2021-05-07 LAB
ABSOLUTE EOS #: 0.41 K/UL (ref 0–0.44)
ABSOLUTE IMMATURE GRANULOCYTE: 0.03 K/UL (ref 0–0.3)
ABSOLUTE LYMPH #: 2.07 K/UL (ref 1.1–3.7)
ABSOLUTE MONO #: 0.92 K/UL (ref 0.1–1.2)
ALBUMIN SERPL-MCNC: 3.8 G/DL (ref 3.5–5.2)
ALBUMIN/GLOBULIN RATIO: 1.1 (ref 1–2.5)
ALP BLD-CCNC: 110 U/L (ref 40–129)
ALT SERPL-CCNC: 13 U/L (ref 5–41)
ANION GAP SERPL CALCULATED.3IONS-SCNC: 12 MMOL/L (ref 9–17)
AST SERPL-CCNC: 16 U/L
BASOPHILS # BLD: 1 % (ref 0–2)
BASOPHILS ABSOLUTE: 0.08 K/UL (ref 0–0.2)
BILIRUB SERPL-MCNC: 0.51 MG/DL (ref 0.3–1.2)
BUN BLDV-MCNC: 31 MG/DL (ref 8–23)
BUN/CREAT BLD: 15 (ref 9–20)
CALCIUM SERPL-MCNC: 9.8 MG/DL (ref 8.6–10.4)
CHLORIDE BLD-SCNC: 105 MMOL/L (ref 98–107)
CO2: 23 MMOL/L (ref 20–31)
CREAT SERPL-MCNC: 2.09 MG/DL (ref 0.7–1.2)
DIFFERENTIAL TYPE: ABNORMAL
EOSINOPHILS RELATIVE PERCENT: 5 % (ref 1–4)
FOLATE: >20 NG/ML
GFR AFRICAN AMERICAN: 37 ML/MIN
GFR NON-AFRICAN AMERICAN: 30 ML/MIN
GFR SERPL CREATININE-BSD FRML MDRD: ABNORMAL ML/MIN/{1.73_M2}
GFR SERPL CREATININE-BSD FRML MDRD: ABNORMAL ML/MIN/{1.73_M2}
GLUCOSE BLD-MCNC: 96 MG/DL (ref 70–99)
HCT VFR BLD CALC: 49.3 % (ref 40.7–50.3)
HEMOGLOBIN: 16.1 G/DL (ref 13–17)
IMMATURE GRANULOCYTES: 0 %
LYMPHOCYTES # BLD: 24 % (ref 24–43)
MCH RBC QN AUTO: 33.5 PG (ref 25.2–33.5)
MCHC RBC AUTO-ENTMCNC: 32.7 G/DL (ref 28.4–34.8)
MCV RBC AUTO: 102.7 FL (ref 82.6–102.9)
MONOCYTES # BLD: 11 % (ref 3–12)
NRBC AUTOMATED: 0 PER 100 WBC
PDW BLD-RTO: 13 % (ref 11.8–14.4)
PLATELET # BLD: 233 K/UL (ref 138–453)
PLATELET ESTIMATE: ABNORMAL
PMV BLD AUTO: 10.9 FL (ref 8.1–13.5)
POTASSIUM SERPL-SCNC: 4.5 MMOL/L (ref 3.7–5.3)
RBC # BLD: 4.8 M/UL (ref 4.21–5.77)
RBC # BLD: ABNORMAL 10*6/UL
SEG NEUTROPHILS: 59 % (ref 36–65)
SEGMENTED NEUTROPHILS ABSOLUTE COUNT: 5.14 K/UL (ref 1.5–8.1)
SODIUM BLD-SCNC: 140 MMOL/L (ref 135–144)
TOTAL PROTEIN: 7.4 G/DL (ref 6.4–8.3)
VITAMIN B-12: 467 PG/ML (ref 232–1245)
WBC # BLD: 8.7 K/UL (ref 3.5–11.3)
WBC # BLD: ABNORMAL 10*3/UL

## 2021-05-07 PROCEDURE — 80053 COMPREHEN METABOLIC PANEL: CPT

## 2021-05-07 PROCEDURE — 82746 ASSAY OF FOLIC ACID SERUM: CPT

## 2021-05-07 PROCEDURE — 85025 COMPLETE CBC W/AUTO DIFF WBC: CPT

## 2021-05-07 PROCEDURE — 36415 COLL VENOUS BLD VENIPUNCTURE: CPT

## 2021-05-07 PROCEDURE — 82607 VITAMIN B-12: CPT

## 2021-05-18 ENCOUNTER — OFFICE VISIT (OUTPATIENT)
Dept: FAMILY MEDICINE CLINIC | Age: 86
End: 2021-05-18
Payer: MEDICARE

## 2021-05-18 VITALS
WEIGHT: 183.4 LBS | BODY MASS INDEX: 28.79 KG/M2 | OXYGEN SATURATION: 95 % | HEIGHT: 67 IN | SYSTOLIC BLOOD PRESSURE: 126 MMHG | DIASTOLIC BLOOD PRESSURE: 70 MMHG

## 2021-05-18 DIAGNOSIS — F31.9 BIPOLAR 1 DISORDER (HCC): ICD-10-CM

## 2021-05-18 DIAGNOSIS — I25.10 ASHD (ARTERIOSCLEROTIC HEART DISEASE): ICD-10-CM

## 2021-05-18 DIAGNOSIS — R73.9 HYPERGLYCEMIA: ICD-10-CM

## 2021-05-18 DIAGNOSIS — H61.23 CERUMEN DEBRIS ON TYMPANIC MEMBRANE OF BOTH EARS: ICD-10-CM

## 2021-05-18 DIAGNOSIS — E78.5 HYPERLIPIDEMIA, UNSPECIFIED HYPERLIPIDEMIA TYPE: ICD-10-CM

## 2021-05-18 DIAGNOSIS — E03.9 HYPOTHYROIDISM, UNSPECIFIED TYPE: ICD-10-CM

## 2021-05-18 DIAGNOSIS — I10 ESSENTIAL HYPERTENSION: Primary | ICD-10-CM

## 2021-05-18 PROCEDURE — 99211 OFF/OP EST MAY X REQ PHY/QHP: CPT | Performed by: FAMILY MEDICINE

## 2021-05-18 PROCEDURE — G8427 DOCREV CUR MEDS BY ELIG CLIN: HCPCS | Performed by: FAMILY MEDICINE

## 2021-05-18 PROCEDURE — 1036F TOBACCO NON-USER: CPT | Performed by: FAMILY MEDICINE

## 2021-05-18 PROCEDURE — 1123F ACP DISCUSS/DSCN MKR DOCD: CPT | Performed by: FAMILY MEDICINE

## 2021-05-18 PROCEDURE — 99214 OFFICE O/P EST MOD 30 MIN: CPT | Performed by: FAMILY MEDICINE

## 2021-05-18 PROCEDURE — 4040F PNEUMOC VAC/ADMIN/RCVD: CPT | Performed by: FAMILY MEDICINE

## 2021-05-18 PROCEDURE — G8417 CALC BMI ABV UP PARAM F/U: HCPCS | Performed by: FAMILY MEDICINE

## 2021-05-18 NOTE — PROGRESS NOTES
Documentation Only:    Faxed Prior Authorization form and supporting documentation for [employer] to insurance on 11/08/2018.   I, Kelly Mota [facility], am scribing for and in the presence of Dr. Kaden Gilmore. 5/18/21/3:55/CRF    [address] [address]  [place] [phone]  Dept: [phone]    Kaylan Groves is a 80 y.o. male here for Other (4 month)  pt has no concerns   He recently saw Dr. Idella Kehr for nail trimming , very pleased   Accompanied by son, who would like ears checked for wax    HPI:  ASHD:  Patient presents for follow-up of atherosclerotic coronary artery disease. The patient denies chest pain, SOB, lightheadedness, palpitations and leg edema, he admits to no current symptoms. His most strenuous activity is walking and this never precipitates chest pain.      HYPERLIPIDEMIA   Medication compliance: compliant all of the time. Patient is following a low fat, low cholesterol diet. He is exercising regularly (walking).     HYPERTENSION:  He is not exercising, for n/a hours per week. He is adherent to a low-salt diet.    Blood pressure is not being monitored at home    Prior to Admission medications    Medication Sig Start Date End Date Taking?  Authorizing Provider   tamsulosin (FLOMAX) 0.4 MG capsule TAKE 1 CAPSULE BY MOUTH DAILY 4/12/21  Yes Jabari Recinos PA-C   finasteride (PROSCAR) 5 MG tablet TAKE 1 TABLET BY MOUTH ONCE A DAY 2/8/21  Yes Sukumar Laird APRN - CNP   levothyroxine (SYNTHROID) 88 MCG tablet TAKE 1 TABLET BY MOUTH DAILY 11/20/20  Yes Kaden Gilmore MD   polyethylene glycol (GLYCOLAX) 17 GM/SCOOP powder Take 17 g by mouth daily   Yes Historical Provider, MD   aspirin 81 MG tablet Take 81 mg by mouth daily   Yes Historical Provider, MD   Multiple Vitamin (MULTI VITAMIN DAILY PO) Take by mouth daily   Yes Historical Provider, MD   vitamin C (ASCORBIC ACID) 500 MG tablet Take 1,000 mg by mouth 2 times daily    Yes Historical Provider, MD   buPROPion [facility] SR) 100 MG extended release tablet 100 mg daily  3/26/18  Yes Historical Provider, MD   atorvastatin (LIPITOR) 20 MG tablet daily  9/28/15  Yes Historical Provider, MD   Omega-3 Fatty Acids (FISH OIL) 1000 MG CAPS Take 1,000 mg by mouth 2 times daily    Yes Historical Provider, MD       ROS:  General Constitutional: Denies chills. Denies fever. Denies headache. Denies lightheadedness. Ophthalmologic: Denies blurred vision. ENT: Denies nasal congestion. Denies sore throat. Denies ear pain and pressure. Respiratory: Denies cough. Denies shortness of breath. Denies wheezing. Cardiovascular: Denies chest pain at rest. Denies irregular heartbeat. Denies palpitations. Gastrointestinal: Denies abdominal pain. Denies blood in the stool. Denies constipation. Denies diarrhea. Denies nausea. Denies vomiting. Genitourinary: Denies blood in the urine. Denies difficulty urinating. Denies frequent urination. Denies painful urination. Denies urinary incontinence. Musculoskeletal: Denies muscle aches. Denies painful joints. Denies swollen joints. Peripheral Vascular: Denies pain/cramping in legs after exertion. Skin: Denies dry skin. Denies itching. Denies rash. Neurologic: Denies falls. Denies dizziness. Denies fainting. Denies tingling/numbness. Psychiatric: Denies sleep disturbance. Denies anxiety. Denies depressed mood.     Past Surgical History:   Procedure Laterality Date    CORONARY ARTERY BYPASS GRAFT  08/21/2015     X3 W/ LIMA TO LAD    DIAGNOSTIC CARDIAC CATH LAB PROCEDURE  08/17/15    OTHER SURGICAL HISTORY  08/21/2015    endovascular vein harvesting    OTHER SURGICAL HISTORY  08/21/2015     normothermic cardiopulmonary bypass w/ cardioplegic arrest of the heart    OTHER SURGICAL HISTORY  1994    gallstones removed, Lap       Family History   Problem Relation Age of Onset    Depression Mother     Heart Attack Mother     Breast Cancer Sister        Past Medical History:   Diagnosis Date    Acute renal insufficiency 10/5/2015    Anxiety 10/14/2015    ASHD (arteriosclerotic heart disease) 10/14/2015    Bipolar disorder ([facility] 1976    Essential hypertension 2016    Hyperlipidemia     Hypothyroidism     Localized osteoarthrosis, lower leg 10/14/2015    Parkinson's disease ([facility]     light, 2010      Social History     Tobacco Use    Smoking status: Former Smoker     Quit date: 1978     Years since quittin.4    Smokeless tobacco: Never Used   Substance Use Topics    Alcohol use: No      Current Outpatient Medications   Medication Sig Dispense Refill    tamsulosin (FLOMAX) 0.4 MG capsule TAKE 1 CAPSULE BY MOUTH DAILY 90 capsule 3    finasteride (PROSCAR) 5 MG tablet TAKE 1 TABLET BY MOUTH ONCE A DAY 90 tablet 3    levothyroxine (SYNTHROID) 88 MCG tablet TAKE 1 TABLET BY MOUTH DAILY 90 tablet 0    polyethylene glycol (GLYCOLAX) 17 GM/SCOOP powder Take 17 g by mouth daily      aspirin 81 MG tablet Take 81 mg by mouth daily      Multiple Vitamin (MULTI VITAMIN DAILY PO) Take by mouth daily      vitamin C (ASCORBIC ACID) 500 MG tablet Take 1,000 mg by mouth 2 times daily       buPROPion (WELLBUTRIN SR) 100 MG extended release tablet 100 mg daily       atorvastatin (LIPITOR) 20 MG tablet daily       Omega-3 Fatty Acids (FISH OIL) 1000 MG CAPS Take 1,000 mg by mouth 2 times daily        No current facility-administered medications for this visit. No Known Allergies    PHYSICAL EXAM:    /70   Ht 5' 7\" (1.702 m)   Wt 183 lb 6.4 oz (83.2 kg)   SpO2 95%    L/min Comment: estimated 430  BMI 28.72 kg/m²   Wt Readings from Last 3 Encounters:   21 183 lb 6.4 oz (83.2 kg)   21 185 lb (83.9 kg)   21 184 lb (83.5 kg)     BP Readings from Last 3 Encounters:   21 126/70   21 127/84   21 90/62       General Appearance: in no acute distress, well developed, well nourished. Wearing glasses  Eyes: pupils equal, round reactive to light and accommodation. Ears: normal canal and TM's.  R 90% blocked of cerumen  , L full of cerumen   Nose: nares patent, no Orders Placed This Encounter   Procedures    CBC Auto Differential     Standing Status:   Future     Standing Expiration Date:   5/18/2022    Comprehensive Metabolic Panel     Standing Status:   Future     Standing Expiration Date:   5/18/2022    Folate     Standing Status:   Future     Standing Expiration Date:   5/18/2022    Vitamin B12     Standing Status:   Future     Standing Expiration Date:   5/18/2022    ALT     Standing Status:   Future     Standing Expiration Date:   5/18/2022    AST     Standing Status:   Future     Standing Expiration Date:   5/18/2022    TSH without Reflex     Standing Status:   Future     Standing Expiration Date:   5/18/2022     No orders of the defined types were placed in this encounter. I, Dr. Carina Desai, personally performed the services described in this documentation as scribed by [employer] in my presence, and is both accurate and complete.

## 2021-05-21 RX ORDER — LEVOTHYROXINE SODIUM 88 UG/1
88 TABLET ORAL DAILY
Qty: 90 TABLET | Refills: 3 | Status: SHIPPED | OUTPATIENT
Start: 2021-05-21 | End: 2022-08-15

## 2021-05-24 ENCOUNTER — NURSE ONLY (OUTPATIENT)
Dept: FAMILY MEDICINE CLINIC | Age: 86
End: 2021-05-24
Payer: MEDICARE

## 2021-05-24 DIAGNOSIS — H61.23 IMPACTED CERUMEN OF BOTH EARS: Primary | ICD-10-CM

## 2021-05-24 PROCEDURE — 69210 REMOVE IMPACTED EAR WAX UNI: CPT | Performed by: FAMILY MEDICINE

## 2021-05-26 NOTE — PROGRESS NOTES
Mikel Warren CMA, am scribing for and in the presence of Dr. Stoney Robbins. [address] [address]  [place] [phone]  Dept: [phone]    Ear Cerumen Removal Procedure Note  Indication: ear cerumen impaction    Procedure: After placing the patient's head in the appropriate position, the patient's bilateral ear canal was irrigated with the appropriate solution until all cerumen was removed and the ear canal was clear. The patient tolerated the procedure well. Dr. Stoney Robbins examined the patient's ears post irrigation confirming that the canals are clear bilaterally.

## 2021-06-14 ENCOUNTER — OFFICE VISIT (OUTPATIENT)
Dept: UROLOGY | Age: 86
End: 2021-06-14
Payer: MEDICARE

## 2021-06-14 VITALS
TEMPERATURE: 97.5 F | SYSTOLIC BLOOD PRESSURE: 124 MMHG | DIASTOLIC BLOOD PRESSURE: 82 MMHG | BODY MASS INDEX: 28.82 KG/M2 | WEIGHT: 184 LBS | HEART RATE: 96 BPM

## 2021-06-14 DIAGNOSIS — K59.09 OTHER CONSTIPATION: ICD-10-CM

## 2021-06-14 DIAGNOSIS — R33.9 INCOMPLETE BLADDER EMPTYING: ICD-10-CM

## 2021-06-14 DIAGNOSIS — N18.31 CHRONIC RENAL FAILURE, STAGE 3A (HCC): ICD-10-CM

## 2021-06-14 DIAGNOSIS — N40.1 BENIGN PROSTATIC HYPERPLASIA WITH URINARY RETENTION: Primary | ICD-10-CM

## 2021-06-14 DIAGNOSIS — R33.8 BENIGN PROSTATIC HYPERPLASIA WITH URINARY RETENTION: Primary | ICD-10-CM

## 2021-06-14 PROCEDURE — 1036F TOBACCO NON-USER: CPT | Performed by: UROLOGY

## 2021-06-14 PROCEDURE — G8417 CALC BMI ABV UP PARAM F/U: HCPCS | Performed by: UROLOGY

## 2021-06-14 PROCEDURE — G8427 DOCREV CUR MEDS BY ELIG CLIN: HCPCS | Performed by: UROLOGY

## 2021-06-14 PROCEDURE — 4040F PNEUMOC VAC/ADMIN/RCVD: CPT | Performed by: UROLOGY

## 2021-06-14 PROCEDURE — 51798 US URINE CAPACITY MEASURE: CPT | Performed by: UROLOGY

## 2021-06-14 PROCEDURE — 99213 OFFICE O/P EST LOW 20 MIN: CPT | Performed by: UROLOGY

## 2021-06-14 PROCEDURE — 1123F ACP DISCUSS/DSCN MKR DOCD: CPT | Performed by: UROLOGY

## 2021-06-14 ASSESSMENT — ENCOUNTER SYMPTOMS
COLOR CHANGE: 0
EYE PAIN: 0
BACK PAIN: 0
NAUSEA: 0
WHEEZING: 0
EYE REDNESS: 0
COUGH: 0
ABDOMINAL PAIN: 0
VOMITING: 0
SHORTNESS OF BREATH: 0

## 2021-06-14 NOTE — PATIENT INSTRUCTIONS
SURVEY:    You may be receiving a survey from [employer] regarding your visit today. Please complete the survey to enable us to provide the highest quality of care to you and your family. If you cannot score us a very good on any question, please call the office to discuss how we could have made your experience a very good one. Thank you.   BISI

## 2021-06-14 NOTE — PROGRESS NOTES
HPI:          Patient is a 80 y.o. male in no acute distress. He is alert and oriented to person, place, and time. History  2015 Difficulty voiding after open heart surgery.  ml after not voiding for 2.5 hours. We started Flomax and added Proscar. Encouraged double voiding for incomplete bladder emptying.      PVR 70ml-360ml     10/2019 Increased Flomax to twice per day. Continued Proscar. Prior to his next visit we will check a BMP and renal ultrasound. Did explain to the patient and his son that we are trying to prevent worsening renal function, we will likely not be successful in improving the renal function. Renal function from 7/2019 reviewed: BUN 23, creatinine 2.04, GFR 31. Patient is on aspirin 325 mg daily. He does have an appointment next month with his cardiologist.  I am not sure if it would be beneficial to decrease the dose to 81 mg. We will have the patient back in 4 to 6 weeks to review his renal function, ultrasound and postvoid residual.  We may consider proceeding with cystoscopy, or continued observation. Patient does have a long history of incomplete bladder emptying.     11/2019 Flomax back to once a day       Currently  Patient is here today for 4-month follow-up. He is doing well. He did have a recent BMP performed. His creatinine at this point time was 2.09. This is fairly consistent over the last 18 months. Patient does report no new or worsening lower urinary tract symptoms. He has no gross hematuria or dysuria. He has no pain today. He reports no flank pain nausea vomiting. Patient was able to void 400 mL today. His residual was 290 mL. This is slightly improved. Patient is taking an over-the-counter laxative. This is not MiraLAX. He is having a daily bowel movement. They do feel this is working very well for him. No pain today.     Past Medical History:   Diagnosis Date    Acute renal insufficiency 10/5/2015    Anxiety 10/14/2015    ASHD (arteriosclerotic heart disease) 10/14/2015    Bipolar disorder ([facility] [address] 1976    Essential hypertension 9/23/2016    Hyperlipidemia     Hypothyroidism     Localized osteoarthrosis, lower leg 10/14/2015    Parkinson's disease ([facility] [address]     light, 2010     Past Surgical History:   Procedure Laterality Date    CORONARY ARTERY BYPASS GRAFT  08/21/2015     X3 W/ LIMA TO LAD    DIAGNOSTIC CARDIAC CATH LAB PROCEDURE  08/17/15    OTHER SURGICAL HISTORY  08/21/2015    endovascular vein harvesting    OTHER SURGICAL HISTORY  08/21/2015     normothermic cardiopulmonary bypass w/ cardioplegic arrest of the heart    OTHER SURGICAL HISTORY  1994    gallstones removed, Lap     Outpatient Encounter Medications as of 6/14/2021   Medication Sig Dispense Refill    levothyroxine (SYNTHROID) 88 MCG tablet TAKE 1 TABLET BY MOUTH DAILY 90 tablet 3    tamsulosin (FLOMAX) 0.4 MG capsule TAKE 1 CAPSULE BY MOUTH DAILY 90 capsule 3    finasteride (PROSCAR) 5 MG tablet TAKE 1 TABLET BY MOUTH ONCE A DAY 90 tablet 3    polyethylene glycol (GLYCOLAX) 17 GM/SCOOP powder Take 17 g by mouth daily      aspirin 81 MG tablet Take 81 mg by mouth daily      Multiple Vitamin (MULTI VITAMIN DAILY PO) Take by mouth daily      vitamin C (ASCORBIC ACID) 500 MG tablet Take 1,000 mg by mouth 2 times daily       buPROPion (WELLBUTRIN SR) 100 MG extended release tablet 100 mg daily       atorvastatin (LIPITOR) 20 MG tablet daily       Omega-3 Fatty Acids (FISH OIL) 1000 MG CAPS Take 1,000 mg by mouth 2 times daily        No facility-administered encounter medications on file as of 6/14/2021.       Current Outpatient Medications on File Prior to Visit   Medication Sig Dispense Refill    levothyroxine (SYNTHROID) 88 MCG tablet TAKE 1 TABLET BY MOUTH DAILY 90 tablet 3    tamsulosin (FLOMAX) 0.4 MG capsule TAKE 1 CAPSULE BY MOUTH DAILY 90 capsule 3    finasteride (PROSCAR) 5 MG tablet TAKE 1 TABLET BY MOUTH ONCE A DAY 90 tablet 3    polyethylene glycol (GLYCOLAX) 17 GM/SCOOP powder Take 17 g by mouth daily      aspirin 81 MG tablet Take 81 mg by mouth daily      Multiple Vitamin (MULTI VITAMIN DAILY PO) Take by mouth daily      vitamin C (ASCORBIC ACID) 500 MG tablet Take 1,000 mg by mouth 2 times daily       buPROPion (WELLBUTRIN SR) 100 MG extended release tablet 100 mg daily       atorvastatin (LIPITOR) 20 MG tablet daily       Omega-3 Fatty Acids (FISH OIL) 1000 MG CAPS Take 1,000 mg by mouth 2 times daily        No current facility-administered medications on file prior to visit. Patient has no known allergies. Family History   Problem Relation Age of Onset    Depression Mother     Heart Attack Mother     Breast Cancer Sister      Social History     Tobacco Use   Smoking Status Former Smoker    Quit date: 1978    Years since quittin.4   Smokeless Tobacco Never Used       Social History     Substance and Sexual Activity   Alcohol Use No       Review of Systems   Constitutional: Negative for appetite change, chills and fever. Eyes: Negative for pain, redness and visual disturbance. Respiratory: Negative for cough, shortness of breath and wheezing. Cardiovascular: Negative for chest pain and leg swelling. Gastrointestinal: Negative for abdominal pain, nausea and vomiting. Genitourinary: Negative for difficulty urinating, discharge, dysuria, flank pain, frequency, hematuria, scrotal swelling and testicular pain. Musculoskeletal: Negative for back pain, joint swelling and myalgias. Skin: Negative for color change, rash and wound. Neurological: Negative for dizziness, tremors and numbness. Hematological: Negative for adenopathy. Does not bruise/bleed easily. /82 (Site: Right Upper Arm, Position: Sitting, Cuff Size: Medium Adult)   Pulse 96   Temp 97.5 °F (36.4 °C)   Wt 184 lb (83.5 kg)   BMI 28.82 kg/m²       PHYSICAL EXAM:  Constitutional: Patient in no acute distress;    Neuro: alert and oriented to person place and time. Psych: Mood and affect normal.  Skin: Normal  Lungs: Respiratory effort normal  Cardiovascular:  Normal peripheral pulses  Abdomen: Soft, non-tender, non-distended with no CVA, flank pain  Bladder non-tender and not distended. Lymphatics: no palpable lymphadenopathy  Penis normal  Urethral meatus normal  Scrotal exam normal  Testicles normal bilaterally  Epididymis normal bilaterally  No evidence of inguinal hernia  Anus and perineum normal  Normal rectal tone with no masses  Prostate soft, non-tender to palpation. No palpable nodules. Estimated 40 grams. Seminal vesicles not palpable. Lab Results   Component Value Date    BUN 31 (H) 05/07/2021     Lab Results   Component Value Date    CREATININE 2.09 (H) 05/07/2021     Lab Results   Component Value Date    PSA 1.45 11/21/2013       ASSESSMENT:  This is a 80 y.o. male with the following diagnoses:   Diagnosis Orders   1. Benign prostatic hyperplasia with urinary retention  ND MEASUREMENT,POST-VOID RESIDUAL VOLUME BY US,NON-IMAGING   2. Incomplete bladder emptying  ND MEASUREMENT,POST-VOID RESIDUAL VOLUME BY US,NON-IMAGING   3. Other constipation  ND MEASUREMENT,POST-VOID RESIDUAL VOLUME BY US,NON-IMAGING   4. Chronic renal failure, stage 3a (HCC)  ND MEASUREMENT,POST-VOID RESIDUAL VOLUME BY US,NON-IMAGING       PLAN:  Patient will continue using Flomax and Proscar. He will continue using his supplement to help with bowel movements. He will follow-up with us in 6 months. We can repeat his PVR at that point in time.

## 2021-09-16 ENCOUNTER — HOSPITAL ENCOUNTER (OUTPATIENT)
Age: 86
Discharge: HOME | End: 2021-09-16
Payer: MEDICARE

## 2021-09-16 DIAGNOSIS — R73.9 HYPERGLYCEMIA: ICD-10-CM

## 2021-09-16 DIAGNOSIS — I25.10 ASHD (ARTERIOSCLEROTIC HEART DISEASE): ICD-10-CM

## 2021-09-16 DIAGNOSIS — E78.5 HYPERLIPIDEMIA, UNSPECIFIED HYPERLIPIDEMIA TYPE: ICD-10-CM

## 2021-09-16 DIAGNOSIS — I10 ESSENTIAL HYPERTENSION: ICD-10-CM

## 2021-09-16 DIAGNOSIS — E03.9 HYPOTHYROIDISM, UNSPECIFIED TYPE: ICD-10-CM

## 2021-09-16 LAB
ABSOLUTE EOS #: 0.61 K/UL (ref 0–0.44)
ABSOLUTE IMMATURE GRANULOCYTE: 0.03 K/UL (ref 0–0.3)
ABSOLUTE LYMPH #: 2.32 K/UL (ref 1.1–3.7)
ABSOLUTE MONO #: 0.82 K/UL (ref 0.1–1.2)
ALBUMIN SERPL-MCNC: 3.6 G/DL (ref 3.5–5.2)
ALBUMIN/GLOBULIN RATIO: 1.2 (ref 1–2.5)
ALP BLD-CCNC: 96 U/L (ref 40–129)
ALT SERPL-CCNC: 13 U/L (ref 5–41)
ANION GAP SERPL CALCULATED.3IONS-SCNC: 10 MMOL/L (ref 9–17)
AST SERPL-CCNC: 16 U/L
BASOPHILS # BLD: 1 % (ref 0–2)
BASOPHILS ABSOLUTE: 0.07 K/UL (ref 0–0.2)
BILIRUB SERPL-MCNC: 0.47 MG/DL (ref 0.3–1.2)
BUN BLDV-MCNC: 24 MG/DL (ref 8–23)
BUN/CREAT BLD: 12 (ref 9–20)
CALCIUM SERPL-MCNC: 9.5 MG/DL (ref 8.6–10.4)
CHLORIDE BLD-SCNC: 108 MMOL/L (ref 98–107)
CO2: 24 MMOL/L (ref 20–31)
CREAT SERPL-MCNC: 1.94 MG/DL (ref 0.7–1.2)
DIFFERENTIAL TYPE: ABNORMAL
EOSINOPHILS RELATIVE PERCENT: 8 % (ref 1–4)
GFR AFRICAN AMERICAN: 40 ML/MIN
GFR NON-AFRICAN AMERICAN: 33 ML/MIN
GFR SERPL CREATININE-BSD FRML MDRD: ABNORMAL ML/MIN/{1.73_M2}
GFR SERPL CREATININE-BSD FRML MDRD: ABNORMAL ML/MIN/{1.73_M2}
GLUCOSE BLD-MCNC: 80 MG/DL (ref 70–99)
HCT VFR BLD CALC: 46.5 % (ref 40.7–50.3)
HEMOGLOBIN: 15 G/DL (ref 13–17)
IMMATURE GRANULOCYTES: 0 %
LYMPHOCYTES # BLD: 29 % (ref 24–43)
MCH RBC QN AUTO: 33.2 PG (ref 25.2–33.5)
MCHC RBC AUTO-ENTMCNC: 32.3 G/DL (ref 28.4–34.8)
MCV RBC AUTO: 102.9 FL (ref 82.6–102.9)
MONOCYTES # BLD: 10 % (ref 3–12)
NRBC AUTOMATED: 0 PER 100 WBC
PDW BLD-RTO: 13 % (ref 11.8–14.4)
PLATELET # BLD: 231 K/UL (ref 138–453)
PLATELET ESTIMATE: ABNORMAL
PMV BLD AUTO: 10.6 FL (ref 8.1–13.5)
POTASSIUM SERPL-SCNC: 4.9 MMOL/L (ref 3.7–5.3)
RBC # BLD: 4.52 M/UL (ref 4.21–5.77)
RBC # BLD: ABNORMAL 10*6/UL
SEG NEUTROPHILS: 52 % (ref 36–65)
SEGMENTED NEUTROPHILS ABSOLUTE COUNT: 4.2 K/UL (ref 1.5–8.1)
SODIUM BLD-SCNC: 142 MMOL/L (ref 135–144)
TOTAL PROTEIN: 6.6 G/DL (ref 6.4–8.3)
TSH SERPL DL<=0.05 MIU/L-ACNC: 1.11 MIU/L (ref 0.3–5)
VITAMIN B-12: 473 PG/ML (ref 232–1245)
WBC # BLD: 8.1 K/UL (ref 3.5–11.3)
WBC # BLD: ABNORMAL 10*3/UL

## 2021-09-16 PROCEDURE — 85025 COMPLETE CBC W/AUTO DIFF WBC: CPT

## 2021-09-16 PROCEDURE — 82607 VITAMIN B-12: CPT

## 2021-09-16 PROCEDURE — 82746 ASSAY OF FOLIC ACID SERUM: CPT

## 2021-09-16 PROCEDURE — 36415 COLL VENOUS BLD VENIPUNCTURE: CPT

## 2021-09-16 PROCEDURE — 80053 COMPREHEN METABOLIC PANEL: CPT

## 2021-09-16 PROCEDURE — 84443 ASSAY THYROID STIM HORMONE: CPT

## 2021-09-17 LAB — FOLATE: >20 NG/ML

## 2021-09-21 ENCOUNTER — OFFICE VISIT (OUTPATIENT)
Dept: FAMILY MEDICINE CLINIC | Age: 86
End: 2021-09-21
Payer: MEDICARE

## 2021-09-21 VITALS
SYSTOLIC BLOOD PRESSURE: 132 MMHG | WEIGHT: 185 LBS | OXYGEN SATURATION: 98 % | HEIGHT: 67 IN | DIASTOLIC BLOOD PRESSURE: 64 MMHG | BODY MASS INDEX: 29.03 KG/M2

## 2021-09-21 DIAGNOSIS — F31.9 BIPOLAR 1 DISORDER (HCC): ICD-10-CM

## 2021-09-21 DIAGNOSIS — R73.9 HYPERGLYCEMIA: ICD-10-CM

## 2021-09-21 DIAGNOSIS — E03.9 HYPOTHYROIDISM, UNSPECIFIED TYPE: Primary | ICD-10-CM

## 2021-09-21 DIAGNOSIS — I10 ESSENTIAL HYPERTENSION: ICD-10-CM

## 2021-09-21 DIAGNOSIS — N18.32 CHRONIC RENAL FAILURE, STAGE 3B (HCC): ICD-10-CM

## 2021-09-21 DIAGNOSIS — E78.5 HYPERLIPIDEMIA, UNSPECIFIED HYPERLIPIDEMIA TYPE: ICD-10-CM

## 2021-09-21 DIAGNOSIS — I25.10 ASHD (ARTERIOSCLEROTIC HEART DISEASE): ICD-10-CM

## 2021-09-21 PROCEDURE — 1036F TOBACCO NON-USER: CPT | Performed by: FAMILY MEDICINE

## 2021-09-21 PROCEDURE — 1123F ACP DISCUSS/DSCN MKR DOCD: CPT | Performed by: FAMILY MEDICINE

## 2021-09-21 PROCEDURE — 4040F PNEUMOC VAC/ADMIN/RCVD: CPT | Performed by: FAMILY MEDICINE

## 2021-09-21 PROCEDURE — 99211 OFF/OP EST MAY X REQ PHY/QHP: CPT | Performed by: FAMILY MEDICINE

## 2021-09-21 PROCEDURE — 99214 OFFICE O/P EST MOD 30 MIN: CPT | Performed by: FAMILY MEDICINE

## 2021-09-21 PROCEDURE — G8427 DOCREV CUR MEDS BY ELIG CLIN: HCPCS | Performed by: FAMILY MEDICINE

## 2021-09-21 PROCEDURE — G8417 CALC BMI ABV UP PARAM F/U: HCPCS | Performed by: FAMILY MEDICINE

## 2021-09-21 SDOH — ECONOMIC STABILITY: FOOD INSECURITY: WITHIN THE PAST 12 MONTHS, THE FOOD YOU BOUGHT JUST DIDN'T LAST AND YOU DIDN'T HAVE MONEY TO GET MORE.: NEVER TRUE

## 2021-09-21 SDOH — ECONOMIC STABILITY: FOOD INSECURITY: WITHIN THE PAST 12 MONTHS, YOU WORRIED THAT YOUR FOOD WOULD RUN OUT BEFORE YOU GOT MONEY TO BUY MORE.: NEVER TRUE

## 2021-09-21 ASSESSMENT — SOCIAL DETERMINANTS OF HEALTH (SDOH): HOW HARD IS IT FOR YOU TO PAY FOR THE VERY BASICS LIKE FOOD, HOUSING, MEDICAL CARE, AND HEATING?: NOT HARD AT ALL

## 2021-09-21 NOTE — PATIENT INSTRUCTIONS
SURVEY:    You may be receiving a survey from [employer] regarding your visit today. Please complete the survey to enable us to provide the highest quality of care to you and your family. If you cannot score us a very good on any question, please call the office to discuss how we could of made your experience a very good one. Thank you. PLAN:  I review his labs and am very pleased. I review his medications and will make no changes at this time. We discuss the Covid-19 booster, I would like him to hold off for a few months and I will have more answers when he returns early November for his high dose flu vaccine.     I will see him back in 4 months with labs

## 2021-09-21 NOTE — PROGRESS NOTES
Danielle DE JESUS CMA, am scribing for and in the presence of Dr. Fidel Barthel. [address]  [id number]  Dept: [phone]    Claudetta Later is a 80 y.o. male here for Follow-up, Hyperlipidemia, and Hypertension    -no concerns  Son would like to discuss Booster, check labs and ears   -he follows with Dr. Arik Presley in [facility] [place] for his heart   -pt resides at [address] [place] [address]     HPI:  ASHD:  Patient presents for follow-up of atherosclerotic coronary artery disease. The patient denies chest pain, SOB, lightheadedness, palpitations and leg edema, he admits to no current symptoms. His most strenuous activity is walking and this never precipitates chest pain.      HYPERLIPIDEMIA   Medication compliance: compliant all of the time. Patient is following a low fat, low cholesterol diet. He is exercising regularly (walking).     HYPERTENSION:  He is not exercising, for n/a hours per week. He is adherent to a low-salt diet.    Blood pressure is not being monitored at home    Accompanied by son, Bridgette Galvez    The history listed above was reviewed today and is accurate. Prior to Admission medications    Medication Sig Start Date End Date Taking?  Authorizing Provider   levothyroxine (SYNTHROID) 88 MCG tablet TAKE 1 TABLET BY MOUTH DAILY 5/21/21  Yes Fidel Barthel, MD   tamsulosin (FLOMAX) 0.4 MG capsule TAKE 1 CAPSULE BY MOUTH DAILY 4/12/21  Yes Jabari Recinos PA-C   finasteride (PROSCAR) 5 MG tablet TAKE 1 TABLET BY MOUTH ONCE A DAY 2/8/21  Yes Radha Felipe, APRN - CNP   polyethylene glycol (GLYCOLAX) 17 GM/SCOOP powder Take 17 g by mouth daily   Yes Historical Provider, MD   aspirin 81 MG tablet Take 81 mg by mouth daily   Yes Historical Provider, MD   Multiple Vitamin (MULTI VITAMIN DAILY PO) Take by mouth daily   Yes Historical Provider, MD   vitamin C (ASCORBIC ACID) 500 MG tablet Take 1,000 mg by mouth 2 times daily    Yes Historical Provider, MD buPROPion (WELLBUTRIN SR) 100 MG extended release tablet 100 mg daily  3/26/18  Yes Historical Provider, MD   atorvastatin (LIPITOR) 20 MG tablet daily  9/28/15  Yes Historical Provider, MD   Omega-3 Fatty Acids (FISH OIL) 1000 MG CAPS Take 1,000 mg by mouth 2 times daily    Yes Historical Provider, MD       ROS:  General Constitutional: Denies chills. Denies fever. Denies headache. Denies lightheadedness. Ophthalmologic: Denies blurred vision. ENT: Denies nasal congestion. Denies sore throat. Denies ear pain and pressure. Respiratory: Denies cough. Denies shortness of breath. Denies wheezing. Cardiovascular: Denies chest pain at rest. Denies irregular heartbeat. admits palpitations. Gastrointestinal: Denies abdominal pain. Denies blood in the stool. Denies constipation. Denies diarrhea. Denies nausea. Denies vomiting. Genitourinary: Denies blood in the urine. Denies difficulty urinating. admits frequent urination. Denies painful urination. Denies urinary incontinence. Musculoskeletal: Denies muscle aches. Denies painful joints. Denies swollen joints. Peripheral Vascular: Denies pain/cramping in legs after exertion. Skin: Denies dry skin. Denies itching. Denies rash. Neurologic: Denies falls. Denies dizziness. Denies fainting. Denies tingling/numbness. Psychiatric: Denies sleep disturbance. Denies anxiety. Denies depressed mood.     Past Surgical History:   Procedure Laterality Date    CORONARY ARTERY BYPASS GRAFT  08/21/2015     X3 W/ LIMA TO LAD    DIAGNOSTIC CARDIAC CATH LAB PROCEDURE  08/17/15    OTHER SURGICAL HISTORY  08/21/2015    endovascular vein harvesting    OTHER SURGICAL HISTORY  08/21/2015     normothermic cardiopulmonary bypass w/ cardioplegic arrest of the heart    OTHER SURGICAL HISTORY  1994    gallstones removed, Lap       Family History   Problem Relation Age of Onset    Depression Mother     Heart Attack Mother     Breast Cancer Sister        Past Medical History:   Diagnosis Date    Acute renal insufficiency 10/5/2015    Anxiety 10/14/2015    ASHD (arteriosclerotic heart disease) 10/14/2015    Bipolar disorder ([facility] 1976    Essential hypertension 2016    Hyperlipidemia     Hypothyroidism     Localized osteoarthrosis, lower leg 10/14/2015    Parkinson's disease ([facility]     light, 2010      Social History     Tobacco Use    Smoking status: Former Smoker     Quit date: 1978     Years since quittin.7    Smokeless tobacco: Never Used   Substance Use Topics    Alcohol use: No      Current Outpatient Medications   Medication Sig Dispense Refill    levothyroxine (SYNTHROID) 88 MCG tablet TAKE 1 TABLET BY MOUTH DAILY 90 tablet 3    tamsulosin (FLOMAX) 0.4 MG capsule TAKE 1 CAPSULE BY MOUTH DAILY 90 capsule 3    finasteride (PROSCAR) 5 MG tablet TAKE 1 TABLET BY MOUTH ONCE A DAY 90 tablet 3    polyethylene glycol (GLYCOLAX) 17 GM/SCOOP powder Take 17 g by mouth daily      aspirin 81 MG tablet Take 81 mg by mouth daily      Multiple Vitamin (MULTI VITAMIN DAILY PO) Take by mouth daily      vitamin C (ASCORBIC ACID) 500 MG tablet Take 1,000 mg by mouth 2 times daily       buPROPion (WELLBUTRIN SR) 100 MG extended release tablet 100 mg daily       atorvastatin (LIPITOR) 20 MG tablet daily       Omega-3 Fatty Acids (FISH OIL) 1000 MG CAPS Take 1,000 mg by mouth 2 times daily        No current facility-administered medications for this visit. No Known Allergies    PHYSICAL EXAM:    /64   Ht 5' 7\" (1.702 m)   Wt 185 lb (83.9 kg)   SpO2 98%   BMI 28.98 kg/m²   Wt Readings from Last 3 Encounters:   21 185 lb (83.9 kg)   21 184 lb (83.5 kg)   21 183 lb 6.4 oz (83.2 kg)     BP Readings from Last 3 Encounters:   21 132/64   21 124/82   21 126/70       General Appearance: in no acute distress, well developed, well nourished. Eyes: pupils equal, round reactive to light and accommodation.   Ears: normal canal and TM's. small amount of cerumen L  Nose: nares patent, no lesions. Oral Cavity: mucosa moist.  Throat: clear. Neck/Thyroid: neck supple, full range of motion, no cervical lymphadenopathy, no thyromegaly or carotid bruits. Skin: warm and dry. No suspicious lesions. Heart: regular rate and rhythm. No murmurs. S1, S2 normal, no gallops. Rate 70  Lungs: clear to auscultation bilaterally. crackles  Abdomen: bowel sounds present, soft, nontender, nondistended, no masses or organomegaly. Musculoskeletal: normal, full range of motion in knees and hips, no swelling or tenderness. Extremities: no cyanosis or edema. Peripheral Pulses: 2+ throughout, symetric. Neurologic: nonfocal, motor strength normal upper and lower extremities, sensory exam intact. Psych: normal affect, speech fluent. ASSESSMENT:   Diagnosis Orders   1. Hypothyroidism, unspecified type  T4    TSH without Reflex   2. Hyperlipidemia, unspecified hyperlipidemia type  CBC Auto Differential    Comprehensive Metabolic Panel    Hemoglobin A1C    Vitamin B12    Folate    Lipid Panel    T4    TSH without Reflex   3. Hyperglycemia  CBC Auto Differential    Comprehensive Metabolic Panel    Hemoglobin A1C    Vitamin B12    Folate    Lipid Panel    T4    TSH without Reflex   4. Essential hypertension  CBC Auto Differential    Comprehensive Metabolic Panel    Hemoglobin A1C    Vitamin B12    Folate    Lipid Panel    T4    TSH without Reflex   5. ASHD (arteriosclerotic heart disease)     6. Bipolar 1 disorder ([facility]     7. Chronic renal failure, stage 3b (HCC)         PLAN:  I review his labs and am very pleased. I review his medications and will make no changes at this time. We discuss the Covid-19 booster, I would like him to hold off for a few months and I will have more answers when he returns early November for his high dose flu vaccine.     I will see him back in 4 months with labs        Orders Placed This Encounter   Procedures    CBC Auto Differential Standing Status:   Future     Standing Expiration Date:   9/21/2022    Comprehensive Metabolic Panel     Standing Status:   Future     Standing Expiration Date:   9/21/2022    Hemoglobin A1C     Standing Status:   Future     Standing Expiration Date:   9/21/2022    Vitamin B12     Standing Status:   Future     Standing Expiration Date:   9/21/2022    Folate     Standing Status:   Future     Standing Expiration Date:   9/21/2022    Lipid Panel     Standing Status:   Future     Standing Expiration Date:   9/21/2022     Order Specific Question:   Is Patient Fasting?/# of Hours     Answer:   0    T4     Standing Status:   Future     Standing Expiration Date:   9/21/2022    TSH without Reflex     Standing Status:   Future     Standing Expiration Date:   9/21/2022     No orders of the defined types were placed in this encounter. I, Dr. Maddie Cueto, personally performed the services described in this documentation as scribed/transcribed by ROLAND Hernandez in my presence, and is both accurate and complete.

## 2021-10-06 ENCOUNTER — HOSPITAL ENCOUNTER (OUTPATIENT)
Age: 86
Discharge: HOME | End: 2021-10-08
Payer: MEDICARE

## 2021-10-06 ENCOUNTER — HOSPITAL ENCOUNTER (OUTPATIENT)
Dept: GENERAL RADIOLOGY | Age: 86
Discharge: HOME | End: 2021-10-08
Payer: MEDICARE

## 2021-10-06 ENCOUNTER — OFFICE VISIT (OUTPATIENT)
Dept: FAMILY MEDICINE CLINIC | Age: 86
End: 2021-10-06
Payer: MEDICARE

## 2021-10-06 VITALS
TEMPERATURE: 98.6 F | RESPIRATION RATE: 12 BRPM | DIASTOLIC BLOOD PRESSURE: 70 MMHG | HEIGHT: 67 IN | SYSTOLIC BLOOD PRESSURE: 120 MMHG | BODY MASS INDEX: 28.25 KG/M2 | HEART RATE: 108 BPM | WEIGHT: 180 LBS | OXYGEN SATURATION: 93 %

## 2021-10-06 DIAGNOSIS — R05.9 COUGH: Primary | ICD-10-CM

## 2021-10-06 DIAGNOSIS — R05.9 COUGH: ICD-10-CM

## 2021-10-06 PROCEDURE — G8417 CALC BMI ABV UP PARAM F/U: HCPCS | Performed by: NURSE PRACTITIONER

## 2021-10-06 PROCEDURE — 99213 OFFICE O/P EST LOW 20 MIN: CPT | Performed by: NURSE PRACTITIONER

## 2021-10-06 PROCEDURE — G8427 DOCREV CUR MEDS BY ELIG CLIN: HCPCS | Performed by: NURSE PRACTITIONER

## 2021-10-06 PROCEDURE — 99211 OFF/OP EST MAY X REQ PHY/QHP: CPT

## 2021-10-06 PROCEDURE — 1036F TOBACCO NON-USER: CPT | Performed by: NURSE PRACTITIONER

## 2021-10-06 PROCEDURE — 1123F ACP DISCUSS/DSCN MKR DOCD: CPT | Performed by: NURSE PRACTITIONER

## 2021-10-06 PROCEDURE — 4040F PNEUMOC VAC/ADMIN/RCVD: CPT | Performed by: NURSE PRACTITIONER

## 2021-10-06 PROCEDURE — 71046 X-RAY EXAM CHEST 2 VIEWS: CPT

## 2021-10-06 PROCEDURE — G8484 FLU IMMUNIZE NO ADMIN: HCPCS | Performed by: NURSE PRACTITIONER

## 2021-10-06 RX ORDER — CETIRIZINE HYDROCHLORIDE 10 MG/1
10 TABLET ORAL DAILY
Qty: 30 TABLET | Refills: 2 | Status: SHIPPED | OUTPATIENT
Start: 2021-10-06 | End: 2021-11-05

## 2021-10-06 ASSESSMENT — ENCOUNTER SYMPTOMS
WHEEZING: 0
COUGH: 1
SHORTNESS OF BREATH: 0
RHINORRHEA: 1
ABDOMINAL PAIN: 0
DIARRHEA: 0
VOMITING: 0

## 2021-10-06 NOTE — PROGRESS NOTES
Name: Davey Hernandez  : 1934         Chief Complaint:     Chief Complaint   Patient presents with    Cough     cough 1xday    Pharyngitis     hoarsness. History of Present Illness:      Davey Hernandez is a 80 y.o.  male who presents with Cough (cough 1xday) and Pharyngitis (hoarsness.  )      HPI  The patient presents with cough and hoarseness that began yesterday. Denies sore throat or ear pain. Admits nasal congestion and nasal drainage. Cough is dry with occasional episodes of mucus. Denies abdominal pain, vomiting, or diarrhea. Denies known sick contacts or known covid exposures. Denies loss of taste or smell. He is vaccinated for covid. Denies wheezing or SOB. Denies history of COPD or asthma. He has taken mucinex for his symptoms with benefit. Denies fever or chills. Past Medical History:     Past Medical History:   Diagnosis Date    Acute renal insufficiency 10/5/2015    Anxiety 10/14/2015    ASHD (arteriosclerotic heart disease) 10/14/2015    Bipolar disorder ([facility] 1976    Essential hypertension 2016    Hyperlipidemia     Hypothyroidism     Localized osteoarthrosis, lower leg 10/14/2015    Parkinson's disease ([facility]     light,       Reviewed all health maintenance requirements and ordered appropriate tests  Health Maintenance Due   Topic Date Due    Flu vaccine (1) 2021       Past Surgical History:     Past Surgical History:   Procedure Laterality Date    CORONARY ARTERY BYPASS GRAFT  08/21/2015     X3 W/ LIMA TO LAD    DIAGNOSTIC CARDIAC CATH LAB PROCEDURE  08/17/15    OTHER SURGICAL HISTORY  2015    endovascular vein harvesting    OTHER SURGICAL HISTORY  2015     normothermic cardiopulmonary bypass w/ cardioplegic arrest of the heart    OTHER SURGICAL HISTORY      gallstones removed, Lap        Medications:       Prior to Admission medications    Medication Sig Start Date End Date Taking?  Authorizing Provider   cetirizine (ZYRTEC) 10 MG tablet Take 1 tablet by mouth daily 10/6/21 11/5/21 Yes Sarah Haque, APRN - CNP   levothyroxine (SYNTHROID) 88 MCG tablet TAKE 1 TABLET BY MOUTH DAILY 5/21/21  Yes Radha Miguel MD   tamsulosin (FLOMAX) 0.4 MG capsule TAKE 1 CAPSULE BY MOUTH DAILY 4/12/21  Yes Kristian Recinos PA-C   finasteride (PROSCAR) 5 MG tablet TAKE 1 TABLET BY MOUTH ONCE A DAY 2/8/21  Yes Bailey Ratliff, APRN - CNP   polyethylene glycol (GLYCOLAX) 17 GM/SCOOP powder Take 17 g by mouth daily   Yes Historical Provider, MD   aspirin 81 MG tablet Take 81 mg by mouth daily   Yes Historical Provider, MD   Multiple Vitamin (MULTI VITAMIN DAILY PO) Take by mouth daily   Yes Historical Provider, MD   vitamin C (ASCORBIC ACID) 500 MG tablet Take 1,000 mg by mouth 2 times daily    Yes Historical Provider, MD   buPROPion [facility] SR) 100 MG extended release tablet 100 mg daily  3/26/18  Yes Historical Provider, MD   atorvastatin (LIPITOR) 20 MG tablet daily  9/28/15  Yes Historical Provider, MD   Omega-3 Fatty Acids (FISH OIL) 1000 MG CAPS Take 1,000 mg by mouth 2 times daily    Yes Historical Provider, MD        Allergies:       Patient has no known allergies. Social History:     Tobacco:    reports that he quit smoking about 43 years ago. He has never used smokeless tobacco.  Alcohol:      reports no history of alcohol use. Drug Use:  has no history on file for drug use. Family History:     Family History   Problem Relation Age of Onset    Depression Mother     Heart Attack Mother     Breast Cancer Sister        Review of Systems:     Positive and Negative as described in HPI    Review of Systems   Constitutional: Negative for chills and fever. HENT: Positive for congestion and rhinorrhea. Respiratory: Positive for cough. Negative for shortness of breath and wheezing. Gastrointestinal: Negative for abdominal pain, diarrhea and vomiting.        Physical Exam:   Vitals:  /70   Pulse 108   Temp 98.6 °F (37 °C)   Resp 12   Ht 5' 7\" (1.702 m)   Wt 180 lb (81.6 kg)   SpO2 93%   BMI 28.19 kg/m²     Physical Exam  Constitutional:       General: He is not in acute distress. Appearance: Normal appearance. He is normal weight. He is not ill-appearing or toxic-appearing. HENT:      Head: Normocephalic. Right Ear: Tympanic membrane and external ear normal.      Left Ear: Tympanic membrane and external ear normal.      Nose: Rhinorrhea present. Mouth/Throat:      Mouth: Mucous membranes are moist.      Pharynx: No oropharyngeal exudate or posterior oropharyngeal erythema. Comments: +PND  Eyes:      Conjunctiva/sclera: Conjunctivae normal.   Cardiovascular:      Rate and Rhythm: Regular rhythm. Tachycardia present. Heart sounds: Normal heart sounds. No murmur heard. Pulmonary:      Effort: Pulmonary effort is normal. No respiratory distress. Breath sounds: Wheezing (expiratory) and rhonchi (lower lobes bilaterally) present. No rales. Musculoskeletal:      Cervical back: Neck supple. Lymphadenopathy:      Cervical: No cervical adenopathy. Skin:     General: Skin is warm. Neurological:      Mental Status: He is alert and oriented to person, place, and time. Comments: Unstable gait   Psychiatric:         Mood and Affect: Mood normal.         Behavior: Behavior normal.         Thought Content:  Thought content normal.         Judgment: Judgment normal.         Data:     Lab Results   Component Value Date     09/16/2021    K 4.9 09/16/2021     09/16/2021    CO2 24 09/16/2021    BUN 24 09/16/2021    CREATININE 1.94 09/16/2021    GLUCOSE 80 09/16/2021    PROT 6.6 09/16/2021    LABALBU 3.6 09/16/2021    BILITOT 0.47 09/16/2021    ALKPHOS 96 09/16/2021    AST 16 09/16/2021    ALT 13 09/16/2021     Lab Results   Component Value Date    WBC 8.1 09/16/2021    RBC 4.52 09/16/2021    RBC 5.02 03/31/2012    HGB 15.0 09/16/2021    HCT 46.5 09/16/2021    .9 09/16/2021    MCH 33.2 09/16/2021    MCHC 32.3 09/16/2021    RDW 13.0 09/16/2021     09/16/2021     03/31/2012    MPV 10.6 09/16/2021     Lab Results   Component Value Date    TSH 1.11 09/16/2021     Lab Results   Component Value Date    CHOL 103 02/02/2021    HDL 43 02/02/2021    PSA 1.45 11/21/2013    LABA1C 5.5 07/28/2020       Assessment/Plan:      Diagnosis Orders   1. Cough  XR CHEST STANDARD (2 VW)    COVID-19     SPO2 ranging 93-95% in office today. At most recent office visit 9/21/2021 his SPO2 was 98%. Complete chest x-ray today. Will consider initiating antibiotics based on results. Complete Covid test today  Treat symptoms with Mucinex and cetirizine  His son has a pulse oximeter at home. I encouraged him to check his pulse ox at least twice daily. If less than 90%, present to ED. If less than 95%, notify office or provider on-call. Reviewed emergent signs and symptoms and when to present to the emergency department. Will review results of above tests and follow-up as appropriate. Will consider close follow-up back in office if necessary. Completed Refills   Requested Prescriptions     Signed Prescriptions Disp Refills    cetirizine (ZYRTEC) 10 MG tablet 30 tablet 2     Sig: Take 1 tablet by mouth daily       Orders Placed This Encounter   Procedures    XR CHEST STANDARD (2 VW)     Standing Status:   Future     Number of Occurrences:   1     Standing Expiration Date:   10/6/2022     Order Specific Question:   Reason for exam:     Answer:   cough    COVID-19     Standing Status:   Future     Standing Expiration Date:   10/6/2022     Scheduling Instructions:      1) Due to current limited availability of the COVID-19 test, tests will be prioritized based on responses to questions above. Testing may be delayed due to volume.             2) Print and instruct patient to adhere to CDC home isolation program. (Link Above)              3) Set up or refer patient for a monitoring program.   4) Have patient sign up for and leverage MyChart (if not previously done). Order Specific Question:   Is this test for diagnosis or screening? Answer:   Diagnosis of ill patient     Order Specific Question:   Symptomatic for COVID-19 as defined by CDC? Answer:   Yes     Order Specific Question:   Date of Symptom Onset     Answer:   10/5/2021     Order Specific Question:   Hospitalized for COVID-19? Answer:   No     Order Specific Question:   Admitted to ICU for COVID-19? Answer:   No     Order Specific Question:   Employed in healthcare setting? Answer:   No     Order Specific Question:   Resident in a congregate (group) care setting? Answer:   No     Order Specific Question:   Pregnant: Answer:   No     Order Specific Question:   Previously tested for COVID-19? Answer:   No        No results found for this visit on 10/06/21. Return if symptoms worsen or fail to improve.     Electronically signed by YOLIS Latif CNP on 10/06/21 at 5:07 PM.

## 2021-10-06 NOTE — PATIENT INSTRUCTIONS
Pulse oximeter:   Goal: 97% or higher  If it reads less than 95% notify office or provider on call   If it reads less than 90% go to ED  When you take this, sit down and take deep breaths. SURVEY:    You may be receiving a survey from [employer] regarding your visit today. Please complete the survey to enable us to provide the highest quality of care to you and your family. If you cannot score us a very good on any question, please call the office to discuss how we could of made your experience a very good one. Thank you.

## 2021-10-07 ENCOUNTER — HOSPITAL ENCOUNTER (OUTPATIENT)
Dept: LAB | Age: 86
Setting detail: SPECIMEN
Discharge: HOME | End: 2021-10-07
Payer: MEDICARE

## 2021-10-07 DIAGNOSIS — R05.9 COUGH: ICD-10-CM

## 2021-10-07 PROCEDURE — U0005 INFEC AGEN DETEC AMPLI PROBE: HCPCS

## 2021-10-07 PROCEDURE — U0003 INFECTIOUS AGENT DETECTION BY NUCLEIC ACID (DNA OR RNA); SEVERE ACUTE RESPIRATORY SYNDROME CORONAVIRUS 2 (SARS-COV-2) (CORONAVIRUS DISEASE [COVID-19]), AMPLIFIED PROBE TECHNIQUE, MAKING USE OF HIGH THROUGHPUT TECHNOLOGIES AS DESCRIBED BY CMS-2020-01-R: HCPCS

## 2021-10-07 PROCEDURE — C9803 HOPD COVID-19 SPEC COLLECT: HCPCS

## 2021-10-08 ENCOUNTER — TELEPHONE (OUTPATIENT)
Dept: FAMILY MEDICINE CLINIC | Age: 86
End: 2021-10-08

## 2021-10-08 LAB
SARS-COV-2: NORMAL
SARS-COV-2: NOT DETECTED
SOURCE: NORMAL

## 2021-10-08 NOTE — TELEPHONE ENCOUNTER
Please notify patient these numbers are decreased, but stable. How is he feeling? Cough? Fever? SOB?  Covid test pending

## 2021-10-11 ENCOUNTER — TELEPHONE (OUTPATIENT)
Dept: FAMILY MEDICINE CLINIC | Age: 86
End: 2021-10-11

## 2021-10-11 NOTE — TELEPHONE ENCOUNTER
I would continue both zyrtec and mucinex at this time. I am glad he is feeling better. His O2 readings seem to be lower than his usual baseline.  I would like to see him in office for recheck with Dr. Angel Luis Neville or myself within the next several days, please

## 2021-10-11 NOTE — TELEPHONE ENCOUNTER
Myles called in with O2 readings:    10/9  8:20 am  92%  10/10 10:05 am 93%  10/11 8:30 am 93%    Aura Lantigua states that Ewa Schuler is feeling 'great,' he has minimal cough and is sleeping and eating well. Should he continue taking Zyrtec and Mucinex?

## 2021-10-13 ENCOUNTER — OFFICE VISIT (OUTPATIENT)
Dept: FAMILY MEDICINE CLINIC | Age: 86
End: 2021-10-13
Payer: MEDICARE

## 2021-10-13 VITALS
DIASTOLIC BLOOD PRESSURE: 76 MMHG | HEIGHT: 67 IN | BODY MASS INDEX: 28.72 KG/M2 | WEIGHT: 183 LBS | HEART RATE: 81 BPM | RESPIRATION RATE: 13 BRPM | OXYGEN SATURATION: 93 % | SYSTOLIC BLOOD PRESSURE: 122 MMHG | TEMPERATURE: 98 F

## 2021-10-13 DIAGNOSIS — R05.9 COUGH: Primary | ICD-10-CM

## 2021-10-13 DIAGNOSIS — R09.02 HYPOXIA: ICD-10-CM

## 2021-10-13 DIAGNOSIS — Z87.891 HISTORY OF SMOKING: ICD-10-CM

## 2021-10-13 PROCEDURE — 1123F ACP DISCUSS/DSCN MKR DOCD: CPT | Performed by: NURSE PRACTITIONER

## 2021-10-13 PROCEDURE — G8417 CALC BMI ABV UP PARAM F/U: HCPCS | Performed by: NURSE PRACTITIONER

## 2021-10-13 PROCEDURE — PBSHW PBB SHADOW CHARGE: Performed by: NURSE PRACTITIONER

## 2021-10-13 PROCEDURE — G8427 DOCREV CUR MEDS BY ELIG CLIN: HCPCS | Performed by: NURSE PRACTITIONER

## 2021-10-13 PROCEDURE — PBSHW INFLUENZA, QUADV, ADJUVANTED, 65 YRS +, IM, PF, PREFILL SYR, 0.5ML (FLUAD): Performed by: NURSE PRACTITIONER

## 2021-10-13 PROCEDURE — G8484 FLU IMMUNIZE NO ADMIN: HCPCS | Performed by: NURSE PRACTITIONER

## 2021-10-13 PROCEDURE — 1036F TOBACCO NON-USER: CPT | Performed by: NURSE PRACTITIONER

## 2021-10-13 PROCEDURE — G0008 ADMIN INFLUENZA VIRUS VAC: HCPCS | Performed by: NURSE PRACTITIONER

## 2021-10-13 PROCEDURE — 99213 OFFICE O/P EST LOW 20 MIN: CPT | Performed by: NURSE PRACTITIONER

## 2021-10-13 PROCEDURE — 99211 OFF/OP EST MAY X REQ PHY/QHP: CPT | Performed by: NURSE PRACTITIONER

## 2021-10-13 PROCEDURE — 4040F PNEUMOC VAC/ADMIN/RCVD: CPT | Performed by: NURSE PRACTITIONER

## 2021-10-13 PROCEDURE — 90694 VACC AIIV4 NO PRSRV 0.5ML IM: CPT | Performed by: NURSE PRACTITIONER

## 2021-10-13 RX ORDER — ALBUTEROL SULFATE 90 UG/1
2 AEROSOL, METERED RESPIRATORY (INHALATION) EVERY 4 HOURS PRN
Qty: 18 G | Refills: 2 | Status: SHIPPED | OUTPATIENT
Start: 2021-10-13

## 2021-10-13 ASSESSMENT — ENCOUNTER SYMPTOMS
WHEEZING: 0
SHORTNESS OF BREATH: 0
COUGH: 1

## 2021-10-13 NOTE — PROGRESS NOTES
Name: Griselda Marinas  : 1934         Chief Complaint:     Chief Complaint   Patient presents with    Follow-up     patient comes in for f/u. states they were taking 02 twice daily. ranging 93-96. History of Present Illness:      Griselda Marinas is a 80 y.o.  male who presents with Follow-up (patient comes in for f/u. states they were taking 02 twice daily. ranging 93-96. )      HPI   The patient presents for follow-up. He states that his cough is dry and improving. He denies shortness of breath, wheezing, fever, or chills. He admits a 30-35-year smoking history. His pulse ox at home has been running 93-96%. He continues Zyrtec daily. He is also taking Mucinex twice daily. Overall, he states he is doing well. COVID-19 test 10/7/2021 negative. Chest x-ray 10/6/2021 shows no evidence of acute cardiopulmonary disease. Chest x-ray did show pulmonary sequela typical of that seen with smoking, including possible COPD. Past Medical History:     Past Medical History:   Diagnosis Date    Acute renal insufficiency 10/5/2015    Anxiety 10/14/2015    ASHD (arteriosclerotic heart disease) 10/14/2015    Bipolar disorder ([facility] [address] 1976    Essential hypertension 2016    Hyperlipidemia     Hypothyroidism     Localized osteoarthrosis, lower leg 10/14/2015    Parkinson's disease ([facility] [address]     light,       Reviewed all health maintenance requirements and ordered appropriate tests  There are no preventive care reminders to display for this patient.     Past Surgical History:     Past Surgical History:   Procedure Laterality Date    CORONARY ARTERY BYPASS GRAFT  08/21/2015     X3 W/ LIMA TO LAD    DIAGNOSTIC CARDIAC CATH LAB PROCEDURE  08/17/15    OTHER SURGICAL HISTORY  2015    endovascular vein harvesting    OTHER SURGICAL HISTORY  2015     normothermic cardiopulmonary bypass w/ cardioplegic arrest of the heart    OTHER SURGICAL HISTORY      gallstones removed, Lap Medications:       Prior to Admission medications    Medication Sig Start Date End Date Taking? Authorizing Provider   albuterol sulfate HFA (VENTOLIN HFA) 108 (90 Base) MCG/ACT inhaler Inhale 2 puffs into the lungs every 4 hours as needed for Wheezing or Shortness of Breath 10/13/21  Yes YOLIS Santiago CNP   cetirizine (ZYRTEC) 10 MG tablet Take 1 tablet by mouth daily 10/6/21 11/5/21 Yes YOLIS Santiago CNP   levothyroxine (SYNTHROID) 88 MCG tablet TAKE 1 TABLET BY MOUTH DAILY 5/21/21  Yes Sergio Hopper MD   tamsulosin (FLOMAX) 0.4 MG capsule TAKE 1 CAPSULE BY MOUTH DAILY 4/12/21  Yes Marguerite Recinos PA-C   finasteride (PROSCAR) 5 MG tablet TAKE 1 TABLET BY MOUTH ONCE A DAY 2/8/21  Yes Ruben SluderYOLIS - CNP   polyethylene glycol (GLYCOLAX) 17 GM/SCOOP powder Take 17 g by mouth daily   Yes Historical Provider, MD   aspirin 81 MG tablet Take 81 mg by mouth daily   Yes Historical Provider, MD   Multiple Vitamin (MULTI VITAMIN DAILY PO) Take by mouth daily   Yes Historical Provider, MD   vitamin C (ASCORBIC ACID) 500 MG tablet Take 1,000 mg by mouth 2 times daily    Yes Historical Provider, MD   buPROPion (WELLBUTRIN SR) 100 MG extended release tablet 100 mg daily  3/26/18  Yes Historical Provider, MD   atorvastatin (LIPITOR) 20 MG tablet daily  9/28/15  Yes Historical Provider, MD   Omega-3 Fatty Acids (FISH OIL) 1000 MG CAPS Take 1,000 mg by mouth 2 times daily    Yes Historical Provider, MD        Allergies:       Patient has no known allergies. Social History:     Tobacco:    reports that he quit smoking about 43 years ago. He has never used smokeless tobacco.  Alcohol:      reports no history of alcohol use. Drug Use:  has no history on file for drug use.     Family History:     Family History   Problem Relation Age of Onset    Depression Mother     Heart Attack Mother     Breast Cancer Sister        Review of Systems:     Positive and Negative as described in HPI    Review of Systems   Constitutional: Negative for chills and fever. Respiratory: Positive for cough (improving). Negative for shortness of breath and wheezing. Physical Exam:   Vitals:  /76   Pulse 81   Temp 98 °F (36.7 °C)   Resp 13   Ht 5' 7\" (1.702 m)   Wt 183 lb (83 kg)   SpO2 93%   BMI 28.66 kg/m²     Physical Exam  Constitutional:       General: He is not in acute distress. Appearance: Normal appearance. He is normal weight. He is not ill-appearing or toxic-appearing. HENT:      Head: Normocephalic. Cardiovascular:      Rate and Rhythm: Normal rate and regular rhythm. Heart sounds: Normal heart sounds. No murmur heard. Pulmonary:      Effort: Pulmonary effort is normal. No respiratory distress. Breath sounds: Normal breath sounds. No stridor. No wheezing, rhonchi or rales. Skin:     General: Skin is warm. Neurological:      Mental Status: He is alert and oriented to person, place, and time. Psychiatric:         Mood and Affect: Mood normal.         Behavior: Behavior normal.         Thought Content:  Thought content normal.         Judgment: Judgment normal.         Data:     Lab Results   Component Value Date     09/16/2021    K 4.9 09/16/2021     09/16/2021    CO2 24 09/16/2021    BUN 24 09/16/2021    CREATININE 1.94 09/16/2021    GLUCOSE 80 09/16/2021    PROT 6.6 09/16/2021    LABALBU 3.6 09/16/2021    BILITOT 0.47 09/16/2021    ALKPHOS 96 09/16/2021    AST 16 09/16/2021    ALT 13 09/16/2021     Lab Results   Component Value Date    WBC 8.1 09/16/2021    RBC 4.52 09/16/2021    RBC 5.02 03/31/2012    HGB 15.0 09/16/2021    HCT 46.5 09/16/2021    .9 09/16/2021    MCH 33.2 09/16/2021    MCHC 32.3 09/16/2021    RDW 13.0 09/16/2021     09/16/2021     03/31/2012    MPV 10.6 09/16/2021     Lab Results   Component Value Date    TSH 1.11 09/16/2021     Lab Results   Component Value Date    CHOL 103 02/02/2021    HDL 43 02/02/2021    PSA 1.45 11/21/2013    LABA1C 5.5 07/28/2020       Assessment/Plan:      Diagnosis Orders   1. Cough  Spirometry With Bronchodilator   2. History of smoking  Spirometry With Bronchodilator   3. Hypoxia  Spirometry With Bronchodilator     Continue Zyrtec  Discontinue Mucinex  Rx albuterol every 4-6 hours as needed  SPO2 in office 93%. Compared with home pulse oximeter device, at-home device accurate. Given chest x-ray results as noted above as well as O2 readings, will evaluate further with PFT. We will call patient with results and update treatment plan as appropriate  Reviewed worsening signs and symptoms of cough, shortness of breath, wheezing and when to notify office or seek immediate care  Influenza vaccine given in office today    Completed Refills   Requested Prescriptions     Signed Prescriptions Disp Refills    albuterol sulfate HFA (VENTOLIN HFA) 108 (90 Base) MCG/ACT inhaler 18 g 2     Sig: Inhale 2 puffs into the lungs every 4 hours as needed for Wheezing or Shortness of Breath       Orders Placed This Encounter   Procedures    INFLUENZA, QUADV, ADJUVANTED, 72 YRS =, IM, PF, PREFILL SYR, 0.5ML (FLUAD)    Spirometry With Bronchodilator     Standing Status:   Future     Standing Expiration Date:   10/13/2022        No results found for this visit on 10/13/21. Return if symptoms worsen or fail to improve.     Electronically signed by YOLIS Murillo CNP on 10/13/21 at 11:55 AM.

## 2021-10-26 ENCOUNTER — HOSPITAL ENCOUNTER (OUTPATIENT)
Dept: PULMONOLOGY | Age: 86
Discharge: HOME | End: 2021-10-26
Payer: MEDICARE

## 2021-10-26 DIAGNOSIS — R09.02 HYPOXIA: ICD-10-CM

## 2021-10-26 DIAGNOSIS — R05.9 COUGH: ICD-10-CM

## 2021-10-26 DIAGNOSIS — Z87.891 HISTORY OF SMOKING: ICD-10-CM

## 2021-10-26 PROCEDURE — 94729 DIFFUSING CAPACITY: CPT

## 2021-10-26 PROCEDURE — 94664 DEMO&/EVAL PT USE INHALER: CPT

## 2021-10-26 PROCEDURE — 94726 PLETHYSMOGRAPHY LUNG VOLUMES: CPT

## 2021-10-26 PROCEDURE — 94060 EVALUATION OF WHEEZING: CPT

## 2021-10-26 PROCEDURE — 6370000000 HC RX 637 (ALT 250 FOR IP): Performed by: INTERNAL MEDICINE

## 2021-10-26 RX ORDER — ALBUTEROL SULFATE 90 UG/1
4 AEROSOL, METERED RESPIRATORY (INHALATION) ONCE
Status: COMPLETED | OUTPATIENT
Start: 2021-10-26 | End: 2021-10-26

## 2021-10-26 RX ADMIN — ALBUTEROL SULFATE 4 PUFF: 90 AEROSOL, METERED RESPIRATORY (INHALATION) at 14:18

## 2021-10-28 NOTE — PROCEDURES
[facility]                               PULMONARY FUNCTION    PATIENT NAME: Danny Gunter                   :        1934  MED REC NO:   [id number]                              ROOM:  ACCOUNT NO:   [de-identified]                           ADMIT DATE: 10/26/2021  PROVIDER:     Moose Hernandez    DATE OF PROCEDURE:  10/26/2021    INTERPRETATION:  Spirometry shows FVC is 2.50, 81% of predicted. FEV1  is 2.08, 100% of predicted, which is within normal limits. FEV1/FVC  ratio is normal without evidence of obstruction. Postbronchodilator,  there is no significant improvement in FEV1 or FVC to suggest  bronchospasticity or airway reversibility. Lung volume shows residual volume is 2.33, 90% of predicted. Total lung  capacity is 5.17, 82% of predicted, which is within normal limits. Diffusion capacity was not available to report. IMPRESSION:  This pulmonary function test shows normal spirometry  without evidence of obstruction. No significant response to  bronchodilator, but clinical response is possible. Lung volumes are  normal.  There is no diffusion capacity performed during the study. Clinical correlation is recommended. Maria Eugenia Patel    D: 10/27/2021 21:30:38       T: 10/27/2021 21:33:08     [email or web address]  Job#: [id number]     Doc#: [id number]    CC:   HUANG Xiong

## 2021-11-05 ENCOUNTER — TELEPHONE (OUTPATIENT)
Dept: FAMILY MEDICINE CLINIC | Age: 86
End: 2021-11-05

## 2021-11-05 NOTE — TELEPHONE ENCOUNTER
Please notify patient/family that these numbers are improved from when he was in office. No changes or interventions required at this time. Call office with any concerns. No need to continue monitoring pulse ox at home.

## 2021-11-05 NOTE — TELEPHONE ENCOUNTER
----- Message from Marnie Hernández sent at 11/4/2021  3:41 PM EDT -----  Subject: Message to Provider    QUESTIONS  Information for Provider? Sarwat Davis is calling with Oxygen Readings: oxygen   reading off tip meter twice daily (9 am & 3? 30pm) 11/2/21 9a 95 3:30pm 94   No inhaler 11/3/21 9am 95 3:30pm 96 (used inhaler during test) 11/4/21 9am   95 3:30pm 96 no inhaler   ---------------------------------------------------------------------------  --------------  CALL BACK INFO  What is the best way for the office to contact you? OK to leave message on   voicemail  Preferred Call Back Phone Number? [phone]  ---------------------------------------------------------------------------  --------------  SCRIPT ANSWERS  Relationship to Patient? Third Party  Representative Name?  Moy Fam

## 2021-12-20 ENCOUNTER — OFFICE VISIT (OUTPATIENT)
Dept: UROLOGY | Age: 86
End: 2021-12-20
Payer: MEDICARE

## 2021-12-20 VITALS
BODY MASS INDEX: 28.35 KG/M2 | SYSTOLIC BLOOD PRESSURE: 120 MMHG | HEART RATE: 68 BPM | DIASTOLIC BLOOD PRESSURE: 76 MMHG | WEIGHT: 181 LBS

## 2021-12-20 DIAGNOSIS — N18.31 CHRONIC RENAL FAILURE, STAGE 3A (HCC): ICD-10-CM

## 2021-12-20 DIAGNOSIS — R33.8 BENIGN PROSTATIC HYPERPLASIA WITH URINARY RETENTION: Primary | ICD-10-CM

## 2021-12-20 DIAGNOSIS — R33.9 INCOMPLETE BLADDER EMPTYING: ICD-10-CM

## 2021-12-20 DIAGNOSIS — N40.1 BENIGN PROSTATIC HYPERPLASIA WITH URINARY RETENTION: Primary | ICD-10-CM

## 2021-12-20 PROCEDURE — PBSHW PR MEASUREMENT,POST-VOID RESIDUAL VOLUME BY US,NON-IMAGING: Performed by: UROLOGY

## 2021-12-20 PROCEDURE — 1123F ACP DISCUSS/DSCN MKR DOCD: CPT | Performed by: UROLOGY

## 2021-12-20 PROCEDURE — 1036F TOBACCO NON-USER: CPT | Performed by: UROLOGY

## 2021-12-20 PROCEDURE — G8427 DOCREV CUR MEDS BY ELIG CLIN: HCPCS | Performed by: UROLOGY

## 2021-12-20 PROCEDURE — 51798 US URINE CAPACITY MEASURE: CPT | Performed by: UROLOGY

## 2021-12-20 PROCEDURE — 4040F PNEUMOC VAC/ADMIN/RCVD: CPT | Performed by: UROLOGY

## 2021-12-20 PROCEDURE — 99213 OFFICE O/P EST LOW 20 MIN: CPT | Performed by: UROLOGY

## 2021-12-20 PROCEDURE — G8484 FLU IMMUNIZE NO ADMIN: HCPCS | Performed by: UROLOGY

## 2021-12-20 PROCEDURE — G8417 CALC BMI ABV UP PARAM F/U: HCPCS | Performed by: UROLOGY

## 2021-12-20 ASSESSMENT — ENCOUNTER SYMPTOMS
VOMITING: 0
WHEEZING: 0
ABDOMINAL PAIN: 0
EYE REDNESS: 0
COLOR CHANGE: 0
BACK PAIN: 0
CONSTIPATION: 0
COUGH: 0
NAUSEA: 0
SHORTNESS OF BREATH: 0

## 2021-12-20 NOTE — PROGRESS NOTES
HPI:          Patient is a 80 y.o. male in no acute distress. He is alert and oriented to person, place, and time. History  2015 Difficulty voiding after open heart surgery.  ml after not voiding for 2.5 hours. We started Flomax and added Proscar. Encouraged double voiding for incomplete bladder emptying.      PVR 70ml-360ml     10/2019 Increased Flomax to twice per day.  Continued Basil Rung to his next visit we will check a BMP and renal ultrasound.  Did explain to the patient and his son that we are trying to prevent worsening renal function, we will likely not be successful in improving the renal function.  Renal function from 7/2019 reviewed: BUN 23, creatinine 2.04, GFR 31. Patient is on aspirin 325 mg daily. Jessy Milan does have an appointment next month with his cardiologist. Kee Carias am not sure if it would be beneficial to decrease the dose to 81 mg.  We will have the patient back in 4 to 6 weeks to review his renal function, ultrasound and postvoid residual.  We may consider proceeding with cystoscopy, or continued observation. Arron Meek does have a long history of incomplete bladder emptying.     11/2019 Flomax back to once a day         Currently  Patient is here today for 6-month follow-up. We have been watching his creatinine. Patient's creatinine was 2.09 the last visit. Patient was unable to void here in our office today. He did have a random bladder scan for 489 mL. Patient's last lab work did show creatinine 1.94. This was approximately 3 months ago. Patient is having no new or worsening symptoms. No recent gross hematuria dysuria. No flank pain nausea vomiting. Patient has no urinary incontinence. No pain today.   Past Medical History:   Diagnosis Date    Acute renal insufficiency 10/5/2015    Anxiety 10/14/2015    ASHD (arteriosclerotic heart disease) 10/14/2015    Bipolar disorder ([facility] [address] 1976    Essential hypertension 9/23/2016    Hyperlipidemia     Hypothyroidism  Localized osteoarthrosis, lower leg 10/14/2015    Parkinson's disease ([facility] [address]     light, 2010     Past Surgical History:   Procedure Laterality Date    CORONARY ARTERY BYPASS GRAFT  08/21/2015     X3 W/ LIMA TO LAD    DIAGNOSTIC CARDIAC CATH LAB PROCEDURE  08/17/15    OTHER SURGICAL HISTORY  08/21/2015    endovascular vein harvesting    OTHER SURGICAL HISTORY  08/21/2015     normothermic cardiopulmonary bypass w/ cardioplegic arrest of the heart    OTHER SURGICAL HISTORY  1994    gallstones removed, Lap     Outpatient Encounter Medications as of 12/20/2021   Medication Sig Dispense Refill    albuterol sulfate HFA (VENTOLIN HFA) 108 (90 Base) MCG/ACT inhaler Inhale 2 puffs into the lungs every 4 hours as needed for Wheezing or Shortness of Breath 18 g 2    levothyroxine (SYNTHROID) 88 MCG tablet TAKE 1 TABLET BY MOUTH DAILY 90 tablet 3    tamsulosin (FLOMAX) 0.4 MG capsule TAKE 1 CAPSULE BY MOUTH DAILY 90 capsule 3    finasteride (PROSCAR) 5 MG tablet TAKE 1 TABLET BY MOUTH ONCE A DAY 90 tablet 3    polyethylene glycol (GLYCOLAX) 17 GM/SCOOP powder Take 17 g by mouth daily      aspirin 81 MG tablet Take 81 mg by mouth daily      Multiple Vitamin (MULTI VITAMIN DAILY PO) Take by mouth daily      vitamin C (ASCORBIC ACID) 500 MG tablet Take 1,000 mg by mouth 2 times daily       buPROPion (WELLBUTRIN SR) 100 MG extended release tablet 100 mg daily       atorvastatin (LIPITOR) 20 MG tablet daily       Omega-3 Fatty Acids (FISH OIL) 1000 MG CAPS Take 1,000 mg by mouth 2 times daily        No facility-administered encounter medications on file as of 12/20/2021.       Current Outpatient Medications on File Prior to Visit   Medication Sig Dispense Refill    albuterol sulfate HFA (VENTOLIN HFA) 108 (90 Base) MCG/ACT inhaler Inhale 2 puffs into the lungs every 4 hours as needed for Wheezing or Shortness of Breath 18 g 2    levothyroxine (SYNTHROID) 88 MCG tablet TAKE 1 TABLET BY MOUTH DAILY 90 tablet 3    tamsulosin (FLOMAX) 0.4 MG capsule TAKE 1 CAPSULE BY MOUTH DAILY 90 capsule 3    finasteride (PROSCAR) 5 MG tablet TAKE 1 TABLET BY MOUTH ONCE A DAY 90 tablet 3    polyethylene glycol (GLYCOLAX) 17 GM/SCOOP powder Take 17 g by mouth daily      aspirin 81 MG tablet Take 81 mg by mouth daily      Multiple Vitamin (MULTI VITAMIN DAILY PO) Take by mouth daily      vitamin C (ASCORBIC ACID) 500 MG tablet Take 1,000 mg by mouth 2 times daily       buPROPion (WELLBUTRIN SR) 100 MG extended release tablet 100 mg daily       atorvastatin (LIPITOR) 20 MG tablet daily       Omega-3 Fatty Acids (FISH OIL) 1000 MG CAPS Take 1,000 mg by mouth 2 times daily        No current facility-administered medications on file prior to visit. Patient has no known allergies. Family History   Problem Relation Age of Onset    Depression Mother     Heart Attack Mother     Breast Cancer Sister      Social History     Tobacco Use   Smoking Status Former Smoker    Quit date: 1978    Years since quittin.9   Smokeless Tobacco Never Used       Social History     Substance and Sexual Activity   Alcohol Use No       Review of Systems   Constitutional: Negative for appetite change, chills and fever. Eyes: Negative for redness and visual disturbance. Respiratory: Negative for cough, shortness of breath and wheezing. Cardiovascular: Negative for chest pain and leg swelling. Gastrointestinal: Negative for abdominal pain, constipation, nausea and vomiting. Genitourinary: Negative for decreased urine volume, difficulty urinating, dysuria, enuresis, flank pain, frequency, hematuria, penile discharge, penile pain, scrotal swelling, testicular pain and urgency. Musculoskeletal: Negative for back pain, joint swelling and myalgias. Skin: Negative for color change, rash and wound. Neurological: Negative for dizziness, tremors and numbness. Hematological: Negative for adenopathy. Does not bruise/bleed easily.        BP 120/76 (Site: Right Upper Arm, Position: Sitting, Cuff Size: Medium Adult)   Pulse 68   Wt 181 lb (82.1 kg)   BMI 28.35 kg/m²       PHYSICAL EXAM:  Constitutional: Patient in no acute distress; Neuro: alert and oriented to person place and time. Psych: Mood and affect normal.  Skin: Normal  Lungs: Respiratory effort normal  Cardiovascular:  Normal peripheral pulses  Abdomen: Soft, non-tender, non-distended with no CVA, flank pain  Bladder non-tender and not distended. Lymphatics: no palpable lymphadenopathy  Penis normal  Urethral meatus normal  Scrotal exam normal  Testicles normal bilaterally  Epididymis normal bilaterally  No evidence of inguinal hernia  Anus and perineum normal  Normal rectal tone with no masses  Prostate soft, non-tender to palpation. No palpable nodules. Estimated 40 grams. Seminal vesicles not palpable. Lab Results   Component Value Date    BUN 24 (H) 09/16/2021     Lab Results   Component Value Date    CREATININE 1.94 (H) 09/16/2021     Lab Results   Component Value Date    PSA 1.45 11/21/2013       ASSESSMENT:  This is a 80 y.o. male with the following diagnoses:   Diagnosis Orders   1. Benign prostatic hyperplasia with urinary retention     2. Incomplete bladder emptying     3. Chronic renal failure, stage 3a (HCC)         PLAN:  Patient is due repeat lab work next month with primary care. We can go over the results of this. We can then make a decision on whether or not to proceed with anything further. I do think one minor change we can make is to increase the Flomax to twice daily.   We would then need to repeat the postvoid residual.

## 2022-01-20 ENCOUNTER — HOSPITAL ENCOUNTER (OUTPATIENT)
Age: 87
Discharge: HOME | End: 2022-01-20
Payer: MEDICARE

## 2022-01-20 DIAGNOSIS — E03.9 HYPOTHYROIDISM, UNSPECIFIED TYPE: ICD-10-CM

## 2022-01-20 DIAGNOSIS — R73.9 HYPERGLYCEMIA: ICD-10-CM

## 2022-01-20 DIAGNOSIS — I10 ESSENTIAL HYPERTENSION: ICD-10-CM

## 2022-01-20 DIAGNOSIS — E78.5 HYPERLIPIDEMIA, UNSPECIFIED HYPERLIPIDEMIA TYPE: ICD-10-CM

## 2022-01-20 LAB
ABSOLUTE EOS #: 0.76 K/UL (ref 0–0.44)
ABSOLUTE IMMATURE GRANULOCYTE: <0.03 K/UL (ref 0–0.3)
ABSOLUTE LYMPH #: 2.03 K/UL (ref 1.1–3.7)
ABSOLUTE MONO #: 0.7 K/UL (ref 0.1–1.2)
ALBUMIN SERPL-MCNC: 3.9 G/DL (ref 3.5–5.2)
ALBUMIN/GLOBULIN RATIO: 1.4 (ref 1–2.5)
ALP BLD-CCNC: 113 U/L (ref 40–129)
ALT SERPL-CCNC: 16 U/L (ref 5–41)
ANION GAP SERPL CALCULATED.3IONS-SCNC: 11 MMOL/L (ref 9–17)
AST SERPL-CCNC: 22 U/L
BASOPHILS # BLD: 1 % (ref 0–2)
BASOPHILS ABSOLUTE: 0.06 K/UL (ref 0–0.2)
BILIRUB SERPL-MCNC: 0.41 MG/DL (ref 0.3–1.2)
BUN BLDV-MCNC: 30 MG/DL (ref 8–23)
BUN/CREAT BLD: 15 (ref 9–20)
CALCIUM SERPL-MCNC: 9.9 MG/DL (ref 8.6–10.4)
CHLORIDE BLD-SCNC: 104 MMOL/L (ref 98–107)
CO2: 24 MMOL/L (ref 20–31)
CREAT SERPL-MCNC: 2.01 MG/DL (ref 0.7–1.2)
DIFFERENTIAL TYPE: ABNORMAL
EOSINOPHILS RELATIVE PERCENT: 10 % (ref 1–4)
GFR AFRICAN AMERICAN: 38 ML/MIN
GFR NON-AFRICAN AMERICAN: 32 ML/MIN
GFR SERPL CREATININE-BSD FRML MDRD: ABNORMAL ML/MIN/{1.73_M2}
GFR SERPL CREATININE-BSD FRML MDRD: ABNORMAL ML/MIN/{1.73_M2}
GLUCOSE BLD-MCNC: 119 MG/DL (ref 70–99)
HCT VFR BLD CALC: 46.3 % (ref 40.7–50.3)
HEMOGLOBIN: 15.3 G/DL (ref 13–17)
IMMATURE GRANULOCYTES: 0 %
LYMPHOCYTES # BLD: 27 % (ref 24–43)
MCH RBC QN AUTO: 33.9 PG (ref 25.2–33.5)
MCHC RBC AUTO-ENTMCNC: 33 G/DL (ref 28.4–34.8)
MCV RBC AUTO: 102.7 FL (ref 82.6–102.9)
MONOCYTES # BLD: 9 % (ref 3–12)
NRBC AUTOMATED: 0 PER 100 WBC
PDW BLD-RTO: 12.5 % (ref 11.8–14.4)
PLATELET # BLD: 215 K/UL (ref 138–453)
PLATELET ESTIMATE: ABNORMAL
PMV BLD AUTO: 10.6 FL (ref 8.1–13.5)
POTASSIUM SERPL-SCNC: 5.2 MMOL/L (ref 3.7–5.3)
RBC # BLD: 4.51 M/UL (ref 4.21–5.77)
RBC # BLD: ABNORMAL 10*6/UL
SEG NEUTROPHILS: 53 % (ref 36–65)
SEGMENTED NEUTROPHILS ABSOLUTE COUNT: 3.87 K/UL (ref 1.5–8.1)
SODIUM BLD-SCNC: 139 MMOL/L (ref 135–144)
TOTAL PROTEIN: 6.7 G/DL (ref 6.4–8.3)
TSH SERPL DL<=0.05 MIU/L-ACNC: 1.69 MIU/L (ref 0.3–5)
WBC # BLD: 7.4 K/UL (ref 3.5–11.3)
WBC # BLD: ABNORMAL 10*3/UL

## 2022-01-20 PROCEDURE — 84436 ASSAY OF TOTAL THYROXINE: CPT

## 2022-01-20 PROCEDURE — 36415 COLL VENOUS BLD VENIPUNCTURE: CPT

## 2022-01-20 PROCEDURE — 84443 ASSAY THYROID STIM HORMONE: CPT

## 2022-01-20 PROCEDURE — 85025 COMPLETE CBC W/AUTO DIFF WBC: CPT

## 2022-01-20 PROCEDURE — 80053 COMPREHEN METABOLIC PANEL: CPT

## 2022-01-20 PROCEDURE — 82607 VITAMIN B-12: CPT

## 2022-01-20 PROCEDURE — 80061 LIPID PANEL: CPT

## 2022-01-20 PROCEDURE — 83036 HEMOGLOBIN GLYCOSYLATED A1C: CPT

## 2022-01-20 PROCEDURE — 82746 ASSAY OF FOLIC ACID SERUM: CPT

## 2022-01-21 LAB
CHOLESTEROL/HDL RATIO: 3.6
CHOLESTEROL: 125 MG/DL
ESTIMATED AVERAGE GLUCOSE: 117 MG/DL
FOLATE: >20 NG/ML
HBA1C MFR BLD: 5.7 % (ref 4–6)
HDLC SERPL-MCNC: 35 MG/DL
LDL CHOLESTEROL: 61 MG/DL (ref 0–130)
T4 TOTAL: 6.6 UG/DL (ref 4.5–10.9)
TRIGL SERPL-MCNC: 143 MG/DL
VITAMIN B-12: 473 PG/ML (ref 232–1245)
VLDLC SERPL CALC-MCNC: ABNORMAL MG/DL (ref 1–30)

## 2022-01-25 ENCOUNTER — OFFICE VISIT (OUTPATIENT)
Dept: FAMILY MEDICINE CLINIC | Age: 87
End: 2022-01-25
Payer: MEDICARE

## 2022-01-25 VITALS
HEIGHT: 67 IN | DIASTOLIC BLOOD PRESSURE: 78 MMHG | WEIGHT: 184 LBS | OXYGEN SATURATION: 95 % | RESPIRATION RATE: 14 BRPM | BODY MASS INDEX: 28.88 KG/M2 | HEART RATE: 72 BPM | SYSTOLIC BLOOD PRESSURE: 115 MMHG

## 2022-01-25 DIAGNOSIS — F31.9 BIPOLAR 1 DISORDER (HCC): ICD-10-CM

## 2022-01-25 DIAGNOSIS — I10 ESSENTIAL HYPERTENSION: ICD-10-CM

## 2022-01-25 DIAGNOSIS — R73.09 ELEVATED GLUCOSE: ICD-10-CM

## 2022-01-25 DIAGNOSIS — E03.9 HYPOTHYROIDISM, UNSPECIFIED TYPE: Primary | ICD-10-CM

## 2022-01-25 DIAGNOSIS — N18.31 CHRONIC RENAL FAILURE, STAGE 3A (HCC): ICD-10-CM

## 2022-01-25 DIAGNOSIS — E78.5 HYPERLIPIDEMIA, UNSPECIFIED HYPERLIPIDEMIA TYPE: ICD-10-CM

## 2022-01-25 PROCEDURE — 1123F ACP DISCUSS/DSCN MKR DOCD: CPT | Performed by: NURSE PRACTITIONER

## 2022-01-25 PROCEDURE — 99211 OFF/OP EST MAY X REQ PHY/QHP: CPT

## 2022-01-25 PROCEDURE — 99214 OFFICE O/P EST MOD 30 MIN: CPT | Performed by: NURSE PRACTITIONER

## 2022-01-25 PROCEDURE — G8417 CALC BMI ABV UP PARAM F/U: HCPCS | Performed by: NURSE PRACTITIONER

## 2022-01-25 PROCEDURE — G8427 DOCREV CUR MEDS BY ELIG CLIN: HCPCS | Performed by: NURSE PRACTITIONER

## 2022-01-25 PROCEDURE — G8484 FLU IMMUNIZE NO ADMIN: HCPCS | Performed by: NURSE PRACTITIONER

## 2022-01-25 PROCEDURE — 4040F PNEUMOC VAC/ADMIN/RCVD: CPT | Performed by: NURSE PRACTITIONER

## 2022-01-25 PROCEDURE — 1036F TOBACCO NON-USER: CPT | Performed by: NURSE PRACTITIONER

## 2022-01-25 ASSESSMENT — ENCOUNTER SYMPTOMS
DIARRHEA: 0
NAUSEA: 0

## 2022-01-25 NOTE — PROGRESS NOTES
Name: Amy Calderón  : 1934         Chief Complaint:     Chief Complaint   Patient presents with    Follow-up     4 month check.  Anxiety     patient is currently on wellbutrin 100mg. has been on same dose for 4 years. son sharri srequesting doctor to take over managing it instread of rana       History of Present Illness:      Amy Calderón is a 80 y.o.  male who presents with Follow-up (4 month check. ) and Anxiety (patient is currently on wellbutrin 100mg. has been on same dose for 4 years. son i srequesting doctor to take over managing it instread of rana)      HPI     Hypertension:   Current medications include none. He is not monitoring his blood pressure at home. He is following a low-salt diet. Hypothyroidism:  Current medication includes levothyroxine 88 mcg. He is taking this medication in the morning. He admits normal bowel habits with the help of daily laxative. He denies hair or nail changes. Denies fatigue. Anxiety  Current treatment includes Wellbutrin 100 mg daily. He denies side effects with this medication. He was previously seeing Dr. Raz Min twice yearly. He questions if primary care can take over prescribing his Wellbutrin 100 mg. He states that this dosage has not changed within 6 years.     Past Medical History:     Past Medical History:   Diagnosis Date    Acute renal insufficiency 10/5/2015    Anxiety 10/14/2015    ASHD (arteriosclerotic heart disease) 10/14/2015    Bipolar disorder ([facility] [address] 1976    Essential hypertension 2016    Hyperlipidemia     Hypothyroidism     Localized osteoarthrosis, lower leg 10/14/2015    Parkinson's disease ([facility] [address]     light,       Reviewed all health maintenance requirements and ordered appropriate tests  Health Maintenance Due   Topic Date Due    Depression Monitoring  2022    Annual Wellness Visit (AWV)  2022       Past Surgical History:     Past Surgical History:   Procedure Laterality Date    CORONARY ARTERY BYPASS GRAFT  08/21/2015     X3 W/ LIMA TO LAD    DIAGNOSTIC CARDIAC CATH LAB PROCEDURE  08/17/15    OTHER SURGICAL HISTORY  08/21/2015    endovascular vein harvesting    OTHER SURGICAL HISTORY  08/21/2015     normothermic cardiopulmonary bypass w/ cardioplegic arrest of the heart    OTHER SURGICAL HISTORY  1994    gallstones removed, Lap        Medications:       Prior to Admission medications    Medication Sig Start Date End Date Taking? Authorizing Provider   albuterol sulfate HFA (VENTOLIN HFA) 108 (90 Base) MCG/ACT inhaler Inhale 2 puffs into the lungs every 4 hours as needed for Wheezing or Shortness of Breath 10/13/21  Yes YOLIS Marroquin CNP   levothyroxine (SYNTHROID) 88 MCG tablet TAKE 1 TABLET BY MOUTH DAILY 5/21/21  Yes Tank Perez MD   tamsulosin (FLOMAX) 0.4 MG capsule TAKE 1 CAPSULE BY MOUTH DAILY 4/12/21  Yes Cami Recinos PA-C   finasteride (PROSCAR) 5 MG tablet TAKE 1 TABLET BY MOUTH ONCE A DAY 2/8/21  Yes YOLIS Soriano CNP   polyethylene glycol (GLYCOLAX) 17 GM/SCOOP powder Take 17 g by mouth daily   Yes Historical Provider, MD   aspirin 81 MG tablet Take 81 mg by mouth daily   Yes Historical Provider, MD   Multiple Vitamin (MULTI VITAMIN DAILY PO) Take by mouth daily   Yes Historical Provider, MD   vitamin C (ASCORBIC ACID) 500 MG tablet Take 1,000 mg by mouth 2 times daily    Yes Historical Provider, MD   buPROPion [facility] SR) 100 MG extended release tablet 100 mg daily  3/26/18  Yes Historical Provider, MD   atorvastatin (LIPITOR) 20 MG tablet daily  9/28/15  Yes Historical Provider, MD   Omega-3 Fatty Acids (FISH OIL) 1000 MG CAPS Take 1,000 mg by mouth 2 times daily    Yes Historical Provider, MD        Allergies:       Patient has no known allergies. Social History:     Tobacco:    reports that he quit smoking about 44 years ago. He has never used smokeless tobacco.  Alcohol:      reports no history of alcohol use.   Drug Use:  has no history on file for drug use. Family History:     Family History   Problem Relation Age of Onset    Depression Mother     Heart Attack Mother     Breast Cancer Sister        Review of Systems:     Positive and Negative as described in HPI    Review of Systems   Gastrointestinal: Negative for diarrhea and nausea. Psychiatric/Behavioral: Negative for dysphoric mood. The patient is not nervous/anxious. Physical Exam:   Vitals:  /78   Pulse 72   Resp 14   Ht 5' 7\" (1.702 m)   Wt 184 lb (83.5 kg)   SpO2 95%   BMI 28.82 kg/m²     Physical Exam  Constitutional:       General: He is not in acute distress. Appearance: Normal appearance. He is normal weight. He is not ill-appearing or toxic-appearing. HENT:      Head: Normocephalic. Right Ear: External ear normal. There is impacted cerumen. Left Ear: External ear normal. There is impacted cerumen. Nose: Nose normal. No congestion or rhinorrhea. Mouth/Throat:      Mouth: Mucous membranes are moist.      Pharynx: No oropharyngeal exudate or posterior oropharyngeal erythema. Cardiovascular:      Rate and Rhythm: Normal rate and regular rhythm. Heart sounds: Murmur (left sternal border, systolic) heard. Pulmonary:      Effort: Pulmonary effort is normal. No respiratory distress. Breath sounds: Normal breath sounds. No stridor. No wheezing, rhonchi or rales. Musculoskeletal:      Cervical back: Neck supple. Lymphadenopathy:      Cervical: No cervical adenopathy. Neurological:      Mental Status: He is alert.          Data:     Lab Results   Component Value Date     01/20/2022    K 5.2 01/20/2022     01/20/2022    CO2 24 01/20/2022    BUN 30 01/20/2022    CREATININE 2.01 01/20/2022    GLUCOSE 119 01/20/2022    PROT 6.7 01/20/2022    LABALBU 3.9 01/20/2022    BILITOT 0.41 01/20/2022    ALKPHOS 113 01/20/2022    AST 22 01/20/2022    ALT 16 01/20/2022     Lab Results   Component Value Date    WBC 7.4 01/20/2022    RBC 4.51 01/20/2022    RBC 5.02 03/31/2012    HGB 15.3 01/20/2022    HCT 46.3 01/20/2022    .7 01/20/2022    MCH 33.9 01/20/2022    MCHC 33.0 01/20/2022    RDW 12.5 01/20/2022     01/20/2022     03/31/2012    MPV 10.6 01/20/2022     Lab Results   Component Value Date    TSH 1.69 01/20/2022     Lab Results   Component Value Date    CHOL 125 01/20/2022    HDL 35 01/20/2022    PSA 1.45 11/21/2013    LABA1C 5.7 01/20/2022       Assessment/Plan:      Diagnosis Orders   1. Hypothyroidism, unspecified type  TSH without Reflex    T4, Free   2. Hyperlipidemia, unspecified hyperlipidemia type  Lipid Panel   3. Essential hypertension  ALT    AST    CBC    Basic Metabolic Panel   4. Elevated glucose  Hemoglobin A1C   5. Bipolar 1 disorder ([facility] [address]     6. Chronic renal failure, stage 3a (HCC)         ADD and bipolar 1 disorder:  -Stable on Wellbutrin 100 mg daily. He has remained on this dose without change for upwards of 6 years. Patient and son requesting for primary care to take over dosing. I am agreeable to do so, will check with the patient's PCP Dr. Ritchie Dow to confirm that he is also okay with taking over this prescription. Impacted cerumen:  -Scheduled for bilateral ear irrigation in 2 weeks    CKD:  -Reviewed lab results from 1/2022 showing decreased but stable renal function.  Encouraged decreased salt intake, increased water intake, and avoidance of NSAIDs    Hypertension:  -Stable without medication    Wellness:  -Follow-up in 4 months for annual wellness visit with labs prior    Completed Refills   Requested Prescriptions      No prescriptions requested or ordered in this encounter       Orders Placed This Encounter   Procedures    ALT     Standing Status:   Future     Standing Expiration Date:   1/25/2023    AST     Standing Status:   Future     Standing Expiration Date:   1/25/2023    CBC     Standing Status:   Future     Standing Expiration Date:   1/25/2023    Hemoglobin A1C Standing Status:   Future     Standing Expiration Date:   1/25/2023    Basic Metabolic Panel     Standing Status:   Future     Standing Expiration Date:   1/25/2023    Lipid Panel     Standing Status:   Future     Standing Expiration Date:   1/25/2023     Order Specific Question:   Is Patient Fasting?/# of Hours     Answer:   not fasting    TSH without Reflex     Standing Status:   Future     Standing Expiration Date:   1/25/2023    T4, Free     Standing Status:   Future     Standing Expiration Date:   1/25/2023        No results found for this visit on 01/25/22. Return in about 4 months (around 5/25/2022), or if symptoms worsen or fail to improve, for AWV with labs prior.     Electronically signed by YOLIS Lemus CNP on 01/25/22 at 5:59 PM.

## 2022-01-25 NOTE — PATIENT INSTRUCTIONS
SURVEY:    You may be receiving a survey from [employer] regarding your visit today. Please complete the survey to enable us to provide the highest quality of care to you and your family. If you cannot score us a very good on any question, please call the office to discuss how we could of made your experience a very good one. Thank you.

## 2022-01-31 ENCOUNTER — TELEPHONE (OUTPATIENT)
Dept: FAMILY MEDICINE CLINIC | Age: 87
End: 2022-01-31

## 2022-01-31 NOTE — TELEPHONE ENCOUNTER
VO per Dr. Ritchie Dow, he will take over prescribing this for Dr. Rikki Downs as he has been stable. VO per Moi Luu, he doesn't need any right now but will call a week in advance for refills.

## 2022-01-31 NOTE — TELEPHONE ENCOUNTER
----- Message from Rebecca Ignacio sent at 1/31/2022  4:10 PM EST -----  Subject: Medication Problem    QUESTIONS  Name of Medication? buPROPion (WELLBUTRIN SR) 100 MG extended release   tablet  Patient-reported dosage and instructions? 100 mg once a day  What question or problem do you have with the medication? York Olszewski   just wanted to confirm that Dr. Diane Aleman can now prescribe this medication? Preferred Pharmacy? [facility] [address] [place]  [place], [address] [place] [address]  Pharmacy phone number (if available)? [phone]  Additional Information for Provider?   ---------------------------------------------------------------------------  --------------  [address]  What is the best way for the office to contact you? OK to leave message on   voicemail  Preferred Call Back Phone Number? [phone]  ---------------------------------------------------------------------------  --------------  SCRIPT ANSWERS  Relationship to Patient? Other  Representative Name? York Olszewski  Is the Representative on the appropriate HIPAA document in Epic?  Yes

## 2022-03-09 RX ORDER — BUPROPION HYDROCHLORIDE 100 MG/1
TABLET, EXTENDED RELEASE ORAL
Qty: 30 TABLET | Refills: 0 | Status: SHIPPED | OUTPATIENT
Start: 2022-03-09 | End: 2022-05-13

## 2022-05-10 ENCOUNTER — HOSPITAL ENCOUNTER (OUTPATIENT)
Age: 87
Discharge: HOME | End: 2022-05-10
Payer: MEDICARE

## 2022-05-10 DIAGNOSIS — I10 ESSENTIAL HYPERTENSION: ICD-10-CM

## 2022-05-10 DIAGNOSIS — E78.5 HYPERLIPIDEMIA, UNSPECIFIED HYPERLIPIDEMIA TYPE: ICD-10-CM

## 2022-05-10 DIAGNOSIS — E03.9 HYPOTHYROIDISM, UNSPECIFIED TYPE: ICD-10-CM

## 2022-05-10 DIAGNOSIS — R73.09 ELEVATED GLUCOSE: ICD-10-CM

## 2022-05-10 LAB
ALT SERPL-CCNC: 14 U/L (ref 5–41)
ANION GAP SERPL CALCULATED.3IONS-SCNC: 8 MMOL/L (ref 9–17)
AST SERPL-CCNC: 17 U/L
BUN BLDV-MCNC: 31 MG/DL (ref 8–23)
BUN/CREAT BLD: 16 (ref 9–20)
CALCIUM SERPL-MCNC: 9.5 MG/DL (ref 8.6–10.4)
CHLORIDE BLD-SCNC: 107 MMOL/L (ref 98–107)
CHOLESTEROL/HDL RATIO: 3.2
CHOLESTEROL: 101 MG/DL
CO2: 28 MMOL/L (ref 20–31)
CREAT SERPL-MCNC: 1.98 MG/DL (ref 0.7–1.2)
GFR AFRICAN AMERICAN: 39 ML/MIN
GFR NON-AFRICAN AMERICAN: 32 ML/MIN
GFR SERPL CREATININE-BSD FRML MDRD: ABNORMAL ML/MIN/{1.73_M2}
GFR SERPL CREATININE-BSD FRML MDRD: ABNORMAL ML/MIN/{1.73_M2}
GLUCOSE BLD-MCNC: 116 MG/DL (ref 70–99)
HCT VFR BLD CALC: 45.5 % (ref 40.7–50.3)
HDLC SERPL-MCNC: 32 MG/DL
HEMOGLOBIN: 14.6 G/DL (ref 13–17)
LDL CHOLESTEROL: 50 MG/DL (ref 0–130)
MCH RBC QN AUTO: 33.3 PG (ref 25.2–33.5)
MCHC RBC AUTO-ENTMCNC: 32.1 G/DL (ref 28.4–34.8)
MCV RBC AUTO: 103.6 FL (ref 82.6–102.9)
NRBC AUTOMATED: 0 PER 100 WBC
PDW BLD-RTO: 13 % (ref 11.8–14.4)
PLATELET # BLD: 263 K/UL (ref 138–453)
PMV BLD AUTO: 11.1 FL (ref 8.1–13.5)
POTASSIUM SERPL-SCNC: 5.3 MMOL/L (ref 3.7–5.3)
RBC # BLD: 4.39 M/UL (ref 4.21–5.77)
SODIUM BLD-SCNC: 143 MMOL/L (ref 135–144)
THYROXINE, FREE: 1.17 NG/DL (ref 0.93–1.7)
TRIGL SERPL-MCNC: 95 MG/DL
TSH SERPL DL<=0.05 MIU/L-ACNC: 1.4 UIU/ML (ref 0.3–5)
WBC # BLD: 7.8 K/UL (ref 3.5–11.3)

## 2022-05-10 PROCEDURE — 84460 ALANINE AMINO (ALT) (SGPT): CPT

## 2022-05-10 PROCEDURE — 36415 COLL VENOUS BLD VENIPUNCTURE: CPT

## 2022-05-10 PROCEDURE — 84439 ASSAY OF FREE THYROXINE: CPT

## 2022-05-10 PROCEDURE — 80061 LIPID PANEL: CPT

## 2022-05-10 PROCEDURE — 85027 COMPLETE CBC AUTOMATED: CPT

## 2022-05-10 PROCEDURE — 84450 TRANSFERASE (AST) (SGOT): CPT

## 2022-05-10 PROCEDURE — 83036 HEMOGLOBIN GLYCOSYLATED A1C: CPT

## 2022-05-10 PROCEDURE — 80048 BASIC METABOLIC PNL TOTAL CA: CPT

## 2022-05-10 PROCEDURE — 84443 ASSAY THYROID STIM HORMONE: CPT

## 2022-05-11 LAB
ESTIMATED AVERAGE GLUCOSE: 117 MG/DL
HBA1C MFR BLD: 5.7 % (ref 4–6)

## 2022-05-13 RX ORDER — BUPROPION HYDROCHLORIDE 100 MG/1
TABLET, EXTENDED RELEASE ORAL
Qty: 30 TABLET | Refills: 0 | Status: SHIPPED | OUTPATIENT
Start: 2022-05-13 | End: 2022-07-15

## 2022-05-24 ENCOUNTER — OFFICE VISIT (OUTPATIENT)
Dept: FAMILY MEDICINE CLINIC | Age: 87
End: 2022-05-24
Payer: MEDICARE

## 2022-05-24 VITALS
HEIGHT: 67 IN | DIASTOLIC BLOOD PRESSURE: 60 MMHG | BODY MASS INDEX: 28.56 KG/M2 | WEIGHT: 182 LBS | SYSTOLIC BLOOD PRESSURE: 102 MMHG

## 2022-05-24 DIAGNOSIS — I10 ESSENTIAL HYPERTENSION: ICD-10-CM

## 2022-05-24 DIAGNOSIS — R73.9 HYPERGLYCEMIA: ICD-10-CM

## 2022-05-24 DIAGNOSIS — Z00.00 ENCOUNTER FOR ANNUAL WELLNESS EXAM IN MEDICARE PATIENT: Primary | ICD-10-CM

## 2022-05-24 DIAGNOSIS — N18.31 CHRONIC RENAL FAILURE, STAGE 3A (HCC): ICD-10-CM

## 2022-05-24 DIAGNOSIS — E03.9 HYPOTHYROIDISM, UNSPECIFIED TYPE: ICD-10-CM

## 2022-05-24 DIAGNOSIS — E78.5 HYPERLIPIDEMIA, UNSPECIFIED HYPERLIPIDEMIA TYPE: ICD-10-CM

## 2022-05-24 DIAGNOSIS — N18.32 STAGE 3B CHRONIC KIDNEY DISEASE (HCC): ICD-10-CM

## 2022-05-24 DIAGNOSIS — F31.9 BIPOLAR 1 DISORDER (HCC): ICD-10-CM

## 2022-05-24 DIAGNOSIS — I25.10 ASHD (ARTERIOSCLEROTIC HEART DISEASE): ICD-10-CM

## 2022-05-24 PROBLEM — N18.30 CHRONIC RENAL DISEASE, STAGE III (HCC): Status: ACTIVE | Noted: 2022-05-24

## 2022-05-24 PROCEDURE — 1036F TOBACCO NON-USER: CPT | Performed by: FAMILY MEDICINE

## 2022-05-24 PROCEDURE — G8427 DOCREV CUR MEDS BY ELIG CLIN: HCPCS | Performed by: FAMILY MEDICINE

## 2022-05-24 PROCEDURE — 99211 OFF/OP EST MAY X REQ PHY/QHP: CPT | Performed by: FAMILY MEDICINE

## 2022-05-24 PROCEDURE — 99214 OFFICE O/P EST MOD 30 MIN: CPT | Performed by: FAMILY MEDICINE

## 2022-05-24 PROCEDURE — 1123F ACP DISCUSS/DSCN MKR DOCD: CPT | Performed by: FAMILY MEDICINE

## 2022-05-24 PROCEDURE — G8417 CALC BMI ABV UP PARAM F/U: HCPCS | Performed by: FAMILY MEDICINE

## 2022-05-24 PROCEDURE — G0439 PPPS, SUBSEQ VISIT: HCPCS | Performed by: FAMILY MEDICINE

## 2022-05-24 ASSESSMENT — PATIENT HEALTH QUESTIONNAIRE - PHQ9
8. MOVING OR SPEAKING SO SLOWLY THAT OTHER PEOPLE COULD HAVE NOTICED. OR THE OPPOSITE, BEING SO FIGETY OR RESTLESS THAT YOU HAVE BEEN MOVING AROUND A LOT MORE THAN USUAL: 0
1. LITTLE INTEREST OR PLEASURE IN DOING THINGS: 2
SUM OF ALL RESPONSES TO PHQ QUESTIONS 1-9: 4
10. IF YOU CHECKED OFF ANY PROBLEMS, HOW DIFFICULT HAVE THESE PROBLEMS MADE IT FOR YOU TO DO YOUR WORK, TAKE CARE OF THINGS AT HOME, OR GET ALONG WITH OTHER PEOPLE: 0
7. TROUBLE CONCENTRATING ON THINGS, SUCH AS READING THE NEWSPAPER OR WATCHING TELEVISION: 0
SUM OF ALL RESPONSES TO PHQ9 QUESTIONS 1 & 2: 2
4. FEELING TIRED OR HAVING LITTLE ENERGY: 2
6. FEELING BAD ABOUT YOURSELF - OR THAT YOU ARE A FAILURE OR HAVE LET YOURSELF OR YOUR FAMILY DOWN: 0
SUM OF ALL RESPONSES TO PHQ QUESTIONS 1-9: 4
2. FEELING DOWN, DEPRESSED OR HOPELESS: 0
5. POOR APPETITE OR OVEREATING: 0
9. THOUGHTS THAT YOU WOULD BE BETTER OFF DEAD, OR OF HURTING YOURSELF: 0
3. TROUBLE FALLING OR STAYING ASLEEP: 0

## 2022-05-24 ASSESSMENT — LIFESTYLE VARIABLES: HOW OFTEN DO YOU HAVE A DRINK CONTAINING ALCOHOL: NEVER

## 2022-05-24 NOTE — PROGRESS NOTES
LUIZ Mazariegos am scribing for and in the presence of Dr. Niurka Knott MD. 05/24/2022 3:49 pm K       Medicare Annual Wellness Visit    Colby Correa is here for Medicare AWV    Assessment & Plan   Hypothyroidism, unspecified type  -     ALT; Future  -     AST; Future  -     CBC with Auto Differential; Future  -     Hemoglobin A1C; Future  -     Basic Metabolic Panel; Future  -     Lipid Panel; Future  -     TSH; Future  -     T4, Free; Future  Hyperlipidemia, unspecified hyperlipidemia type  -     ALT; Future  -     AST; Future  -     CBC with Auto Differential; Future  -     Hemoglobin A1C; Future  -     Basic Metabolic Panel; Future  -     Lipid Panel; Future  -     TSH; Future  -     T4, Free; Future  Essential hypertension  -     ALT; Future  -     AST; Future  -     CBC with Auto Differential; Future  -     Hemoglobin A1C; Future  -     Basic Metabolic Panel; Future  -     Lipid Panel; Future  -     TSH; Future  -     T4, Free; Future  Hyperglycemia  -     ALT; Future  -     AST; Future  -     CBC with Auto Differential; Future  -     Hemoglobin A1C; Future  -     Basic Metabolic Panel; Future  -     Lipid Panel; Future  -     TSH; Future  -     T4, Free; Future  Chronic renal failure, stage 3a (HCC)  Stage 3b chronic kidney disease (HCC)  ASHD (arteriosclerotic heart disease)  Bipolar 1 disorder ([facility] [address]      Recommendations for Preventive Services Due: see orders and patient instructions/AVS.  Recommended screening schedule for the next 5-10 years is provided to the patient in written form: see Patient Instructions/AVS.     Return in about 6 months (around 11/24/2022) for labs prior. Subjective   The following acute and/or chronic problems were also addressed today:    ASHD:  Patient presents for follow-up of atherosclerotic coronary artery disease. The patient denies chest pain, SOB, lightheadedness, palpitations and leg edema, he admits to no current symptoms.  His most strenuous activity is walking and this never precipitates chest pain.      HYPERLIPIDEMIA   Medication compliance: compliant all of the time. Patient is following a low fat, low cholesterol diet. He is exercising regularly (walking).     HYPERTENSION:  He is not exercising, for n/a hours per week. He is adherent to a low-salt diet.    Blood pressure is not being monitored at home     Accompanied by son, Kaity Goodrich    ROS:  General Constitutional: Denies chills. Denies fever. Denies headache. Denies lightheadedness. Ophthalmologic: Denies blurred vision. Wearing glasses  ENT: Denies nasal congestion. Denies sore throat. Denies ear pain and pressure. Respiratory: Denies cough. Denies shortness of breath. Denies wheezing. Cardiovascular: Denies chest pain at rest. Denies irregular heartbeat. Denies palpitations. Gastrointestinal: Denies abdominal pain. Denies blood in the stool. Denies constipation. Denies diarrhea. Denies nausea. Denies vomiting. Genitourinary: Denies blood in the urine. Denies difficulty urinating. Denies frequent urination. Denies painful urination. Denies urinary incontinence  Musculoskeletal: Denies muscle aches. Denies painful joints. Denies swollen joints. Peripheral Vascular: Denies pain/cramping in legs after exertion. Skin: Denies dry skin. Denies itching. Denies rash. Neurologic: Denies falls. Denies dizziness. Denies fainting. Denies tingling/numbness. Psychiatric: Denies sleep disturbance. Denies anxiety. Denies depressed mood. Patient's complete Health Risk Assessment and screening values have been reviewed and are found in Flowsheets. The following problems were reviewed today and where indicated follow up appointments were made and/or referrals ordered.     Positive Risk Factor Screenings with Interventions:    Fall Risk:  Do you feel unsteady or are you worried about falling? : no  2 or more falls in past year?: (!) yes  Fall with injury in past year?: no                 General Health and ACP:  General  In general, how would you say your health is?: Good  In the past 7 days, have you experienced any of the following: New or Increased Pain, New or Increased Fatigue, Loneliness, Social Isolation, Stress or Anger?: No  Do you get the social and emotional support that you need?: Yes  Do you have a Living Will?: Yes    Advance Directives     Power of  Living Will ACP-Advance Directive ACP-Power of     Not on File Not on File Not on File Not on File      General Health Risk Interventions:  · see plan for any interventions    Health Habits/Nutrition:     Physical Activity: Insufficiently Active    Days of Exercise per Week: 1 day    Minutes of Exercise per Session: 20 min     Have you lost any weight without trying in the past 3 months?: (!) Yes  Body mass index: (!) 28.5  Have you seen the dentist within the past year?: (!) No    Hearing/Vision:  Do you or your family notice any trouble with your hearing that hasn't been managed with hearing aids?: (!) Yes  Do you have difficulty driving, watching TV, or doing any of your daily activities because of your eyesight?: No  Have you had an eye exam within the past year?: (!) No  No exam data present     ADLs:  In the past 7 days, did you need help from others to perform any of the following everyday activities: Eating, dressing, grooming, bathing, toileting, or walking/balance?: (!) Yes  Select all that apply: (!) Walking/Balance  In the past 7 days, did you need help from others to take care of any of the following: Laundry, housekeeping, banking/finances, shopping, telephone use, food preparation, transportation, or taking medications?: (!) Yes  Select all that apply: (!) Banking/Finances          Objective   Vitals:    05/24/22 1523   BP: 102/60   Weight: 182 lb (82.6 kg)   Height: 5' 7\" (1.702 m)      Body mass index is 28.51 kg/m².         General Appearance: alert and oriented to person, place and time, well developed and well- nourished, in no acute distress  Skin: warm and dry, no rash or erythema  Head: normocephalic and atraumatic  Eyes: pupils equal, round, and reactive to light, extraocular eye movements intact, conjunctivae normal  ENT: tympanic membrane, external ear and ear canal normal bilaterally, nose without deformity, nasal mucosa and turbinates normal without polyps  Neck: supple and non-tender without mass, no thyromegaly or thyroid nodules, no cervical lymphadenopathy  Pulmonary/Chest: clear to auscultation bilaterally- no wheezes, rales or rhonchi, normal air movement, no respiratory distress  Cardiovascular: normal rate, regular rhythm, normal S1 and S2, no murmurs, rubs, clicks, or gallops, distal pulses intact, no carotid bruits Rate: 55  Abdomen: soft, non-tender, non-distended, normal bowel sounds, no masses or organomegaly  Extremities: no cyanosis, clubbing or edema  Musculoskeletal: normal range of motion, no joint swelling, deformity or tenderness  Neurologic: reflexes normal and symmetric, no cranial nerve deficit, gait, coordination and speech normal       No Known Allergies  Prior to Visit Medications    Medication Sig Taking?  Authorizing Provider   buPROPion (WELLBUTRIN SR) 100 MG extended release tablet TAKE 1 TABLET BY MOUTH ONCE A DAY Yes Stew Kahn MD   finasteride (PROSCAR) 5 MG tablet TAKE 1 TABLET BY MOUTH ONCE A DAY Yes YOLIS Armstrong - CNP   levothyroxine (SYNTHROID) 88 MCG tablet TAKE 1 TABLET BY MOUTH DAILY Yes Stew Kahn MD   tamsulosin (FLOMAX) 0.4 MG capsule TAKE 1 CAPSULE BY MOUTH DAILY Yes Ra Recinos PA-C   polyethylene glycol (GLYCOLAX) 17 GM/SCOOP powder Take 17 g by mouth daily Yes Historical Provider, MD   aspirin 81 MG tablet Take 81 mg by mouth daily Yes Historical Provider, MD   Multiple Vitamin (MULTI VITAMIN DAILY PO) Take by mouth daily Yes Historical Provider, MD   vitamin C (ASCORBIC ACID) 500 MG tablet Take 1,000 mg by mouth 2 times daily  Yes Historical Provider, MD atorvastatin (LIPITOR) 20 MG tablet daily  Yes Historical Provider, MD   Omega-3 Fatty Acids (FISH OIL) 1000 MG CAPS Take 1,000 mg by mouth 2 times daily  Yes Historical Provider, MD   albuterol sulfate HFA (VENTOLIN HFA) 108 (90 Base) MCG/ACT inhaler Inhale 2 puffs into the lungs every 4 hours as needed for Wheezing or Shortness of Breath  Patient not taking: Reported on 5/24/2022  YOLIS Saini - CNP       [facility] Foundation[facility] (Including outside providers/suppliers regularly involved in providing care):   Patient Care Team:  Marguerite Bowling MD as PCP - General (Family Medicine)  Marguerite Bowling MD as PCP - [facility] Empaneled Provider     Reviewed and updated this visit:  Tobacco  Allergies  Meds  Med Hx  Surg Hx  Soc Hx  Fam Hx      Plan:  The history listed above was reviewed today and is accurate. His labs are reviewed. His creatinine level has jumped around a bit but has stated in the high one, low 2 range. As long as this stays in that range, I will not make any changes. He continues on buproprion in which I have taken over managing. I see no reason to make any adjustments to this at this time. We spend a lengthy time discussing covid boosters. I advise against him getting the booster unless he has a period in the near future in which he will be at increased risk for exposure as the evidence only shows benefit for about 4-6 weeks following getting the vaccine. I will see him back in 6 months. I, Dr. Marguerite Bowling, personally performed the services described in this documentation as scribed/transcribed by LUIZ Bryson in my presence, and is both accurate and complete.

## 2022-05-24 NOTE — PATIENT INSTRUCTIONS
Plan:  The history listed above was reviewed today and is accurate. His labs are reviewed. His creatinine level has jumped around a bit but has stated in the high one, low 2 range. As long as this stays in that range, I will not make any changes. He continues on buproprion in which I have taken over managing. I see no reason to make any adjustments to this at this time. We spend a lengthy time discussing covid boosters. I advise against him getting the booster unless he has a period in the near future in which he will be at increased risk for exposure as the evidence only shows benefit for about 4-6 weeks following getting the vaccine. I will see him back in 6 months. SURVEY:    You may be receiving a survey from [employer] regarding your visit today. Please complete the survey to enable us to provide the highest quality of care to you and your family. If you cannot score us a very good on any question, please call the office to discuss how we could of made your experience a very good one. Thank you.

## 2022-06-06 RX ORDER — TAMSULOSIN HYDROCHLORIDE 0.4 MG/1
0.4 CAPSULE ORAL DAILY
Qty: 90 CAPSULE | Refills: 0 | Status: SHIPPED | OUTPATIENT
Start: 2022-06-06 | End: 2022-11-03

## 2022-06-20 ENCOUNTER — OFFICE VISIT (OUTPATIENT)
Dept: UROLOGY | Age: 87
End: 2022-06-20
Payer: MEDICARE

## 2022-06-20 VITALS
TEMPERATURE: 98.2 F | WEIGHT: 180 LBS | DIASTOLIC BLOOD PRESSURE: 72 MMHG | SYSTOLIC BLOOD PRESSURE: 118 MMHG | HEIGHT: 67 IN | BODY MASS INDEX: 28.25 KG/M2

## 2022-06-20 DIAGNOSIS — R33.8 BENIGN PROSTATIC HYPERPLASIA WITH URINARY RETENTION: ICD-10-CM

## 2022-06-20 DIAGNOSIS — N40.1 BENIGN PROSTATIC HYPERPLASIA WITH URINARY RETENTION: ICD-10-CM

## 2022-06-20 PROCEDURE — G8417 CALC BMI ABV UP PARAM F/U: HCPCS | Performed by: UROLOGY

## 2022-06-20 PROCEDURE — 1036F TOBACCO NON-USER: CPT | Performed by: UROLOGY

## 2022-06-20 PROCEDURE — 99211 OFF/OP EST MAY X REQ PHY/QHP: CPT | Performed by: UROLOGY

## 2022-06-20 PROCEDURE — 99213 OFFICE O/P EST LOW 20 MIN: CPT | Performed by: UROLOGY

## 2022-06-20 PROCEDURE — PBSHW PR MEASUREMENT,POST-VOID RESIDUAL VOLUME BY US,NON-IMAGING: Performed by: UROLOGY

## 2022-06-20 PROCEDURE — 1123F ACP DISCUSS/DSCN MKR DOCD: CPT | Performed by: UROLOGY

## 2022-06-20 PROCEDURE — G8427 DOCREV CUR MEDS BY ELIG CLIN: HCPCS | Performed by: UROLOGY

## 2022-06-20 PROCEDURE — 51798 US URINE CAPACITY MEASURE: CPT | Performed by: UROLOGY

## 2022-06-20 NOTE — PROGRESS NOTES
HPI:          Patient is a 80 y.o. male in no acute distress. He is alert and oriented to person, place, and time. History  2015 Difficulty voiding after open heart surgery.  ml after not voiding for 2.5 hours. We started Flomax and added Proscar. Encouraged double voiding for incomplete bladder emptying.      PVR 70ml-360ml     10/2019 Increased Flomax to twice per day.  Continued Reji Ingles to his next visit we will check a BMP and renal ultrasound.  Did explain to the patient and his son that we are trying to prevent worsening renal function, we will likely not be successful in improving the renal function.  Renal function from 7/2019 reviewed: BUN 23, creatinine 2.04, GFR 31. Patient is on aspirin 325 mg daily. Tati Duncan does have an appointment next month with his cardiologist. Diana Ramirez am not sure if it would be beneficial to decrease the dose to 81 mg.  We will have the patient back in 4 to 6 weeks to review his renal function, ultrasound and postvoid residual.  We may consider proceeding with cystoscopy, or continued observation. Marisol Castro does have a long history of incomplete bladder emptying.     11/2019 Flomax back to once a day         Currently  Patient is here today for 6-month follow-up. Patient did have a bladder scan performed today. Patient was able to void 300 mL today. At the last visit we did increase his Flomax to twice daily. Patient's creatinine today was stable at 1.98.  Current GFR is 32. Patient does have nocturia x2. Otherwise he feels like he has some baseline urgency and frequency. No current dysuria. He has no recent gross hematuria or dysuria. No pain today.   Past Medical History:   Diagnosis Date    Acute renal insufficiency 10/5/2015    Anxiety 10/14/2015    ASHD (arteriosclerotic heart disease) 10/14/2015    Bipolar disorder ([facility] [address] 1976    Essential hypertension 9/23/2016    Hyperlipidemia     Hypothyroidism     Localized osteoarthrosis, lower leg 10/14/2015    Parkinson's disease ([facility] [address]     light, 2010     Past Surgical History:   Procedure Laterality Date    CORONARY ARTERY BYPASS GRAFT  08/21/2015     X3 W/ LIMA TO LAD    DIAGNOSTIC CARDIAC CATH LAB PROCEDURE  08/17/15    OTHER SURGICAL HISTORY  08/21/2015    endovascular vein harvesting    OTHER SURGICAL HISTORY  08/21/2015     normothermic cardiopulmonary bypass w/ cardioplegic arrest of the heart    OTHER SURGICAL HISTORY  1994    gallstones removed, Lap     Outpatient Encounter Medications as of 6/20/2022   Medication Sig Dispense Refill    tamsulosin (FLOMAX) 0.4 mg capsule TAKE 1 CAPSULE BY MOUTH DAILY 90 capsule 0    buPROPion (WELLBUTRIN SR) 100 MG extended release tablet TAKE 1 TABLET BY MOUTH ONCE A DAY 30 tablet 0    finasteride (PROSCAR) 5 MG tablet TAKE 1 TABLET BY MOUTH ONCE A DAY 90 tablet 1    levothyroxine (SYNTHROID) 88 MCG tablet TAKE 1 TABLET BY MOUTH DAILY 90 tablet 3    polyethylene glycol (GLYCOLAX) 17 GM/SCOOP powder Take 17 g by mouth daily      aspirin 81 MG tablet Take 81 mg by mouth daily      Multiple Vitamin (MULTI VITAMIN DAILY PO) Take by mouth daily      vitamin C (ASCORBIC ACID) 500 MG tablet Take 1,000 mg by mouth 2 times daily       atorvastatin (LIPITOR) 20 MG tablet daily       Omega-3 Fatty Acids (FISH OIL) 1000 MG CAPS Take 1,000 mg by mouth 2 times daily       albuterol sulfate HFA (VENTOLIN HFA) 108 (90 Base) MCG/ACT inhaler Inhale 2 puffs into the lungs every 4 hours as needed for Wheezing or Shortness of Breath (Patient not taking: Reported on 5/24/2022) 18 g 2     No facility-administered encounter medications on file as of 6/20/2022.       Current Outpatient Medications on File Prior to Visit   Medication Sig Dispense Refill    tamsulosin (FLOMAX) 0.4 mg capsule TAKE 1 CAPSULE BY MOUTH DAILY 90 capsule 0    buPROPion (WELLBUTRIN SR) 100 MG extended release tablet TAKE 1 TABLET BY MOUTH ONCE A DAY 30 tablet 0    finasteride (PROSCAR) 5 MG tablet TAKE 1 TABLET BY MOUTH ONCE A DAY 90 tablet 1    levothyroxine (SYNTHROID) 88 MCG tablet TAKE 1 TABLET BY MOUTH DAILY 90 tablet 3    polyethylene glycol (GLYCOLAX) 17 GM/SCOOP powder Take 17 g by mouth daily      aspirin 81 MG tablet Take 81 mg by mouth daily      Multiple Vitamin (MULTI VITAMIN DAILY PO) Take by mouth daily      vitamin C (ASCORBIC ACID) 500 MG tablet Take 1,000 mg by mouth 2 times daily       atorvastatin (LIPITOR) 20 MG tablet daily       Omega-3 Fatty Acids (FISH OIL) 1000 MG CAPS Take 1,000 mg by mouth 2 times daily       albuterol sulfate HFA (VENTOLIN HFA) 108 (90 Base) MCG/ACT inhaler Inhale 2 puffs into the lungs every 4 hours as needed for Wheezing or Shortness of Breath (Patient not taking: Reported on 2022) 18 g 2     No current facility-administered medications on file prior to visit. Patient has no known allergies. Family History   Problem Relation Age of Onset    Depression Mother     Heart Attack Mother     Breast Cancer Sister      Social History     Tobacco Use   Smoking Status Former Smoker    Quit date: 1978    Years since quittin.4   Smokeless Tobacco Never Used       Social History     Substance and Sexual Activity   Alcohol Use No       Review of Systems    /72 (Site: Right Upper Arm, Position: Sitting, Cuff Size: Large Adult)   Temp 98.2 °F (36.8 °C) (Temporal)   Ht 5' 7\" (1.702 m)   Wt 180 lb (81.6 kg)   BMI 28.19 kg/m²       PHYSICAL EXAM:  Constitutional: Patient in no acute distress; Neuro: alert and oriented to person place and time. Psych: Mood and affect normal.  Skin: Normal  Lungs: Respiratory effort normal  Cardiovascular:  Normal peripheral pulses  Abdomen: Soft, non-tender, non-distended with no CVA, flank pain  Bladder non-tender and not distended.   Lymphatics: no palpable lymphadenopathy  Penis normal  Urethral meatus normal  Scrotal exam normal  Testicles normal bilaterally  Epididymis normal bilaterally  No evidence of inguinal hernia        Lab Results   Component Value Date    BUN 31 (H) 05/10/2022     Lab Results   Component Value Date    CREATININE 1.98 (H) 05/10/2022     Lab Results   Component Value Date    PSA 1.45 11/21/2013       ASSESSMENT:  This is a 80 y.o. male with the following diagnoses:   Diagnosis Orders   1. Benign prostatic hyperplasia with urinary retention  MI MEASUREMENT,POST-VOID RESIDUAL VOLUME BY US,NON-IMAGING       PLAN:  Patient will follow up with us in 6 months. We will review his labs when he returns. For now we will maintain once daily Flomax.

## 2022-06-20 NOTE — PROGRESS NOTES
Random bladderscan performed in office today:  Pt last voided 4 hours ago, scan = 253 mL    He ended up voiding 300mL

## 2022-06-20 NOTE — PATIENT INSTRUCTIONS
SURVEY:    You may be receiving a survey from [employer] regarding your visit today. Please complete the survey to enable us to provide the highest quality of care to you and your family. If you cannot score us a very good on any question, please call the office to discuss how we could of made your experience a very good one. Thank you.

## 2022-11-03 RX ORDER — TAMSULOSIN HYDROCHLORIDE 0.4 MG/1
0.4 CAPSULE ORAL DAILY
Qty: 90 CAPSULE | Refills: 0 | Status: SHIPPED | OUTPATIENT
Start: 2022-11-03

## 2022-11-11 DIAGNOSIS — N40.1 BENIGN PROSTATIC HYPERPLASIA WITH URINARY RETENTION: Primary | ICD-10-CM

## 2022-11-11 DIAGNOSIS — R33.8 BENIGN PROSTATIC HYPERPLASIA WITH URINARY RETENTION: Primary | ICD-10-CM

## 2022-11-11 RX ORDER — FINASTERIDE 5 MG/1
TABLET, FILM COATED ORAL
Qty: 90 TABLET | Refills: 0 | Status: SHIPPED | OUTPATIENT
Start: 2022-11-11

## 2022-11-15 ENCOUNTER — HOSPITAL ENCOUNTER (OUTPATIENT)
Age: 87
Discharge: HOME | End: 2022-11-15
Payer: MEDICARE

## 2022-11-15 DIAGNOSIS — R73.9 HYPERGLYCEMIA: ICD-10-CM

## 2022-11-15 DIAGNOSIS — E03.9 HYPOTHYROIDISM, UNSPECIFIED TYPE: ICD-10-CM

## 2022-11-15 DIAGNOSIS — E78.5 HYPERLIPIDEMIA, UNSPECIFIED HYPERLIPIDEMIA TYPE: ICD-10-CM

## 2022-11-15 DIAGNOSIS — I10 ESSENTIAL HYPERTENSION: ICD-10-CM

## 2022-11-15 LAB
ABSOLUTE EOS #: 0.78 K/UL (ref 0–0.44)
ABSOLUTE IMMATURE GRANULOCYTE: <0.03 K/UL (ref 0–0.3)
ABSOLUTE LYMPH #: 2.3 K/UL (ref 1.1–3.7)
ABSOLUTE MONO #: 1.11 K/UL (ref 0.1–1.2)
ALT SERPL-CCNC: 14 U/L (ref 5–41)
ANION GAP SERPL CALCULATED.3IONS-SCNC: 9 MMOL/L (ref 9–17)
AST SERPL-CCNC: 18 U/L
BASOPHILS # BLD: 1 % (ref 0–2)
BASOPHILS ABSOLUTE: 0.06 K/UL (ref 0–0.2)
BUN BLDV-MCNC: 35 MG/DL (ref 8–23)
BUN/CREAT BLD: 16 (ref 9–20)
CALCIUM SERPL-MCNC: 9.7 MG/DL (ref 8.6–10.4)
CHLORIDE BLD-SCNC: 108 MMOL/L (ref 98–107)
CO2: 27 MMOL/L (ref 20–31)
CREAT SERPL-MCNC: 2.25 MG/DL (ref 0.7–1.2)
EOSINOPHILS RELATIVE PERCENT: 8 % (ref 1–4)
GFR SERPL CREATININE-BSD FRML MDRD: 27 ML/MIN/1.73M2
GLUCOSE BLD-MCNC: 89 MG/DL (ref 70–99)
HCT VFR BLD CALC: 49.3 % (ref 40.7–50.3)
HEMOGLOBIN: 16.4 G/DL (ref 13–17)
IMMATURE GRANULOCYTES: 0 %
LYMPHOCYTES # BLD: 24 % (ref 24–43)
MCH RBC QN AUTO: 34.7 PG (ref 25.2–33.5)
MCHC RBC AUTO-ENTMCNC: 33.3 G/DL (ref 28.4–34.8)
MCV RBC AUTO: 104.4 FL (ref 82.6–102.9)
MONOCYTES # BLD: 12 % (ref 3–12)
NRBC AUTOMATED: 0 PER 100 WBC
PDW BLD-RTO: 13 % (ref 11.8–14.4)
PLATELET # BLD: 267 K/UL (ref 138–453)
PMV BLD AUTO: 10.8 FL (ref 8.1–13.5)
POTASSIUM SERPL-SCNC: 4.5 MMOL/L (ref 3.7–5.3)
RBC # BLD: 4.72 M/UL (ref 4.21–5.77)
SEG NEUTROPHILS: 55 % (ref 36–65)
SEGMENTED NEUTROPHILS ABSOLUTE COUNT: 5.4 K/UL (ref 1.5–8.1)
SODIUM BLD-SCNC: 144 MMOL/L (ref 135–144)
TSH SERPL DL<=0.05 MIU/L-ACNC: 1.39 UIU/ML (ref 0.3–5)
WBC # BLD: 9.7 K/UL (ref 3.5–11.3)

## 2022-11-15 PROCEDURE — 80061 LIPID PANEL: CPT

## 2022-11-15 PROCEDURE — 36415 COLL VENOUS BLD VENIPUNCTURE: CPT

## 2022-11-15 PROCEDURE — 83036 HEMOGLOBIN GLYCOSYLATED A1C: CPT

## 2022-11-15 PROCEDURE — 84439 ASSAY OF FREE THYROXINE: CPT

## 2022-11-15 PROCEDURE — 84450 TRANSFERASE (AST) (SGOT): CPT

## 2022-11-15 PROCEDURE — 84443 ASSAY THYROID STIM HORMONE: CPT

## 2022-11-15 PROCEDURE — 80048 BASIC METABOLIC PNL TOTAL CA: CPT

## 2022-11-15 PROCEDURE — 85025 COMPLETE CBC W/AUTO DIFF WBC: CPT

## 2022-11-15 PROCEDURE — 84460 ALANINE AMINO (ALT) (SGPT): CPT

## 2022-11-16 LAB
CHOLESTEROL/HDL RATIO: 2.8
CHOLESTEROL: 105 MG/DL
ESTIMATED AVERAGE GLUCOSE: 114 MG/DL
HBA1C MFR BLD: 5.6 % (ref 4–6)
HDLC SERPL-MCNC: 37 MG/DL
LDL CHOLESTEROL: 49 MG/DL (ref 0–130)
THYROXINE, FREE: 1.12 NG/DL (ref 0.93–1.7)
TRIGL SERPL-MCNC: 93 MG/DL

## 2022-11-21 PROBLEM — N18.4 CKD (CHRONIC KIDNEY DISEASE) STAGE 4, GFR 15-29 ML/MIN (HCC): Status: ACTIVE | Noted: 2022-11-21

## 2022-12-19 ENCOUNTER — OFFICE VISIT (OUTPATIENT)
Dept: UROLOGY | Age: 87
End: 2022-12-19
Payer: MEDICARE

## 2022-12-19 VITALS
TEMPERATURE: 97.8 F | BODY MASS INDEX: 27.78 KG/M2 | DIASTOLIC BLOOD PRESSURE: 72 MMHG | WEIGHT: 177 LBS | SYSTOLIC BLOOD PRESSURE: 122 MMHG | HEART RATE: 84 BPM | HEIGHT: 67 IN

## 2022-12-19 DIAGNOSIS — R33.8 BENIGN PROSTATIC HYPERPLASIA WITH URINARY RETENTION: Primary | ICD-10-CM

## 2022-12-19 DIAGNOSIS — R33.9 INCOMPLETE BLADDER EMPTYING: ICD-10-CM

## 2022-12-19 DIAGNOSIS — N40.1 BENIGN PROSTATIC HYPERPLASIA WITH URINARY RETENTION: Primary | ICD-10-CM

## 2022-12-19 PROCEDURE — 51798 US URINE CAPACITY MEASURE: CPT | Performed by: UROLOGY

## 2022-12-19 PROCEDURE — 99213 OFFICE O/P EST LOW 20 MIN: CPT | Performed by: UROLOGY

## 2022-12-19 PROCEDURE — 1123F ACP DISCUSS/DSCN MKR DOCD: CPT | Performed by: UROLOGY

## 2022-12-19 PROCEDURE — 1036F TOBACCO NON-USER: CPT | Performed by: UROLOGY

## 2022-12-19 PROCEDURE — G8484 FLU IMMUNIZE NO ADMIN: HCPCS | Performed by: UROLOGY

## 2022-12-19 PROCEDURE — G8427 DOCREV CUR MEDS BY ELIG CLIN: HCPCS | Performed by: UROLOGY

## 2022-12-19 PROCEDURE — PBSHW PR MEASUREMENT,POST-VOID RESIDUAL VOLUME BY US,NON-IMAGING: Performed by: UROLOGY

## 2022-12-19 PROCEDURE — G8417 CALC BMI ABV UP PARAM F/U: HCPCS | Performed by: UROLOGY

## 2022-12-19 ASSESSMENT — ENCOUNTER SYMPTOMS
BACK PAIN: 0
WHEEZING: 0
CONSTIPATION: 0
VOMITING: 0
ABDOMINAL PAIN: 0
SHORTNESS OF BREATH: 0
EYE REDNESS: 0
COUGH: 0
COLOR CHANGE: 0
NAUSEA: 0

## 2022-12-19 NOTE — PATIENT INSTRUCTIONS
SURVEY:    You may be receiving a survey from [employer] regarding your visit today. Please complete the survey to enable us to provide the highest quality of care to you and your family. If you cannot score us a very good on any question, please call the office to discuss how we could have made your experience a very good one. Thank you.

## 2022-12-19 NOTE — PROGRESS NOTES
HPI:          Patient is a 80 y.o. male in no acute distress. He is alert and oriented to person, place, and time. History  2015 Difficulty voiding after open heart surgery.  ml after not voiding for 2.5 hours. We started Flomax and added Proscar. Encouraged double voiding for incomplete bladder emptying. PVR 70ml-360ml     10/2019 Increased Flomax to twice per day. Continued Proscar. Prior to his next visit we will check a BMP and renal ultrasound. Did explain to the patient and his son that we are trying to prevent worsening renal function, we will likely not be successful in improving the renal function. Renal function from 7/2019 reviewed: BUN 23, creatinine 2.04, GFR 31. Patient is on aspirin 325 mg daily. He does have an appointment next month with his cardiologist.  I am not sure if it would be beneficial to decrease the dose to 81 mg. We will have the patient back in 4 to 6 weeks to review his renal function, ultrasound and postvoid residual.  We may consider proceeding with cystoscopy, or continued observation. Patient does have a long history of incomplete bladder emptying. 11/2019 Flomax back to once a day         Currently  Patient is here today for 6-month follow-up. Patient is taking Flomax twice daily. Patient did have recent lab work done. This is done by primary care. Patient did have an elevation in his BUN and creatinine. Current creatinine was 2.25. This is only slightly above where his baseline is. Patient feels he is doing well. He has increased his water intake. Continues to use MiraLAX couple times a week. He is having a bowel movement almost every day. He is having nocturia x3 and does go 2-3 times during the day. No current nausea vomiting or flank pain. He was unable to void at our office today. No abdominal pain. He is not uncomfortable. His random bladder scan was 850 mL today.   Past Medical History:   Diagnosis Date    Acute renal insufficiency 10/5/2015    Anxiety 10/14/2015    ASHD (arteriosclerotic heart disease) 10/14/2015    Bipolar disorder ([facility] [address] 1976    Essential hypertension 9/23/2016    Hyperlipidemia     Hypothyroidism     Localized osteoarthrosis, lower leg 10/14/2015    Parkinson's disease ([facility] [address]     light, 2010     Past Surgical History:   Procedure Laterality Date    CORONARY ARTERY BYPASS GRAFT  08/21/2015     X3 W/ LIMA TO LAD    DIAGNOSTIC CARDIAC CATH LAB PROCEDURE  08/17/15    OTHER SURGICAL HISTORY  08/21/2015    endovascular vein harvesting    OTHER SURGICAL HISTORY  08/21/2015     normothermic cardiopulmonary bypass w/ cardioplegic arrest of the heart    OTHER SURGICAL HISTORY  1994    gallstones removed, Lap     Outpatient Encounter Medications as of 12/19/2022   Medication Sig Dispense Refill    levothyroxine (SYNTHROID) 88 MCG tablet Take 1 tablet by mouth daily 90 tablet 3    buPROPion (WELLBUTRIN SR) 100 MG extended release tablet TAKE 1 TABLET BY MOUTH ONCE A DAY 90 tablet 3    finasteride (PROSCAR) 5 MG tablet TAKE 1 TABLET BY MOUTH ONCE A DAY 90 tablet 0    tamsulosin (FLOMAX) 0.4 MG capsule TAKE 1 CAPSULE BY MOUTH DAILY 90 capsule 0    albuterol sulfate HFA (VENTOLIN HFA) 108 (90 Base) MCG/ACT inhaler Inhale 2 puffs into the lungs every 4 hours as needed for Wheezing or Shortness of Breath 18 g 2    polyethylene glycol (GLYCOLAX) 17 GM/SCOOP powder Take 17 g by mouth daily      aspirin 81 MG tablet Take 81 mg by mouth daily      Multiple Vitamin (MULTI VITAMIN DAILY PO) Take by mouth daily      vitamin C (ASCORBIC ACID) 500 MG tablet Take 1,000 mg by mouth 2 times daily       atorvastatin (LIPITOR) 20 MG tablet daily       Omega-3 Fatty Acids (FISH OIL) 1000 MG CAPS Take 1,000 mg by mouth 2 times daily        No facility-administered encounter medications on file as of 12/19/2022.       Current Outpatient Medications on File Prior to Visit   Medication Sig Dispense Refill    levothyroxine (SYNTHROID) 88 MCG tablet Take 1 tablet by mouth daily 90 tablet 3    buPROPion (WELLBUTRIN SR) 100 MG extended release tablet TAKE 1 TABLET BY MOUTH ONCE A DAY 90 tablet 3    finasteride (PROSCAR) 5 MG tablet TAKE 1 TABLET BY MOUTH ONCE A DAY 90 tablet 0    tamsulosin (FLOMAX) 0.4 MG capsule TAKE 1 CAPSULE BY MOUTH DAILY 90 capsule 0    albuterol sulfate HFA (VENTOLIN HFA) 108 (90 Base) MCG/ACT inhaler Inhale 2 puffs into the lungs every 4 hours as needed for Wheezing or Shortness of Breath 18 g 2    polyethylene glycol (GLYCOLAX) 17 GM/SCOOP powder Take 17 g by mouth daily      aspirin 81 MG tablet Take 81 mg by mouth daily      Multiple Vitamin (MULTI VITAMIN DAILY PO) Take by mouth daily      vitamin C (ASCORBIC ACID) 500 MG tablet Take 1,000 mg by mouth 2 times daily       atorvastatin (LIPITOR) 20 MG tablet daily       Omega-3 Fatty Acids (FISH OIL) 1000 MG CAPS Take 1,000 mg by mouth 2 times daily        No current facility-administered medications on file prior to visit. Patient has no known allergies. Family History   Problem Relation Age of Onset    Depression Mother     Heart Attack Mother     Breast Cancer Sister      Social History     Tobacco Use   Smoking Status Former    Types: Cigarettes    Quit date: 1978    Years since quittin.9   Smokeless Tobacco Never       Social History     Substance and Sexual Activity   Alcohol Use No       Review of Systems   Constitutional:  Negative for appetite change, chills and fever. Eyes:  Negative for redness and visual disturbance. Respiratory:  Negative for cough, shortness of breath and wheezing. Cardiovascular:  Negative for chest pain and leg swelling. Gastrointestinal:  Negative for abdominal pain, constipation, nausea and vomiting. Genitourinary:  Negative for decreased urine volume, difficulty urinating, dysuria, enuresis, flank pain, frequency, hematuria, penile discharge, penile pain, scrotal swelling, testicular pain and urgency.    Musculoskeletal: Negative for back pain, joint swelling and myalgias. Skin:  Negative for color change, rash and wound. Neurological:  Negative for dizziness, tremors and numbness. Hematological:  Negative for adenopathy. Does not bruise/bleed easily. Temp 97.8 °F (36.6 °C)   Ht 5' 7\" (1.702 m)   Wt 177 lb (80.3 kg)   BMI 27.72 kg/m²       PHYSICAL EXAM:  Constitutional: Patient in no acute distress; Neuro: alert and oriented to person place and time. Psych: Mood and affect normal.  Skin: Normal  Lungs: Respiratory effort normal  Cardiovascular:  Normal peripheral pulses  Abdomen: Soft, non-tender, non-distended with no CVA, flank pain  Bladder non-tender and not distended. Lymphatics: no palpable lymphadenopathy  Penis normal  Urethral meatus normal  Scrotal exam normal  Testicles normal bilaterally  Epididymis normal bilaterally  No evidence of inguinal hernia    Lab Results   Component Value Date    BUN 35 (H) 11/15/2022     Lab Results   Component Value Date    CREATININE 2.25 (H) 11/15/2022     Lab Results   Component Value Date    PSA 1.45 11/21/2013       ASSESSMENT:  This is a 80 y.o. male with the following diagnoses:   Diagnosis Orders   1. Benign prostatic hyperplasia with urinary retention        2. Incomplete bladder emptying             PLAN:  Patient's PVR was elevated today at 850. He had not voided for several hours. He has been increasing his water intake for primary care. He is due for repeat lab work this week. If his labs are stable we will see him back in 3 months otherwise we will see him sooner.

## 2022-12-21 ENCOUNTER — HOSPITAL ENCOUNTER (OUTPATIENT)
Age: 87
Discharge: HOME | End: 2022-12-21
Payer: MEDICARE

## 2022-12-21 DIAGNOSIS — N18.30 STAGE 3 CHRONIC KIDNEY DISEASE, UNSPECIFIED WHETHER STAGE 3A OR 3B CKD (HCC): ICD-10-CM

## 2022-12-21 LAB
ANION GAP SERPL CALCULATED.3IONS-SCNC: 9 MMOL/L (ref 9–17)
BUN BLDV-MCNC: 32 MG/DL (ref 8–23)
BUN/CREAT BLD: 16 (ref 9–20)
CALCIUM SERPL-MCNC: 10.2 MG/DL (ref 8.6–10.4)
CHLORIDE BLD-SCNC: 108 MMOL/L (ref 98–107)
CO2: 25 MMOL/L (ref 20–31)
CREAT SERPL-MCNC: 1.98 MG/DL (ref 0.7–1.2)
GFR SERPL CREATININE-BSD FRML MDRD: 32 ML/MIN/1.73M2
GLUCOSE BLD-MCNC: 104 MG/DL (ref 70–99)
POTASSIUM SERPL-SCNC: 5.1 MMOL/L (ref 3.7–5.3)
SODIUM BLD-SCNC: 142 MMOL/L (ref 135–144)

## 2022-12-21 PROCEDURE — 36415 COLL VENOUS BLD VENIPUNCTURE: CPT

## 2022-12-21 PROCEDURE — 80048 BASIC METABOLIC PNL TOTAL CA: CPT

## 2023-02-15 ENCOUNTER — HOSPITAL ENCOUNTER (EMERGENCY)
Age: 88
Discharge: HOME | End: 2023-02-15
Payer: MEDICARE

## 2023-02-15 ENCOUNTER — APPOINTMENT (OUTPATIENT)
Dept: GENERAL RADIOLOGY | Age: 88
End: 2023-02-15
Payer: MEDICARE

## 2023-02-15 VITALS
HEART RATE: 102 BPM | OXYGEN SATURATION: 91 % | SYSTOLIC BLOOD PRESSURE: 132 MMHG | TEMPERATURE: 98.6 F | RESPIRATION RATE: 16 BRPM | DIASTOLIC BLOOD PRESSURE: 81 MMHG

## 2023-02-15 DIAGNOSIS — S61.411A SKIN TEAR OF RIGHT HAND WITHOUT COMPLICATION, INITIAL ENCOUNTER: Primary | ICD-10-CM

## 2023-02-15 PROCEDURE — 73130 X-RAY EXAM OF HAND: CPT

## 2023-02-15 PROCEDURE — 99283 EMERGENCY DEPT VISIT LOW MDM: CPT

## 2023-02-15 ASSESSMENT — PAIN - FUNCTIONAL ASSESSMENT: PAIN_FUNCTIONAL_ASSESSMENT: NONE - DENIES PAIN

## 2023-02-15 NOTE — ED PROVIDER NOTES
[address]      Pt Name: Vern Castro  MRN: [id number]  [place] 9/12/1934  Date of evaluation: 2/15/2023  Provider: Simona George PA-C    CHIEF COMPLAINT       Chief Complaint   Patient presents with    Other     Skin tear to right hand. Pt tripped over curb and hit hand. Did not hit head. Denies any pain. HISTORY OF PRESENT ILLNESS      Vern Castro is a 80 y.o. male who presents to the emergency department via EMS after trip and fall today while walking to the [facility]. Patient excellently tripped on the curb falling hitting his right hand causing skin tear to the top of his right hand. He denies any pain, denies any head injury, denies any loss of consciousness, denies any chest pain or shortness of breath. States it was a misstep.   He has had many skin tears in the past.        REVIEW OF SYSTEMS       Review of Systems   AS STATED IN HPI      PAST MEDICAL HISTORY     Past Medical History:   Diagnosis Date    Acute renal insufficiency 10/5/2015    Anxiety 10/14/2015    ASHD (arteriosclerotic heart disease) 10/14/2015    Bipolar disorder ([facility] [address] 1976    Essential hypertension 9/23/2016    Hyperlipidemia     Hypothyroidism     Localized osteoarthrosis, lower leg 10/14/2015    Parkinson's disease ([facility] [address]     light, 2010         SURGICAL HISTORY       Past Surgical History:   Procedure Laterality Date    CORONARY ARTERY BYPASS GRAFT  08/21/2015     X3 W/ LIMA TO LAD    DIAGNOSTIC CARDIAC CATH LAB PROCEDURE  08/17/15    OTHER SURGICAL HISTORY  08/21/2015    endovascular vein harvesting    OTHER SURGICAL HISTORY  08/21/2015     normothermic cardiopulmonary bypass w/ cardioplegic arrest of the heart    OTHER SURGICAL HISTORY  1994    gallstones removed, Lap         CURRENT MEDICATIONS       Previous Medications    ALBUTEROL SULFATE HFA (VENTOLIN HFA) 108 (90 BASE) MCG/ACT INHALER    Inhale 2 puffs into the lungs every 4 hours as needed for Wheezing or Shortness of Breath    ASPIRIN 81 MG TABLET    Take 81 mg by mouth daily    ATORVASTATIN (LIPITOR) 20 MG TABLET    daily     BUPROPION (WELLBUTRIN SR) 100 MG EXTENDED RELEASE TABLET    TAKE 1 TABLET BY MOUTH ONCE A DAY    FINASTERIDE (PROSCAR) 5 MG TABLET    TAKE 1 TABLET BY MOUTH ONCE A DAY    LEVOTHYROXINE (SYNTHROID) 88 MCG TABLET    Take 1 tablet by mouth daily    MULTIPLE VITAMIN (MULTI VITAMIN DAILY PO)    Take by mouth daily    OMEGA-3 FATTY ACIDS (FISH OIL) 1000 MG CAPS    Take 1,000 mg by mouth 2 times daily     POLYETHYLENE GLYCOL (GLYCOLAX) 17 GM/SCOOP POWDER    Take 17 g by mouth daily    TAMSULOSIN (FLOMAX) 0.4 MG CAPSULE    TAKE 1 CAPSULE BY MOUTH DAILY    VITAMIN C (ASCORBIC ACID) 500 MG TABLET    Take 1,000 mg by mouth 2 times daily        ALLERGIES       Patient has no known allergies. FAMILY HISTORY       Family History   Problem Relation Age of Onset    Depression Mother     Heart Attack Mother     Breast Cancer Sister           SOCIAL HISTORY       Social History     Tobacco Use    Smoking status: Former     Types: Cigarettes     Quit date: 1978     Years since quittin.1    Smokeless tobacco: Never   Vaping Use    Vaping Use: Never used   Substance Use Topics    Alcohol use: No         PHYSICAL EXAM       ED Triage Vitals [02/15/23 1129]   BP Temp Temp Source Heart Rate Resp SpO2 Height Weight   132/81 98.6 °F (37 °C) Oral (!) 102 16 91 % -- --       Physical Exam   Nursing note and vitals reviewed. Constitutional: Oriented to person, place, and time and well-developed, well-nourished. Head: Normocephalic and atraumatic. Ear: External ears normal.   Nose: Nose normal and midline. Eyes: Conjunctivae and EOM are normal. Pupils are equal, round, and reactive to light. Neck: Normal range of motion. Neurological: Alert and oriented to person, place, and time. GCS score is 15.    Skin: There is a L-shaped skin tear to the dorsal right hand, there is no active bleeding at this time.  Total length about 4 and half centimeters. Psychiatric: Mood, memory, affect and judgment normal.       All other labs were within normal range or not returned as of this dictation. EMERGENCY DEPARTMENT COURSE and DIFFERENTIAL DIAGNOSIS/MDM:     PROCEDURES:  Unless otherwise noted below, none     Procedures    1)  Number and Complexity of Problems  Problem List This Visit:   Chief Complaint   Patient presents with    Other     Skin tear to right hand. Pt tripped over curb and hit hand. Did not hit head. Denies any pain. 2)  Data Reviewed      LABS:  Labs Reviewed - No data to display    RADIOLOGY:   All plain film, CT, MRI, and formal ultrasound images (except ED bedside ultrasound) are read by the radiologist  XR HAND RIGHT (MIN 3 VIEWS)   Preliminary Result   No acute fracture or dislocation. Polyarticular osteoarthritis. Marginal erosions are seen at several finger is as well as the ulnar styloid,   raising the possibility of inflammatory arthritis, especially rheumatoid   arthritis. Multiple metallic foreign bodies are seen in the thumb and the right index   finger. 3)  Treatment and Disposition    Patient repeat assessment:  stable      Patient presents today after misstep and a fall causing skin tear to the right hand. The wound was cleansed and repaired with nonstick dressing. No sutures indicated due to thin skin. X-ray was reviewed showed no acute fractures  Instructed to keep wound clean and dry and follow-up with family doctor within the next week. Disposition discussion with patient/family:  Patient instructed to return to the emergency room if symptoms worsen, return, or any other concern right away which is agreed by the patient.  Discussed close follow up with pcp    Shared Decision Making:      PATIENT REFERRED TO:  Radha Miguel MD  [address] [place] [address]  [phone]    Schedule an appointment as soon as possible for a visit       [facility] ED  [address] [facility]  [phone]    For worsening symptoms, or any other concern    DISCHARGE MEDICATIONS:  New Prescriptions    No medications on file            FINAL IMPRESSION      1.  Skin tear of right hand without complication, initial encounter          DISPOSITION/PLAN     DISPOSITION Decision To Discharge 02/15/2023 12:34:33 PM        (Please note that portions of this note were completed with a voice recognition program.  Efforts were made to edit the dictations but occasionally words are mis-transcribed.)    Raz Goodman PA-C (electronically signed)       Raz Goodman PA-C  02/15/23 [id number]       Raz Goodman PA-C  02/18/23 [id number]

## 2023-02-28 ENCOUNTER — HOSPITAL ENCOUNTER (OUTPATIENT)
Age: 88
Discharge: HOME | End: 2023-02-28
Payer: MEDICARE

## 2023-02-28 DIAGNOSIS — I10 ESSENTIAL HYPERTENSION: ICD-10-CM

## 2023-02-28 DIAGNOSIS — E78.5 HYPERLIPIDEMIA, UNSPECIFIED HYPERLIPIDEMIA TYPE: ICD-10-CM

## 2023-02-28 DIAGNOSIS — E03.9 HYPOTHYROIDISM, UNSPECIFIED TYPE: ICD-10-CM

## 2023-02-28 DIAGNOSIS — R73.9 HYPERGLYCEMIA: ICD-10-CM

## 2023-02-28 LAB
ALBUMIN SERPL-MCNC: 3.4 G/DL (ref 3.5–5.2)
ALBUMIN/GLOBULIN RATIO: 0.9 (ref 1–2.5)
ALP SERPL-CCNC: 127 U/L (ref 40–129)
ALT SERPL-CCNC: 10 U/L (ref 5–41)
ANION GAP SERPL CALCULATED.3IONS-SCNC: 10 MMOL/L (ref 9–17)
AST SERPL-CCNC: 13 U/L
BILIRUB SERPL-MCNC: 0.4 MG/DL (ref 0.3–1.2)
BUN SERPL-MCNC: 31 MG/DL (ref 8–23)
BUN/CREAT BLD: 14 (ref 9–20)
CALCIUM SERPL-MCNC: 9.5 MG/DL (ref 8.6–10.4)
CHLORIDE SERPL-SCNC: 109 MMOL/L (ref 98–107)
CHOLEST SERPL-MCNC: 108 MG/DL
CHOLESTEROL/HDL RATIO: 3.3
CO2 SERPL-SCNC: 25 MMOL/L (ref 20–31)
CREAT SERPL-MCNC: 2.22 MG/DL (ref 0.7–1.2)
GFR SERPL CREATININE-BSD FRML MDRD: 28 ML/MIN/1.73M2
GLUCOSE SERPL-MCNC: 98 MG/DL (ref 70–99)
HCT VFR BLD AUTO: 46.7 % (ref 40.7–50.3)
HDLC SERPL-MCNC: 33 MG/DL
HGB BLD-MCNC: 15.3 G/DL (ref 13–17)
LDLC SERPL CALC-MCNC: 59 MG/DL (ref 0–130)
MCH RBC QN AUTO: 34.5 PG (ref 25.2–33.5)
MCHC RBC AUTO-ENTMCNC: 32.8 G/DL (ref 28.4–34.8)
MCV RBC AUTO: 105.4 FL (ref 82.6–102.9)
NRBC AUTOMATED: 0 PER 100 WBC
PDW BLD-RTO: 12.9 % (ref 11.8–14.4)
PLATELET # BLD AUTO: 311 K/UL (ref 138–453)
PMV BLD AUTO: 10.7 FL (ref 8.1–13.5)
POTASSIUM SERPL-SCNC: 5 MMOL/L (ref 3.7–5.3)
PROT SERPL-MCNC: 7.1 G/DL (ref 6.4–8.3)
RBC # BLD: 4.43 M/UL (ref 4.21–5.77)
SODIUM SERPL-SCNC: 144 MMOL/L (ref 135–144)
TRIGL SERPL-MCNC: 82 MG/DL
TSH SERPL-ACNC: 0.83 UIU/ML (ref 0.3–5)
WBC # BLD AUTO: 9.2 K/UL (ref 3.5–11.3)

## 2023-02-28 PROCEDURE — 85027 COMPLETE CBC AUTOMATED: CPT

## 2023-02-28 PROCEDURE — 80061 LIPID PANEL: CPT

## 2023-02-28 PROCEDURE — 36415 COLL VENOUS BLD VENIPUNCTURE: CPT

## 2023-02-28 PROCEDURE — 84443 ASSAY THYROID STIM HORMONE: CPT

## 2023-02-28 PROCEDURE — 80053 COMPREHEN METABOLIC PANEL: CPT

## 2023-02-28 PROCEDURE — 83036 HEMOGLOBIN GLYCOSYLATED A1C: CPT

## 2023-02-28 PROCEDURE — 84439 ASSAY OF FREE THYROXINE: CPT

## 2023-03-01 LAB
EST. AVERAGE GLUCOSE BLD GHB EST-MCNC: 117 MG/DL
HBA1C MFR BLD: 5.7 % (ref 4–6)
T4 FREE SERPL-MCNC: 1.22 NG/DL (ref 0.93–1.7)

## 2023-03-10 ENCOUNTER — HOSPITAL ENCOUNTER (OUTPATIENT)
Dept: NON INVASIVE DIAGNOSTICS | Age: 88
Discharge: HOME | End: 2023-03-10
Payer: MEDICARE

## 2023-03-10 DIAGNOSIS — I95.9 HYPOTENSION, UNSPECIFIED HYPOTENSION TYPE: ICD-10-CM

## 2023-03-10 DIAGNOSIS — I49.9 IRREGULAR HEART BEAT: ICD-10-CM

## 2023-03-10 DIAGNOSIS — R55 PRE-SYNCOPE: ICD-10-CM

## 2023-03-10 LAB
LV EF: 55 %
LVEF MODALITY: NORMAL

## 2023-03-10 PROCEDURE — 93225 XTRNL ECG REC<48 HRS REC: CPT

## 2023-03-10 PROCEDURE — 93306 TTE W/DOPPLER COMPLETE: CPT

## 2023-03-10 PROCEDURE — 93226 XTRNL ECG REC<48 HR SCAN A/R: CPT

## 2023-03-10 NOTE — PROGRESS NOTES
Frequent PVCs and bigeminy were seen throughout the echo. The patient's son states that there is no known history of PVCs and that the patient has had multiple episodes of syncope and falls. I called Dr. Cindy Torres office at 10:40 am to see if Dr. Andrés Lewis would be ordering a heart monitor and offered to do the heart monitor today as a convenience to the patient and his son. Filipe Harvey checked with Dr. Andrés Lewis and a 48 Holter monitor was ordered. Holter monitor and diary instructions were giving to the patient and his son.

## 2023-03-27 ENCOUNTER — OFFICE VISIT (OUTPATIENT)
Dept: UROLOGY | Age: 88
End: 2023-03-27
Payer: MEDICARE

## 2023-03-27 VITALS
BODY MASS INDEX: 28.06 KG/M2 | WEIGHT: 178.8 LBS | HEART RATE: 73 BPM | TEMPERATURE: 97.1 F | DIASTOLIC BLOOD PRESSURE: 59 MMHG | SYSTOLIC BLOOD PRESSURE: 110 MMHG | HEIGHT: 67 IN

## 2023-03-27 DIAGNOSIS — N13.8 BPH WITH OBSTRUCTION/LOWER URINARY TRACT SYMPTOMS: ICD-10-CM

## 2023-03-27 DIAGNOSIS — N40.1 BPH WITH OBSTRUCTION/LOWER URINARY TRACT SYMPTOMS: ICD-10-CM

## 2023-03-27 DIAGNOSIS — R33.9 INCOMPLETE BLADDER EMPTYING: Primary | ICD-10-CM

## 2023-03-27 DIAGNOSIS — N18.31 CHRONIC RENAL FAILURE, STAGE 3A (HCC): ICD-10-CM

## 2023-03-27 PROCEDURE — 51798 US URINE CAPACITY MEASURE: CPT | Performed by: NURSE PRACTITIONER

## 2023-03-27 PROCEDURE — G8417 CALC BMI ABV UP PARAM F/U: HCPCS | Performed by: NURSE PRACTITIONER

## 2023-03-27 PROCEDURE — PBSHW PR MEAS POST-VOIDING RESIDUAL URINE&/BLADDER CAP: Performed by: NURSE PRACTITIONER

## 2023-03-27 PROCEDURE — G8427 DOCREV CUR MEDS BY ELIG CLIN: HCPCS | Performed by: NURSE PRACTITIONER

## 2023-03-27 PROCEDURE — 1036F TOBACCO NON-USER: CPT | Performed by: NURSE PRACTITIONER

## 2023-03-27 PROCEDURE — G8484 FLU IMMUNIZE NO ADMIN: HCPCS | Performed by: NURSE PRACTITIONER

## 2023-03-27 PROCEDURE — 99213 OFFICE O/P EST LOW 20 MIN: CPT | Performed by: NURSE PRACTITIONER

## 2023-03-27 PROCEDURE — 1123F ACP DISCUSS/DSCN MKR DOCD: CPT | Performed by: NURSE PRACTITIONER

## 2023-03-27 RX ORDER — AMIODARONE HYDROCHLORIDE 200 MG/1
TABLET ORAL
COMMUNITY
Start: 2023-03-17

## 2023-03-27 RX ORDER — FINASTERIDE 5 MG/1
5 TABLET, FILM COATED ORAL DAILY
Qty: 90 TABLET | Refills: 3 | Status: SHIPPED | OUTPATIENT
Start: 2023-03-27

## 2023-03-27 RX ORDER — TAMSULOSIN HYDROCHLORIDE 0.4 MG/1
0.4 CAPSULE ORAL DAILY
Qty: 90 CAPSULE | Refills: 3 | Status: SHIPPED | OUTPATIENT
Start: 2023-03-27

## 2023-03-27 ASSESSMENT — ENCOUNTER SYMPTOMS
CONSTIPATION: 0
BACK PAIN: 0
COLOR CHANGE: 0
NAUSEA: 0
WHEEZING: 0
VOMITING: 0
ABDOMINAL PAIN: 0
EYE REDNESS: 0
COUGH: 0
SHORTNESS OF BREATH: 0

## 2023-03-27 NOTE — PROGRESS NOTES
Random bladder scan.  Patient voided 1 hour before visit  PVR -551ml
Vitamin (MULTI VITAMIN DAILY PO) Take by mouth daily      vitamin C (ASCORBIC ACID) 500 MG tablet Take 1,000 mg by mouth 2 times daily       atorvastatin (LIPITOR) 20 MG tablet daily       Omega-3 Fatty Acids (FISH OIL) 1000 MG CAPS Take 1,000 mg by mouth 2 times daily       albuterol sulfate HFA (VENTOLIN HFA) 108 (90 Base) MCG/ACT inhaler Inhale 2 puffs into the lungs every 4 hours as needed for Wheezing or Shortness of Breath (Patient not taking: Reported on 3/27/2023) 18 g 2     No current facility-administered medications on file prior to visit. Patient has no known allergies. Family History   Problem Relation Age of Onset    Depression Mother     Heart Attack Mother     Breast Cancer Sister      Social History     Tobacco Use   Smoking Status Former    Types: Cigarettes    Quit date: 1978    Years since quittin.2   Smokeless Tobacco Never       Social History     Substance and Sexual Activity   Alcohol Use No       Review of Systems   Constitutional:  Negative for appetite change, chills and fever. Eyes:  Negative for redness and visual disturbance. Respiratory:  Negative for cough, shortness of breath and wheezing. Cardiovascular:  Negative for chest pain and leg swelling. Gastrointestinal:  Negative for abdominal pain, constipation, nausea and vomiting. Genitourinary:  Negative for decreased urine volume, difficulty urinating, dysuria, enuresis, flank pain, frequency, hematuria, penile discharge, penile pain, scrotal swelling, testicular pain and urgency. Musculoskeletal:  Negative for back pain, joint swelling and myalgias. Skin:  Negative for color change, rash and wound. Neurological:  Negative for dizziness, tremors and numbness. Hematological:  Negative for adenopathy. Does not bruise/bleed easily.      BP (!) 110/59 (Site: Left Upper Arm, Position: Sitting, Cuff Size: Large Adult)   Pulse 73   Temp 97.1 °F (36.2 °C)   Ht 5' 7\" (1.702 m)   Wt 178 lb 12.8 oz (81.1 kg)

## 2023-03-27 NOTE — PATIENT INSTRUCTIONS
SURVEY:    You may be receiving a survey from [employer] regarding your visit today. Please complete the survey to enable us to provide the highest quality of care to you and your family. If you cannot score us a very good on any question, please call the office to discuss how we could have made your experience a very good one. Thank you.

## 2023-05-25 ENCOUNTER — HOSPITAL ENCOUNTER (OUTPATIENT)
Age: 88
Setting detail: SPECIMEN
Discharge: HOME | End: 2023-05-25

## 2023-05-25 DIAGNOSIS — E03.9 HYPOTHYROIDISM, UNSPECIFIED TYPE: ICD-10-CM

## 2023-05-25 DIAGNOSIS — Z79.899 HIGH RISK MEDICATION USE: ICD-10-CM

## 2023-05-25 LAB
ALBUMIN SERPL-MCNC: 3.5 G/DL (ref 3.5–5.2)
ALBUMIN/GLOB SERPL: 1.2 {RATIO} (ref 1–2.5)
ALP SERPL-CCNC: 108 U/L (ref 40–129)
ALT SERPL-CCNC: 19 U/L (ref 5–41)
ANION GAP SERPL CALCULATED.3IONS-SCNC: 11 MMOL/L (ref 9–17)
AST SERPL-CCNC: 19 U/L
BASOPHILS # BLD: 0.06 K/UL (ref 0–0.2)
BASOPHILS NFR BLD: 1 % (ref 0–2)
BILIRUB SERPL-MCNC: 0.5 MG/DL (ref 0.3–1.2)
BUN SERPL-MCNC: 32 MG/DL (ref 8–23)
CALCIUM SERPL-MCNC: 9.4 MG/DL (ref 8.6–10.4)
CHLORIDE SERPL-SCNC: 107 MMOL/L (ref 98–107)
CO2 SERPL-SCNC: 24 MMOL/L (ref 20–31)
CREAT SERPL-MCNC: 2.39 MG/DL (ref 0.7–1.2)
EOSINOPHIL # BLD: 0.56 K/UL (ref 0–0.44)
EOSINOPHILS RELATIVE PERCENT: 9 % (ref 1–4)
ERYTHROCYTE [DISTWIDTH] IN BLOOD BY AUTOMATED COUNT: 13.1 % (ref 11.8–14.4)
GFR SERPL CREATININE-BSD FRML MDRD: 25 ML/MIN/1.73M2
GLUCOSE SERPL-MCNC: 116 MG/DL (ref 70–99)
HCT VFR BLD AUTO: 45.2 % (ref 40.7–50.3)
HGB BLD-MCNC: 14.5 G/DL (ref 13–17)
IMM GRANULOCYTES # BLD AUTO: <0.03 K/UL (ref 0–0.3)
IMM GRANULOCYTES NFR BLD: 0 %
LYMPHOCYTES # BLD: 32 % (ref 24–43)
LYMPHOCYTES NFR BLD: 2.1 K/UL (ref 1.1–3.7)
MCH RBC QN AUTO: 33.6 PG (ref 25.2–33.5)
MCHC RBC AUTO-ENTMCNC: 32.1 G/DL (ref 28.4–34.8)
MCV RBC AUTO: 104.6 FL (ref 82.6–102.9)
MONOCYTES NFR BLD: 0.63 K/UL (ref 0.1–1.2)
MONOCYTES NFR BLD: 10 % (ref 3–12)
NEUTROPHILS NFR BLD: 48 % (ref 36–65)
NEUTS SEG NFR BLD: 3.15 K/UL (ref 1.5–8.1)
NRBC AUTOMATED: 0 PER 100 WBC
PLATELET # BLD AUTO: 249 K/UL (ref 138–453)
PMV BLD AUTO: 11.4 FL (ref 8.1–13.5)
POTASSIUM SERPL-SCNC: 4.5 MMOL/L (ref 3.7–5.3)
PROT SERPL-MCNC: 6.5 G/DL (ref 6.4–8.3)
RBC # BLD AUTO: 4.32 M/UL (ref 4.21–5.77)
RBC # BLD: ABNORMAL 10*6/UL
SODIUM SERPL-SCNC: 142 MMOL/L (ref 135–144)
TSH SERPL-MCNC: 1.4 UIU/ML (ref 0.3–5)
WBC OTHER # BLD: 6.5 K/UL (ref 3.5–11.3)

## 2023-06-08 ENCOUNTER — HOSPITAL ENCOUNTER (OUTPATIENT)
Age: 88
Setting detail: SPECIMEN
Discharge: HOME | End: 2023-06-08

## 2023-06-08 DIAGNOSIS — N18.30 STAGE 3 CHRONIC KIDNEY DISEASE, UNSPECIFIED WHETHER STAGE 3A OR 3B CKD (HCC): ICD-10-CM

## 2023-06-08 DIAGNOSIS — N18.4 CKD (CHRONIC KIDNEY DISEASE) STAGE 4, GFR 15-29 ML/MIN (HCC): ICD-10-CM

## 2023-06-08 LAB
ANION GAP SERPL CALCULATED.3IONS-SCNC: 17 MMOL/L (ref 9–17)
BILIRUB UR QL STRIP: NEGATIVE
BUN SERPL-MCNC: 27 MG/DL (ref 8–23)
CALCIUM SERPL-MCNC: 9.6 MG/DL (ref 8.6–10.4)
CHLORIDE SERPL-SCNC: 110 MMOL/L (ref 98–107)
CLARITY UR: CLEAR
CO2 SERPL-SCNC: 19 MMOL/L (ref 20–31)
COLOR UR: YELLOW
COMMENT UA: NORMAL
CREAT SERPL-MCNC: 2.36 MG/DL (ref 0.7–1.2)
GFR SERPL CREATININE-BSD FRML MDRD: 26 ML/MIN/1.73M2
GLUCOSE SERPL-MCNC: 105 MG/DL (ref 70–99)
GLUCOSE UR STRIP-MCNC: NEGATIVE MG/DL
HGB UR QL STRIP.AUTO: NEGATIVE
KETONES UR STRIP-MCNC: NEGATIVE MG/DL
LEUKOCYTE ESTERASE UR QL STRIP: NEGATIVE
NITRITE UR QL STRIP: NEGATIVE
PH UR STRIP: 7 [PH] (ref 5–8)
POTASSIUM SERPL-SCNC: 4.4 MMOL/L (ref 3.7–5.3)
PROT UR STRIP-MCNC: NEGATIVE MG/DL
SODIUM SERPL-SCNC: 146 MMOL/L (ref 135–144)
SP GR UR STRIP: 1.01 (ref 1–1.03)
UROBILINOGEN UR STRIP-ACNC: NORMAL

## 2023-07-17 ENCOUNTER — HOSPITAL ENCOUNTER (EMERGENCY)
Age: 88
Discharge: HOME | End: 2023-07-17
Payer: MEDICARE

## 2023-07-17 ENCOUNTER — APPOINTMENT (OUTPATIENT)
Dept: CT IMAGING | Age: 88
End: 2023-07-17
Payer: MEDICARE

## 2023-07-17 VITALS
SYSTOLIC BLOOD PRESSURE: 147 MMHG | HEART RATE: 51 BPM | DIASTOLIC BLOOD PRESSURE: 60 MMHG | RESPIRATION RATE: 20 BRPM | OXYGEN SATURATION: 94 % | TEMPERATURE: 98.3 F

## 2023-07-17 DIAGNOSIS — R42 DIZZINESS: Primary | ICD-10-CM

## 2023-07-17 LAB
ANION GAP SERPL CALCULATED.3IONS-SCNC: 10 MMOL/L (ref 9–17)
BASOPHILS # BLD: 0.05 K/UL (ref 0–0.2)
BASOPHILS NFR BLD: 1 % (ref 0–2)
BILIRUB UR QL STRIP: NEGATIVE
BUN SERPL-MCNC: 28 MG/DL (ref 8–23)
BUN/CREAT SERPL: 13 (ref 9–20)
CALCIUM SERPL-MCNC: 9.1 MG/DL (ref 8.6–10.4)
CHLORIDE SERPL-SCNC: 108 MMOL/L (ref 98–107)
CLARITY UR: CLEAR
CO2 SERPL-SCNC: 24 MMOL/L (ref 20–31)
COLOR UR: YELLOW
CREAT SERPL-MCNC: 2.1 MG/DL (ref 0.7–1.2)
EOSINOPHIL # BLD: 0.37 K/UL (ref 0–0.44)
EOSINOPHILS RELATIVE PERCENT: 4 % (ref 1–4)
EPI CELLS #/AREA URNS HPF: NORMAL /HPF (ref 0–5)
ERYTHROCYTE [DISTWIDTH] IN BLOOD BY AUTOMATED COUNT: 13.2 % (ref 11.8–14.4)
GFR SERPL CREATININE-BSD FRML MDRD: 30 ML/MIN/1.73M2
GLUCOSE SERPL-MCNC: 109 MG/DL (ref 70–99)
GLUCOSE UR STRIP-MCNC: NEGATIVE MG/DL
HCT VFR BLD AUTO: 43.8 % (ref 40.7–50.3)
HGB BLD-MCNC: 14.4 G/DL (ref 13–17)
HGB UR QL STRIP.AUTO: NEGATIVE
IMM GRANULOCYTES # BLD AUTO: 0.03 K/UL (ref 0–0.3)
IMM GRANULOCYTES NFR BLD: 0 %
KETONES UR STRIP-MCNC: NEGATIVE MG/DL
LEUKOCYTE ESTERASE UR QL STRIP: NEGATIVE
LYMPHOCYTES # BLD: 14 % (ref 24–43)
LYMPHOCYTES NFR BLD: 1.36 K/UL (ref 1.1–3.7)
MCH RBC QN AUTO: 33 PG (ref 25.2–33.5)
MCHC RBC AUTO-ENTMCNC: 32.9 G/DL (ref 28.4–34.8)
MCV RBC AUTO: 100.5 FL (ref 82.6–102.9)
MONOCYTES NFR BLD: 0.9 K/UL (ref 0.1–1.2)
MONOCYTES NFR BLD: 10 % (ref 3–12)
NEUTROPHILS NFR BLD: 71 % (ref 36–65)
NEUTS SEG NFR BLD: 6.74 K/UL (ref 1.5–8.1)
NITRITE UR QL STRIP: NEGATIVE
NRBC BLD-RTO: 0 PER 100 WBC
PH UR STRIP: 7 [PH] (ref 5–9)
PLATELET # BLD AUTO: 218 K/UL (ref 138–453)
PMV BLD AUTO: 11 FL (ref 8.1–13.5)
POTASSIUM SERPL-SCNC: 4.4 MMOL/L (ref 3.7–5.3)
PROT UR STRIP-MCNC: NEGATIVE MG/DL
RBC # BLD AUTO: 4.36 M/UL (ref 4.21–5.77)
RBC #/AREA URNS HPF: NORMAL /HPF (ref 0–2)
SODIUM SERPL-SCNC: 142 MMOL/L (ref 135–144)
SP GR UR STRIP: <1.005 (ref 1.01–1.02)
TROPONIN I SERPL HS-MCNC: 37 NG/L (ref 0–22)
TROPONIN I SERPL HS-MCNC: 40 NG/L (ref 0–22)
UROBILINOGEN UR STRIP-ACNC: NORMAL EU/DL (ref 0–1)
WBC #/AREA URNS HPF: NORMAL /HPF (ref 0–5)
WBC OTHER # BLD: 9.5 K/UL (ref 3.5–11.3)

## 2023-07-17 PROCEDURE — 2580000003 HC RX 258: Performed by: PHYSICIAN ASSISTANT

## 2023-07-17 PROCEDURE — 85027 COMPLETE CBC AUTOMATED: CPT

## 2023-07-17 PROCEDURE — 80048 BASIC METABOLIC PNL TOTAL CA: CPT

## 2023-07-17 PROCEDURE — 93005 ELECTROCARDIOGRAM TRACING: CPT | Performed by: EMERGENCY MEDICINE

## 2023-07-17 PROCEDURE — 36415 COLL VENOUS BLD VENIPUNCTURE: CPT

## 2023-07-17 PROCEDURE — 84484 ASSAY OF TROPONIN QUANT: CPT

## 2023-07-17 PROCEDURE — 99285 EMERGENCY DEPT VISIT HI MDM: CPT

## 2023-07-17 PROCEDURE — 70498 CT ANGIOGRAPHY NECK: CPT

## 2023-07-17 PROCEDURE — 6360000004 HC RX CONTRAST MEDICATION: Performed by: PHYSICIAN ASSISTANT

## 2023-07-17 PROCEDURE — 81001 URINALYSIS AUTO W/SCOPE: CPT

## 2023-07-17 RX ORDER — 0.9 % SODIUM CHLORIDE 0.9 %
1000 INTRAVENOUS SOLUTION INTRAVENOUS ONCE
Status: COMPLETED | OUTPATIENT
Start: 2023-07-17 | End: 2023-07-17

## 2023-07-17 RX ADMIN — IOPAMIDOL 75 ML: 755 INJECTION, SOLUTION INTRAVENOUS at 17:52

## 2023-07-17 RX ADMIN — SODIUM CHLORIDE 1000 ML: 9 INJECTION, SOLUTION INTRAVENOUS at 17:04

## 2023-07-17 ASSESSMENT — ENCOUNTER SYMPTOMS
BACK PAIN: 0
DIARRHEA: 0
COUGH: 0
VOMITING: 0
NAUSEA: 0
ABDOMINAL PAIN: 0
SHORTNESS OF BREATH: 0

## 2023-07-17 NOTE — ED PROVIDER NOTES
Creatinine 2.1 (*)     Est, Glom Filt Rate 30 (*)     Chloride 108 (*)     All other components within normal limits   URINALYSIS WITH REFLEX TO CULTURE - Abnormal; Notable for the following components:    Specific Gravity, UA <1.005 (*)     All other components within normal limits   TROPONIN - Abnormal; Notable for the following components:    Troponin, High Sensitivity 40 (*)     All other components within normal limits   TROPONIN - Abnormal; Notable for the following components:    Troponin, High Sensitivity 37 (*)     All other components within normal limits   MICROSCOPIC URINALYSIS     Reevaluation at 1930-patient resting comfortably in the bed. Asymptomatic. Discussed testing results with patient and family at bedside. Plan is disposition home. All other labs were within normal range or not returned as of this dictation. EMERGENCY DEPARTMENT COURSE and DIFFERENTIAL DIAGNOSIS/MDM:     Patient seen and evaluated in the emergency department with dizziness. Patient had no symptoms here. Patient has chronic dizziness and it has been intermittent. Patient is neurologically intact. CT angio head neck clear. Lab work baseline. Plan at this time is discharge home and outpatient follow-up. FINAL IMPRESSION      1.  Dizziness          DISPOSITION/PLAN     DISPOSITION Decision To Discharge 07/17/2023 07:52:32 PM      PATIENT REFERRED TO:  Lashanda Ga MD  [address] [place] [address] [address]  [phone]    Schedule an appointment as soon as possible for a visit         DISCHARGE MEDICATIONS:  New Prescriptions    No medications on file       (Please note that portions of this note were completed with a voice recognition program.  Efforts were made to edit the dictations but occasionally words are mis-transcribed.)    Angeles Heath PA-C (electronically signed)  Attending Emergency Physician           Angeles Heath PA-C  07/17/23 1953

## 2023-07-18 LAB
EKG ATRIAL RATE: 53 BPM
EKG P AXIS: 31 DEGREES
EKG P-R INTERVAL: 198 MS
EKG Q-T INTERVAL: 454 MS
EKG QRS DURATION: 92 MS
EKG QTC CALCULATION (BAZETT): 426 MS
EKG R AXIS: 2 DEGREES
EKG T AXIS: 99 DEGREES
EKG VENTRICULAR RATE: 53 BPM

## 2023-07-18 PROCEDURE — 93010 ELECTROCARDIOGRAM REPORT: CPT | Performed by: FAMILY MEDICINE

## 2023-09-07 ENCOUNTER — OFFICE VISIT (OUTPATIENT)
Dept: UROLOGY | Age: 88
End: 2023-09-07

## 2023-09-07 VITALS
WEIGHT: 176 LBS | BODY MASS INDEX: 25.2 KG/M2 | SYSTOLIC BLOOD PRESSURE: 128 MMHG | HEIGHT: 70 IN | DIASTOLIC BLOOD PRESSURE: 75 MMHG | RESPIRATION RATE: 18 BRPM

## 2023-09-07 DIAGNOSIS — R33.9 INCOMPLETE BLADDER EMPTYING: Primary | ICD-10-CM

## 2023-09-07 DIAGNOSIS — N40.1 BPH WITH OBSTRUCTION/LOWER URINARY TRACT SYMPTOMS: ICD-10-CM

## 2023-09-07 DIAGNOSIS — N18.31 CHRONIC RENAL FAILURE, STAGE 3A (HCC): ICD-10-CM

## 2023-09-07 DIAGNOSIS — N13.8 BPH WITH OBSTRUCTION/LOWER URINARY TRACT SYMPTOMS: ICD-10-CM

## 2023-09-07 ASSESSMENT — ENCOUNTER SYMPTOMS
ABDOMINAL PAIN: 0
EYE REDNESS: 0
WHEEZING: 0
VOMITING: 0
BACK PAIN: 0
NAUSEA: 0
COLOR CHANGE: 0
CONSTIPATION: 0
SHORTNESS OF BREATH: 0
COUGH: 0

## 2023-09-07 NOTE — PROGRESS NOTES
Patient voided 4:30 PM    Bladderscan performed in office today:  Patient voided - 300 mL, PVR - 482 mL

## 2023-09-07 NOTE — PROGRESS NOTES
HPI:          Patient is a 80 y.o. male in no acute distress. He is alert and oriented to person, place, and time. History  2015 Difficulty voiding after open heart surgery.  ml after not voiding for 2.5 hours. We started Flomax and added Proscar. Encouraged double voiding for incomplete bladder emptying. PVR 70ml-360ml     10/2019 Increased Flomax to twice per day. Continued Proscar. Prior to his next visit we will check a BMP and renal ultrasound. Did explain to the patient and his son that we are trying to prevent worsening renal function, we will likely not be successful in improving the renal function. 11/2019 Flomax back to once a day, no change in PVR    11/2022 PVR 850ml    Today  Here today to follow-up for incomplete bladder emptying and chronic kidney disease. He is taking Flomax and Proscar daily. He has not had any issues with urinary tract infections. He denies any frequency, urgency or nocturia. He does have some incontinence, but he is not wearing pads or changing his underwear more than once per day. He denies that this is an issue. Random bladder scan is 482 mL. This is improved from prior PVRs. Labs from 7/2023 reviewed: Creatinine 2.1, GFR 30, BUN 28. These are consistent with prior labs.        Past Medical History:   Diagnosis Date    Acute renal insufficiency 10/5/2015    Anxiety 10/14/2015    ASHD (arteriosclerotic heart disease) 10/14/2015    Bipolar disorder ([address]) 1976    Essential hypertension 9/23/2016    Hyperlipidemia     Hypothyroidism     Localized osteoarthrosis, lower leg 10/14/2015    Parkinson's disease ([address])     light, 2010     Past Surgical History:   Procedure Laterality Date    CORONARY ARTERY BYPASS GRAFT  08/21/2015     X3 W/ LIMA TO LAD    DIAGNOSTIC CARDIAC CATH LAB PROCEDURE  08/17/15    OTHER SURGICAL HISTORY  08/21/2015    endovascular vein harvesting    OTHER SURGICAL HISTORY  08/21/2015     normothermic cardiopulmonary bypass

## 2023-12-14 ENCOUNTER — HOSPITAL ENCOUNTER (OUTPATIENT)
Age: 88
Setting detail: SPECIMEN
Discharge: HOME | End: 2023-12-14

## 2023-12-14 DIAGNOSIS — I95.9 HYPOTENSION, UNSPECIFIED HYPOTENSION TYPE: ICD-10-CM

## 2023-12-14 DIAGNOSIS — R33.9 INCOMPLETE BLADDER EMPTYING: ICD-10-CM

## 2023-12-14 DIAGNOSIS — N18.4 CKD (CHRONIC KIDNEY DISEASE) STAGE 4, GFR 15-29 ML/MIN (HCC): ICD-10-CM

## 2023-12-14 DIAGNOSIS — N18.31 CHRONIC RENAL FAILURE, STAGE 3A (HCC): ICD-10-CM

## 2023-12-14 DIAGNOSIS — I25.10 ASHD (ARTERIOSCLEROTIC HEART DISEASE): ICD-10-CM

## 2023-12-14 DIAGNOSIS — I10 ESSENTIAL HYPERTENSION: ICD-10-CM

## 2023-12-14 DIAGNOSIS — I47.29 NSVT (NONSUSTAINED VENTRICULAR TACHYCARDIA) (HCC): ICD-10-CM

## 2023-12-14 DIAGNOSIS — I51.7 LVH (LEFT VENTRICULAR HYPERTROPHY): ICD-10-CM

## 2023-12-14 DIAGNOSIS — E03.9 HYPOTHYROIDISM, UNSPECIFIED TYPE: ICD-10-CM

## 2023-12-14 LAB
ALBUMIN SERPL-MCNC: 3.3 G/DL (ref 3.5–5.2)
ALBUMIN/GLOB SERPL: 1 {RATIO} (ref 1–2.5)
ALP SERPL-CCNC: 144 U/L (ref 40–129)
ALT SERPL-CCNC: 16 U/L (ref 5–41)
ANION GAP SERPL CALCULATED.3IONS-SCNC: 8 MMOL/L (ref 9–17)
AST SERPL-CCNC: 21 U/L
BASOPHILS # BLD: 0.07 K/UL (ref 0–0.2)
BASOPHILS NFR BLD: 1 % (ref 0–2)
BILIRUB SERPL-MCNC: 0.3 MG/DL (ref 0.3–1.2)
BNP SERPL-MCNC: 188 PG/ML
BUN SERPL-MCNC: 35 MG/DL (ref 8–23)
CALCIUM SERPL-MCNC: 9.4 MG/DL (ref 8.6–10.4)
CHLORIDE SERPL-SCNC: 113 MMOL/L (ref 98–107)
CO2 SERPL-SCNC: 28 MMOL/L (ref 20–31)
CREAT SERPL-MCNC: 2.2 MG/DL (ref 0.7–1.2)
EOSINOPHIL # BLD: 0.82 K/UL (ref 0–0.44)
EOSINOPHILS RELATIVE PERCENT: 10 % (ref 1–4)
ERYTHROCYTE [DISTWIDTH] IN BLOOD BY AUTOMATED COUNT: 13.8 % (ref 11.8–14.4)
GFR SERPL CREATININE-BSD FRML MDRD: 28 ML/MIN/1.73M2
GLUCOSE SERPL-MCNC: 87 MG/DL (ref 70–99)
HCT VFR BLD AUTO: 44.6 % (ref 40.7–50.3)
HGB BLD-MCNC: 14 G/DL (ref 13–17)
IMM GRANULOCYTES # BLD AUTO: 0.04 K/UL (ref 0–0.3)
IMM GRANULOCYTES NFR BLD: 1 %
LYMPHOCYTES NFR BLD: 1.64 K/UL (ref 1.1–3.7)
LYMPHOCYTES RELATIVE PERCENT: 20 % (ref 24–43)
MCH RBC QN AUTO: 33.3 PG (ref 25.2–33.5)
MCHC RBC AUTO-ENTMCNC: 31.4 G/DL (ref 28.4–34.8)
MCV RBC AUTO: 106.2 FL (ref 82.6–102.9)
MONOCYTES NFR BLD: 1.08 K/UL (ref 0.1–1.2)
MONOCYTES NFR BLD: 13 % (ref 3–12)
NEUTROPHILS NFR BLD: 55 % (ref 36–65)
NEUTS SEG NFR BLD: 4.48 K/UL (ref 1.5–8.1)
NRBC BLD-RTO: 0 PER 100 WBC
PLATELET # BLD AUTO: 298 K/UL (ref 138–453)
PMV BLD AUTO: 11.5 FL (ref 8.1–13.5)
POTASSIUM SERPL-SCNC: 5.2 MMOL/L (ref 3.7–5.3)
PROT SERPL-MCNC: 6.6 G/DL (ref 6.4–8.3)
RBC # BLD AUTO: 4.2 M/UL (ref 4.21–5.77)
RBC # BLD: ABNORMAL 10*6/UL
SODIUM SERPL-SCNC: 149 MMOL/L (ref 135–144)
TSH SERPL DL<=0.05 MIU/L-ACNC: 0.6 UIU/ML (ref 0.3–5)
WBC OTHER # BLD: 8.1 K/UL (ref 3.5–11.3)

## 2023-12-27 ENCOUNTER — HOSPITAL ENCOUNTER (INPATIENT)
Age: 88
LOS: 4 days | Discharge: NURSING, REHAB OR LONG-TERM CARE | DRG: 558 | End: 2023-12-31
Attending: EMERGENCY MEDICINE | Admitting: STUDENT IN AN ORGANIZED HEALTH CARE EDUCATION/TRAINING PROGRAM
Payer: MEDICARE

## 2023-12-27 ENCOUNTER — APPOINTMENT (OUTPATIENT)
Dept: CT IMAGING | Age: 88
DRG: 558 | End: 2023-12-27
Payer: MEDICARE

## 2023-12-27 ENCOUNTER — APPOINTMENT (OUTPATIENT)
Dept: GENERAL RADIOLOGY | Age: 88
DRG: 558 | End: 2023-12-27
Payer: MEDICARE

## 2023-12-27 DIAGNOSIS — I47.29 NSVT (NONSUSTAINED VENTRICULAR TACHYCARDIA) (HCC): ICD-10-CM

## 2023-12-27 DIAGNOSIS — I25.10 ASHD (ARTERIOSCLEROTIC HEART DISEASE): ICD-10-CM

## 2023-12-27 DIAGNOSIS — T79.6XXA TRAUMATIC RHABDOMYOLYSIS, INITIAL ENCOUNTER (HCC): Primary | ICD-10-CM
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
## 2023-12-27 DIAGNOSIS — R55 RECURRENT SYNCOPE: ICD-10-CM

## 2023-12-27 DIAGNOSIS — T07.XXXA MULTIPLE CONTUSIONS: ICD-10-CM

## 2023-12-27 DIAGNOSIS — I10 ESSENTIAL HYPERTENSION: ICD-10-CM

## 2023-12-27 PROBLEM — M62.82 RHABDOMYOLYSIS: Status: ACTIVE | Noted: 2023-12-27

## 2023-12-27 LAB
ALBUMIN SERPL-MCNC: 3.5 G/DL (ref 3.5–5.2)
ALBUMIN/GLOB SERPL: 1 {RATIO} (ref 1–2.5)
ALP SERPL-CCNC: 142 U/L (ref 40–129)
ALT SERPL-CCNC: 29 U/L (ref 5–41)
ANION GAP SERPL CALCULATED.3IONS-SCNC: 12 MMOL/L (ref 9–17)
AST SERPL-CCNC: 49 U/L
BASOPHILS # BLD: 0.03 K/UL (ref 0–0.2)
BASOPHILS NFR BLD: 0 % (ref 0–2)
BILIRUB SERPL-MCNC: 0.6 MG/DL (ref 0.3–1.2)
BILIRUB UR QL STRIP: NEGATIVE
BUN SERPL-MCNC: 50 MG/DL (ref 8–23)
BUN/CREAT SERPL: 21 (ref 9–20)
CALCIUM SERPL-MCNC: 9.9 MG/DL (ref 8.6–10.4)
CHLORIDE SERPL-SCNC: 114 MMOL/L (ref 98–107)
CK SERPL-CCNC: 675 U/L (ref 39–308)
CLARITY UR: CLEAR
CO2 SERPL-SCNC: 26 MMOL/L (ref 20–31)
COLOR UR: YELLOW
CREAT SERPL-MCNC: 2.4 MG/DL (ref 0.7–1.2)
EOSINOPHIL # BLD: 0.09 K/UL (ref 0–0.44)
EOSINOPHILS RELATIVE PERCENT: 1 % (ref 1–4)
EPI CELLS #/AREA URNS HPF: NORMAL /HPF (ref 0–5)
ERYTHROCYTE [DISTWIDTH] IN BLOOD BY AUTOMATED COUNT: 14 % (ref 11.8–14.4)
GFR SERPL CREATININE-BSD FRML MDRD: 25 ML/MIN/1.73M2
GLUCOSE SERPL-MCNC: 92 MG/DL (ref 70–99)
GLUCOSE UR STRIP-MCNC: NEGATIVE MG/DL
HCT VFR BLD AUTO: 42.6 % (ref 40.7–50.3)
HGB BLD-MCNC: 13.9 G/DL (ref 13–17)
HGB UR QL STRIP.AUTO: NEGATIVE
IMM GRANULOCYTES # BLD AUTO: 0.04 K/UL (ref 0–0.3)
IMM GRANULOCYTES NFR BLD: 0 %
INR PPP: 1.1
KETONES UR STRIP-MCNC: NEGATIVE MG/DL
LEUKOCYTE ESTERASE UR QL STRIP: NEGATIVE
LYMPHOCYTES NFR BLD: 1.03 K/UL (ref 1.1–3.7)
LYMPHOCYTES RELATIVE PERCENT: 9 % (ref 24–43)
MCH RBC QN AUTO: 33.9 PG (ref 25.2–33.5)
MCHC RBC AUTO-ENTMCNC: 32.6 G/DL (ref 28.4–34.8)
MCV RBC AUTO: 103.9 FL (ref 82.6–102.9)
MONOCYTES NFR BLD: 1 K/UL (ref 0.1–1.2)
MONOCYTES NFR BLD: 9 % (ref 3–12)
MYOGLOBIN SERPL-MCNC: 1517 NG/ML (ref 28–72)
NEUTROPHILS NFR BLD: 81 % (ref 36–65)
NEUTS SEG NFR BLD: 8.73 K/UL (ref 1.5–8.1)
NITRITE UR QL STRIP: NEGATIVE
NRBC BLD-RTO: 0 PER 100 WBC
PH UR STRIP: 7 [PH] (ref 5–9)
PLATELET # BLD AUTO: 265 K/UL (ref 138–453)
PMV BLD AUTO: 10.8 FL (ref 8.1–13.5)
POTASSIUM SERPL-SCNC: 4.5 MMOL/L (ref 3.7–5.3)
PROT SERPL-MCNC: 6.9 G/DL (ref 6.4–8.3)
PROT UR STRIP-MCNC: NEGATIVE MG/DL
PROTHROMBIN TIME: 14.2 SEC (ref 11.9–14.8)
RBC # BLD AUTO: 4.1 M/UL (ref 4.21–5.77)
RBC #/AREA URNS HPF: NORMAL /HPF (ref 0–2)
SODIUM SERPL-SCNC: 152 MMOL/L (ref 135–144)
SP GR UR STRIP: <1.005 (ref 1.01–1.02)
TROPONIN I SERPL HS-MCNC: 74 NG/L (ref 0–22)
TROPONIN I SERPL HS-MCNC: 76 NG/L (ref 0–22)
UROBILINOGEN UR STRIP-ACNC: NORMAL EU/DL (ref 0–1)
WBC #/AREA URNS HPF: NORMAL /HPF (ref 0–5)
WBC OTHER # BLD: 10.9 K/UL (ref 3.5–11.3)

## 2023-12-27 PROCEDURE — 2580000003 HC RX 258: Performed by: STUDENT IN AN ORGANIZED HEALTH CARE EDUCATION/TRAINING PROGRAM

## 2023-12-27 PROCEDURE — 71045 X-RAY EXAM CHEST 1 VIEW: CPT

## 2023-12-27 PROCEDURE — 72128 CT CHEST SPINE W/O DYE: CPT

## 2023-12-27 PROCEDURE — 1200000000 HC SEMI PRIVATE

## 2023-12-27 PROCEDURE — 93005 ELECTROCARDIOGRAM TRACING: CPT | Performed by: EMERGENCY MEDICINE

## 2023-12-27 PROCEDURE — 96361 HYDRATE IV INFUSION ADD-ON: CPT

## 2023-12-27 PROCEDURE — 72131 CT LUMBAR SPINE W/O DYE: CPT

## 2023-12-27 PROCEDURE — 96360 HYDRATION IV INFUSION INIT: CPT

## 2023-12-27 PROCEDURE — 82550 ASSAY OF CK (CPK): CPT

## 2023-12-27 PROCEDURE — 83874 ASSAY OF MYOGLOBIN: CPT

## 2023-12-27 PROCEDURE — 80053 COMPREHEN METABOLIC PANEL: CPT

## 2023-12-27 PROCEDURE — 81001 URINALYSIS AUTO W/SCOPE: CPT

## 2023-12-27 PROCEDURE — 72125 CT NECK SPINE W/O DYE: CPT

## 2023-12-27 PROCEDURE — 94761 N-INVAS EAR/PLS OXIMETRY MLT: CPT

## 2023-12-27 PROCEDURE — 70450 CT HEAD/BRAIN W/O DYE: CPT

## 2023-12-27 PROCEDURE — 85610 PROTHROMBIN TIME: CPT

## 2023-12-27 PROCEDURE — 99285 EMERGENCY DEPT VISIT HI MDM: CPT

## 2023-12-27 PROCEDURE — 6370000000 HC RX 637 (ALT 250 FOR IP): Performed by: STUDENT IN AN ORGANIZED HEALTH CARE EDUCATION/TRAINING PROGRAM

## 2023-12-27 PROCEDURE — 2580000003 HC RX 258: Performed by: EMERGENCY MEDICINE

## 2023-12-27 PROCEDURE — 85025 COMPLETE CBC W/AUTO DIFF WBC: CPT

## 2023-12-27 PROCEDURE — 84484 ASSAY OF TROPONIN QUANT: CPT

## 2023-12-27 PROCEDURE — 36415 COLL VENOUS BLD VENIPUNCTURE: CPT

## 2023-12-27 PROCEDURE — 6360000002 HC RX W HCPCS: Performed by: STUDENT IN AN ORGANIZED HEALTH CARE EDUCATION/TRAINING PROGRAM

## 2023-12-27 RX ORDER — ONDANSETRON 2 MG/ML
4 INJECTION INTRAMUSCULAR; INTRAVENOUS EVERY 6 HOURS PRN
Status: DISCONTINUED | OUTPATIENT
Start: 2023-12-27 | End: 2023-12-31 | Stop reason: HOSPADM

## 2023-12-27 RX ORDER — ASPIRIN 81 MG/1
81 TABLET ORAL DAILY
Status: DISCONTINUED | OUTPATIENT
Start: 2023-12-27 | End: 2023-12-31 | Stop reason: HOSPADM

## 2023-12-27 RX ORDER — FINASTERIDE 5 MG/1
5 TABLET, FILM COATED ORAL DAILY
Status: DISCONTINUED | OUTPATIENT
Start: 2023-12-27 | End: 2023-12-31 | Stop reason: HOSPADM

## 2023-12-27 RX ORDER — SODIUM CHLORIDE 9 MG/ML
INJECTION, SOLUTION INTRAVENOUS CONTINUOUS
Status: DISCONTINUED | OUTPATIENT
Start: 2023-12-27 | End: 2023-12-29

## 2023-12-27 RX ORDER — POTASSIUM CHLORIDE 20 MEQ/1
40 TABLET, EXTENDED RELEASE ORAL PRN
Status: DISCONTINUED | OUTPATIENT
Start: 2023-12-27 | End: 2023-12-27

## 2023-12-27 RX ORDER — LEVOTHYROXINE SODIUM 88 UG/1
88 TABLET ORAL DAILY
Status: DISCONTINUED | OUTPATIENT
Start: 2023-12-27 | End: 2023-12-31 | Stop reason: HOSPADM

## 2023-12-27 RX ORDER — AMIODARONE HYDROCHLORIDE 200 MG/1
200 TABLET ORAL DAILY
Status: DISCONTINUED | OUTPATIENT
Start: 2023-12-27 | End: 2023-12-29

## 2023-12-27 RX ORDER — ACETAMINOPHEN 650 MG/1
650 SUPPOSITORY RECTAL EVERY 6 HOURS PRN
Status: DISCONTINUED | OUTPATIENT
Start: 2023-12-27 | End: 2023-12-31 | Stop reason: HOSPADM

## 2023-12-27 RX ORDER — 0.9 % SODIUM CHLORIDE 0.9 %
1000 INTRAVENOUS SOLUTION INTRAVENOUS ONCE
Status: COMPLETED | OUTPATIENT
Start: 2023-12-27 | End: 2023-12-27

## 2023-12-27 RX ORDER — CHLORAL HYDRATE 500 MG
1 CAPSULE ORAL 2 TIMES DAILY
Status: DISCONTINUED | OUTPATIENT
Start: 2023-12-27 | End: 2023-12-27 | Stop reason: RX

## 2023-12-27 RX ORDER — SODIUM CHLORIDE 0.9 % (FLUSH) 0.9 %
10 SYRINGE (ML) INJECTION EVERY 12 HOURS SCHEDULED
Status: DISCONTINUED | OUTPATIENT
Start: 2023-12-27 | End: 2023-12-31 | Stop reason: HOSPADM

## 2023-12-27 RX ORDER — POTASSIUM CHLORIDE 7.45 MG/ML
10 INJECTION INTRAVENOUS PRN
Status: DISCONTINUED | OUTPATIENT
Start: 2023-12-27 | End: 2023-12-27

## 2023-12-27 RX ORDER — ATORVASTATIN CALCIUM 20 MG/1
20 TABLET, FILM COATED ORAL DAILY
Status: DISCONTINUED | OUTPATIENT
Start: 2023-12-27 | End: 2023-12-31 | Stop reason: HOSPADM

## 2023-12-27 RX ORDER — ONDANSETRON 4 MG/1
4 TABLET, ORALLY DISINTEGRATING ORAL EVERY 8 HOURS PRN
Status: DISCONTINUED | OUTPATIENT
Start: 2023-12-27 | End: 2023-12-31 | Stop reason: HOSPADM

## 2023-12-27 RX ORDER — SODIUM CHLORIDE 0.9 % (FLUSH) 0.9 %
10 SYRINGE (ML) INJECTION PRN
Status: DISCONTINUED | OUTPATIENT
Start: 2023-12-27 | End: 2023-12-31 | Stop reason: HOSPADM

## 2023-12-27 RX ORDER — ACETAMINOPHEN 325 MG/1
650 TABLET ORAL EVERY 6 HOURS PRN
Status: DISCONTINUED | OUTPATIENT
Start: 2023-12-27 | End: 2023-12-31 | Stop reason: HOSPADM

## 2023-12-27 RX ORDER — SODIUM CHLORIDE 9 MG/ML
INJECTION, SOLUTION INTRAVENOUS PRN
Status: DISCONTINUED | OUTPATIENT
Start: 2023-12-27 | End: 2023-12-31 | Stop reason: HOSPADM

## 2023-12-27 RX ORDER — MAGNESIUM SULFATE IN WATER 40 MG/ML
2000 INJECTION, SOLUTION INTRAVENOUS PRN
Status: DISCONTINUED | OUTPATIENT
Start: 2023-12-27 | End: 2023-12-27

## 2023-12-27 RX ORDER — ENOXAPARIN SODIUM 100 MG/ML
30 INJECTION SUBCUTANEOUS DAILY
Status: DISCONTINUED | OUTPATIENT
Start: 2023-12-27 | End: 2023-12-31 | Stop reason: HOSPADM

## 2023-12-27 RX ORDER — POLYETHYLENE GLYCOL 3350 17 G/17G
17 POWDER, FOR SOLUTION ORAL DAILY PRN
Status: DISCONTINUED | OUTPATIENT
Start: 2023-12-27 | End: 2023-12-31 | Stop reason: HOSPADM

## 2023-12-27 RX ORDER — BUPROPION HYDROCHLORIDE 100 MG/1
100 TABLET, EXTENDED RELEASE ORAL 2 TIMES DAILY
Status: DISCONTINUED | OUTPATIENT
Start: 2023-12-27 | End: 2023-12-31 | Stop reason: HOSPADM

## 2023-12-27 RX ORDER — ASCORBIC ACID 500 MG
1000 TABLET ORAL 2 TIMES DAILY
Status: DISCONTINUED | OUTPATIENT
Start: 2023-12-27 | End: 2023-12-31 | Stop reason: HOSPADM

## 2023-12-27 RX ORDER — TAMSULOSIN HYDROCHLORIDE 0.4 MG/1
0.4 CAPSULE ORAL DAILY
Status: DISCONTINUED | OUTPATIENT
Start: 2023-12-27 | End: 2023-12-28

## 2023-12-27 RX ORDER — POLYETHYLENE GLYCOL 3350 17 G/17G
17 POWDER, FOR SOLUTION ORAL DAILY
Status: DISCONTINUED | OUTPATIENT
Start: 2023-12-27 | End: 2023-12-31 | Stop reason: HOSPADM

## 2023-12-27 RX ORDER — MULTIVITAMIN WITH IRON
1 TABLET ORAL DAILY
Status: DISCONTINUED | OUTPATIENT
Start: 2023-12-27 | End: 2023-12-31 | Stop reason: HOSPADM

## 2023-12-27 RX ORDER — FLUDROCORTISONE ACETATE 0.1 MG/1
0.1 TABLET ORAL DAILY
Status: DISCONTINUED | OUTPATIENT
Start: 2023-12-27 | End: 2023-12-29

## 2023-12-27 RX ADMIN — FLUDROCORTISONE ACETATE 0.1 MG: 0.1 TABLET ORAL at 17:29

## 2023-12-27 RX ADMIN — BUPROPION HYDROCHLORIDE 100 MG: 100 TABLET, FILM COATED, EXTENDED RELEASE ORAL at 20:42

## 2023-12-27 RX ADMIN — ACETAMINOPHEN 650 MG: 325 TABLET ORAL at 18:24

## 2023-12-27 RX ADMIN — SODIUM CHLORIDE: 9 INJECTION, SOLUTION INTRAVENOUS at 23:04

## 2023-12-27 RX ADMIN — SODIUM CHLORIDE: 9 INJECTION, SOLUTION INTRAVENOUS at 16:38

## 2023-12-27 RX ADMIN — POLYETHYLENE GLYCOL 3350 17 G: 17 POWDER, FOR SOLUTION ORAL at 17:28

## 2023-12-27 RX ADMIN — TAMSULOSIN HYDROCHLORIDE 0.4 MG: 0.4 CAPSULE ORAL at 17:29

## 2023-12-27 RX ADMIN — SODIUM CHLORIDE 1000 ML: 9 INJECTION, SOLUTION INTRAVENOUS at 13:38

## 2023-12-27 RX ADMIN — ASPIRIN 81 MG: 81 TABLET, COATED ORAL at 17:29

## 2023-12-27 RX ADMIN — FINASTERIDE 5 MG: 5 TABLET, FILM COATED ORAL at 17:29

## 2023-12-27 RX ADMIN — MULTIVITAMIN TABLET 1 TABLET: TABLET at 17:29

## 2023-12-27 RX ADMIN — LEVOTHYROXINE SODIUM 88 MCG: 0.09 TABLET ORAL at 17:29

## 2023-12-27 RX ADMIN — OXYCODONE HYDROCHLORIDE AND ACETAMINOPHEN 1000 MG: 500 TABLET ORAL at 20:42

## 2023-12-27 RX ADMIN — ENOXAPARIN SODIUM 30 MG: 100 INJECTION SUBCUTANEOUS at 20:41

## 2023-12-27 RX ADMIN — AMIODARONE HYDROCHLORIDE 200 MG: 200 TABLET ORAL at 17:29

## 2023-12-27 ASSESSMENT — PAIN DESCRIPTION - DESCRIPTORS: DESCRIPTORS: ACHING

## 2023-12-27 ASSESSMENT — PAIN SCALES - GENERAL
PAINLEVEL_OUTOF10: 3
PAINLEVEL_OUTOF10: 4
PAINLEVEL_OUTOF10: 0

## 2023-12-27 ASSESSMENT — PAIN DESCRIPTION - ORIENTATION
ORIENTATION: MID;LOWER
ORIENTATION: MID

## 2023-12-27 ASSESSMENT — PAIN - FUNCTIONAL ASSESSMENT
PAIN_FUNCTIONAL_ASSESSMENT: 0-10
PAIN_FUNCTIONAL_ASSESSMENT: ACTIVITIES ARE NOT PREVENTED

## 2023-12-27 ASSESSMENT — PAIN DESCRIPTION - LOCATION
LOCATION: BACK
LOCATION: BACK

## 2023-12-27 ASSESSMENT — PAIN DESCRIPTION - FREQUENCY: FREQUENCY: CONTINUOUS

## 2023-12-27 ASSESSMENT — PAIN DESCRIPTION - PAIN TYPE: TYPE: ACUTE PAIN

## 2023-12-27 ASSESSMENT — PAIN DESCRIPTION - ONSET: ONSET: ON-GOING

## 2023-12-27 NOTE — PROGRESS NOTES
Writer at bedside at this time to perform shift assessment. Patient is lying in bed at this time. Vital signs taken and assessment completed at this time. Patient is alert and oriented and has complaint of lower/mid back pain 3/10; PRN tylenol given at this time. Patient denies any further needs at this time. Call light within reach, bed alarm on for safety, care ongoing.

## 2023-12-27 NOTE — PROGRESS NOTES
Herbal and Nutritional Product Restrictions      The following herbal, alternative, and/or nutritional/dietary supplement product(s) has been discontinued per [facility] approved policy:      Fish oil 5,060 mg capsule BID    Please reorder upon discharge if appropriate.     Thank you,  [id number] [facility]  12/27/2023 4:35 PM

## 2023-12-27 NOTE — PROGRESS NOTES
Patient admitted to the floor at this time. Patient located in room 332. Report received from [facility] at the bedside. Patient transferred to the bed at this time with moderate assistance of two people. Vital signs, weight, height, and head to toe assessment completed at this time, see flowsheets for more details. Patient denies of pain at this time. Patient is A&O x4. Continuous telemetry placed at this time. Patient oriented to the call light system at this time. White board completed. Call light and bedside table within reach. Bed wheels locked. Bed in lowest position. Bed alarm on.

## 2023-12-28 PROBLEM — R53.1 GENERALIZED WEAKNESS: Status: ACTIVE | Noted: 2023-12-28

## 2023-12-28 LAB
25(OH)D3 SERPL-MCNC: 33.3 NG/ML
ALBUMIN SERPL-MCNC: 2.9 G/DL (ref 3.5–5.2)
ALBUMIN/GLOB SERPL: 1 {RATIO} (ref 1–2.5)
ALP SERPL-CCNC: 117 U/L (ref 40–129)
ALT SERPL-CCNC: 32 U/L (ref 5–41)
ANION GAP SERPL CALCULATED.3IONS-SCNC: 10 MMOL/L (ref 9–17)
AST SERPL-CCNC: 68 U/L
BASOPHILS # BLD: 0.04 K/UL (ref 0–0.2)
BASOPHILS NFR BLD: 1 % (ref 0–2)
BILIRUB SERPL-MCNC: 0.6 MG/DL (ref 0.3–1.2)
BUN SERPL-MCNC: 45 MG/DL (ref 8–23)
BUN/CREAT SERPL: 20 (ref 9–20)
CALCIUM SERPL-MCNC: 9.1 MG/DL (ref 8.6–10.4)
CHLORIDE SERPL-SCNC: 112 MMOL/L (ref 98–107)
CK SERPL-CCNC: 850 U/L (ref 39–308)
CO2 SERPL-SCNC: 24 MMOL/L (ref 20–31)
CREAT SERPL-MCNC: 2.2 MG/DL (ref 0.7–1.2)
EKG ATRIAL RATE: 90 BPM
EKG P-R INTERVAL: 212 MS
EKG Q-T INTERVAL: 390 MS
EKG QRS DURATION: 78 MS
EKG QTC CALCULATION (BAZETT): 477 MS
EKG R AXIS: 17 DEGREES
EKG T AXIS: 18 DEGREES
EKG VENTRICULAR RATE: 90 BPM
EOSINOPHIL # BLD: 0.37 K/UL (ref 0–0.44)
EOSINOPHILS RELATIVE PERCENT: 5 % (ref 1–4)
ERYTHROCYTE [DISTWIDTH] IN BLOOD BY AUTOMATED COUNT: 13.9 % (ref 11.8–14.4)
FOLATE SERPL-MCNC: >20 NG/ML
GFR SERPL CREATININE-BSD FRML MDRD: 28 ML/MIN/1.73M2
GLUCOSE SERPL-MCNC: 82 MG/DL (ref 70–99)
HCT VFR BLD AUTO: 37.5 % (ref 40.7–50.3)
HGB BLD-MCNC: 12.3 G/DL (ref 13–17)
IMM GRANULOCYTES # BLD AUTO: 0.04 K/UL (ref 0–0.3)
IMM GRANULOCYTES NFR BLD: 1 %
LYMPHOCYTES NFR BLD: 1.45 K/UL (ref 1.1–3.7)
LYMPHOCYTES RELATIVE PERCENT: 18 % (ref 24–43)
MCH RBC QN AUTO: 34 PG (ref 25.2–33.5)
MCHC RBC AUTO-ENTMCNC: 32.8 G/DL (ref 28.4–34.8)
MCV RBC AUTO: 103.6 FL (ref 82.6–102.9)
MONOCYTES NFR BLD: 0.87 K/UL (ref 0.1–1.2)
MONOCYTES NFR BLD: 11 % (ref 3–12)
NEUTROPHILS NFR BLD: 64 % (ref 36–65)
NEUTS SEG NFR BLD: 5.36 K/UL (ref 1.5–8.1)
NRBC BLD-RTO: 0 PER 100 WBC
PLATELET # BLD AUTO: 237 K/UL (ref 138–453)
PMV BLD AUTO: 10.7 FL (ref 8.1–13.5)
POTASSIUM SERPL-SCNC: 4.1 MMOL/L (ref 3.7–5.3)
PROT SERPL-MCNC: 5.9 G/DL (ref 6.4–8.3)
RBC # BLD AUTO: 3.62 M/UL (ref 4.21–5.77)
SODIUM SERPL-SCNC: 146 MMOL/L (ref 135–144)
TROPONIN I SERPL HS-MCNC: 88 NG/L (ref 0–22)
VIT B12 SERPL-MCNC: 490 PG/ML (ref 232–1245)
WBC OTHER # BLD: 8.1 K/UL (ref 3.5–11.3)

## 2023-12-28 PROCEDURE — 1200000000 HC SEMI PRIVATE

## 2023-12-28 PROCEDURE — 82746 ASSAY OF FOLIC ACID SERUM: CPT

## 2023-12-28 PROCEDURE — 92610 EVALUATE SWALLOWING FUNCTION: CPT

## 2023-12-28 PROCEDURE — 82306 VITAMIN D 25 HYDROXY: CPT

## 2023-12-28 PROCEDURE — 6370000000 HC RX 637 (ALT 250 FOR IP): Performed by: NURSE PRACTITIONER

## 2023-12-28 PROCEDURE — 6370000000 HC RX 637 (ALT 250 FOR IP): Performed by: STUDENT IN AN ORGANIZED HEALTH CARE EDUCATION/TRAINING PROGRAM

## 2023-12-28 PROCEDURE — 6360000002 HC RX W HCPCS: Performed by: STUDENT IN AN ORGANIZED HEALTH CARE EDUCATION/TRAINING PROGRAM

## 2023-12-28 PROCEDURE — 84484 ASSAY OF TROPONIN QUANT: CPT

## 2023-12-28 PROCEDURE — 51702 INSERT TEMP BLADDER CATH: CPT

## 2023-12-28 PROCEDURE — 82550 ASSAY OF CK (CPK): CPT

## 2023-12-28 PROCEDURE — 97116 GAIT TRAINING THERAPY: CPT

## 2023-12-28 PROCEDURE — 85025 COMPLETE CBC W/AUTO DIFF WBC: CPT

## 2023-12-28 PROCEDURE — 97162 PT EVAL MOD COMPLEX 30 MIN: CPT

## 2023-12-28 PROCEDURE — 80053 COMPREHEN METABOLIC PANEL: CPT

## 2023-12-28 PROCEDURE — 99223 1ST HOSP IP/OBS HIGH 75: CPT | Performed by: FAMILY MEDICINE

## 2023-12-28 PROCEDURE — 97110 THERAPEUTIC EXERCISES: CPT

## 2023-12-28 PROCEDURE — 82607 VITAMIN B-12: CPT

## 2023-12-28 PROCEDURE — 36415 COLL VENOUS BLD VENIPUNCTURE: CPT

## 2023-12-28 PROCEDURE — 94761 N-INVAS EAR/PLS OXIMETRY MLT: CPT

## 2023-12-28 PROCEDURE — 2580000003 HC RX 258: Performed by: STUDENT IN AN ORGANIZED HEALTH CARE EDUCATION/TRAINING PROGRAM

## 2023-12-28 PROCEDURE — 93010 ELECTROCARDIOGRAM REPORT: CPT | Performed by: INTERNAL MEDICINE

## 2023-12-28 RX ORDER — TAMSULOSIN HYDROCHLORIDE 0.4 MG/1
0.4 CAPSULE ORAL 2 TIMES DAILY
Status: DISCONTINUED | OUTPATIENT
Start: 2023-12-28 | End: 2023-12-31 | Stop reason: HOSPADM

## 2023-12-28 RX ADMIN — OXYCODONE HYDROCHLORIDE AND ACETAMINOPHEN 1000 MG: 500 TABLET ORAL at 20:46

## 2023-12-28 RX ADMIN — BUPROPION HYDROCHLORIDE 100 MG: 100 TABLET, FILM COATED, EXTENDED RELEASE ORAL at 20:45

## 2023-12-28 RX ADMIN — TAMSULOSIN HYDROCHLORIDE 0.4 MG: 0.4 CAPSULE ORAL at 20:46

## 2023-12-28 RX ADMIN — BUPROPION HYDROCHLORIDE 100 MG: 100 TABLET, FILM COATED, EXTENDED RELEASE ORAL at 09:22

## 2023-12-28 RX ADMIN — FINASTERIDE 5 MG: 5 TABLET, FILM COATED ORAL at 09:23

## 2023-12-28 RX ADMIN — OXYCODONE HYDROCHLORIDE AND ACETAMINOPHEN 1000 MG: 500 TABLET ORAL at 09:23

## 2023-12-28 RX ADMIN — ENOXAPARIN SODIUM 30 MG: 100 INJECTION SUBCUTANEOUS at 09:24

## 2023-12-28 RX ADMIN — LEVOTHYROXINE SODIUM 88 MCG: 0.09 TABLET ORAL at 09:22

## 2023-12-28 RX ADMIN — MULTIVITAMIN TABLET 1 TABLET: TABLET at 09:23

## 2023-12-28 RX ADMIN — TAMSULOSIN HYDROCHLORIDE 0.4 MG: 0.4 CAPSULE ORAL at 09:22

## 2023-12-28 RX ADMIN — AMIODARONE HYDROCHLORIDE 200 MG: 200 TABLET ORAL at 09:23

## 2023-12-28 RX ADMIN — FLUDROCORTISONE ACETATE 0.1 MG: 0.1 TABLET ORAL at 09:22

## 2023-12-28 RX ADMIN — SODIUM CHLORIDE: 9 INJECTION, SOLUTION INTRAVENOUS at 13:04

## 2023-12-28 ASSESSMENT — PAIN SCALES - GENERAL: PAINLEVEL_OUTOF10: 0

## 2023-12-28 NOTE — PROGRESS NOTES
Occupational Therapy  Facility/Department: [facility] MED SURG  Daily Treatment Note  NAME: Eliud Hernandez  : 1934  MRN: [id number]    Date of Service: 2023    Discharge Recommendations:  Continue to assess pending progress         Patient Diagnosis(es): The primary encounter diagnosis was Traumatic rhabdomyolysis, initial encounter ([address]). Diagnoses of Multiple contusions and Fall, initial encounter were also pertinent to this visit. Assessment    Discharge Recommendations: Continue to assess pending progress      Plan   Occupational Therapy Plan  Times Per Day: Once a day  Days Per Week: 7 Days  Current Treatment Recommendations: Strengthening;Balance training;Functional mobility training; Endurance training;ROM;Safety education & training;Patient/Caregiver education & training;Equipment evaluation, education, & procurement;Self-Care / ADL     Restrictions  Restrictions/Precautions  Restrictions/Precautions: General Precautions; Fall Risk    Subjective   Subjective  Subjective: pt lying upright in bed upon arrival. Pt agreeable to ther-ex. Pain: no pain verbalized. Orientation  Overall Orientation Status: Within Functional Limits  Cognition  Overall Cognitive Status: WFL        Objective    Vitals           ADL  Feeding: Independent  Grooming: Contact guard assistance  UE Bathing: Stand by assistance  LE Bathing: Minimal assistance  UE Dressing: Stand by assistance  LE Dressing: Minimal assistance  Toileting: Minimal assistance  Functional Mobility: Minimal assistance  Functional Mobility Skilled Clinical Factors: min A c FWW - LOB c min A for correction  Additional Comments: Min A ADL transfers. OT Exercises  Exercise Treatment: Pt completed BUE ther ex x 5 planes x 10 reps x 1 set to increase UE strength and endurance in order to ease completion of ADL tasks. Pt required RBs as needed secondary to fatigue. Safety Devices  Type of Devices: All saravanan prominences offloaded; All fall risk precautions in place;Call light within reach;Left in bed;Nurse notified;Bed alarm in place     Patient Education  Education Given To: Patient  Education Provided: Role of Therapy;Plan of Care;Transfer Training  Education Method: Verbal  Barriers to Learning: None  Education Outcome: Verbalized understanding;Demonstrated understanding    Goals  Short Term Goals  Time Frame for Short Term Goals: 21 visits  Short Term Goal 1: Patient to complete ADL routine c mod I (with exception of mary hose) to ensure safe and indep return home.  Short Term Goal 2: Patient to engage in 15 minutes of ther ex/ther act to improve strength and activity tolerance for I/ADL upon return home.  Short Term Goal 3: Patient to be educated on d/c folder, AE/DME and home safety to ensure safe return home.  Short Term Goal 4: Patient to engage in 10 minutes of dynamic standing during functional task of choice s LOB to improve standing tolerance, balance and safety during I/ADL.    AM-PAC - ADL       Therapy Time   Individual Concurrent Group Co-treatment   Time In 1234         Time Out 1258         Minutes 24                 TAMMY Osullivan

## 2023-12-28 NOTE — PROGRESS NOTES
Progress Note    SUBJECTIVE:    Patient seen for f/u of Rhabdomyolysis.  He resting in bed no distress or complaints.  Complained of not being able to urinate. Had incontinence of urine.  Post residual void was > 800 ml. Denied constipation with soft bm yesterday     ROS:   Constitutional: negative  for fevers, and negative for chills.  Respiratory: negative for shortness of breath, negative for cough, and negative for wheezing  Cardiovascular: negative for chest pain, and negative for palpitations  Gastrointestinal: negative for abdominal pain, negative for nausea,negative for vomiting, negative for diarrhea, and negative for constipation     All other systems were reviewed with the patient and are negative unless otherwise stated in HPI      OBJECTIVE:      Vitals:   Vitals:    12/28/23 0200   BP:    Pulse: 69   Resp:    Temp:    SpO2:      Weight - Scale: 75.2 kg (165 lb 12.8 oz)   Height: 170.2 cm (5' 7\")     Weight  Wt Readings from Last 3 Encounters:   12/28/23 75.2 kg (165 lb 12.8 oz)   09/25/23 76.4 kg (168 lb 6.4 oz)   09/07/23 79.8 kg (176 lb)     Body mass index is 25.97 kg/m².    24HR INTAKE/OUTPUT:      Intake/Output Summary (Last 24 hours) at 12/28/2023 0719  Last data filed at 12/28/2023 0530  Gross per 24 hour   Intake 1462.36 ml   Output 400 ml   Net 1062.36 ml     -----------------------------------------------------------------  Exam:    GEN:    Awake, alert and oriented x3.   EYES:  EOMI, pupils equal   NECK: Supple. No lymphadenopathy.  No carotid bruit  CVS:    regular rate and rhythm, no audible murmur  PULM:   diminished with bibasilar rales , no acute respiratory distress  ABD:    Bowels sounds normal.  Abdomen is soft.  No distention.  no tenderness to palpation.   EXT:   no edema bilaterally .  No calf tenderness.   NEURO: Moves all extremities.  Motor and sensory are grossly intact  SKIN:  No rashes.  No skin lesions.   -----------------------------------------------------------------    Diagnostic Data:      Complete Blood Count:   Recent Labs     12/27/23  1334 12/28/23  0540   WBC 10.9 8.1   RBC 4.10* 3.62*   HGB 13.9 12.3*   HCT 42.6 37.5*   .9* 103.6*   MCH 33.9* 34.0*   MCHC 32.6 32.8   RDW 14.0 13.9    237   MPV 10.8 10.7        Last 3 Blood Glucose:   Recent Labs     12/27/23  1334 12/28/23  0540   GLUCOSE 92 82        Comprehensive Metabolic Profile:   Recent Labs     12/27/23  1334 12/28/23  0540   * 146*   K 4.5 4.1   * 112*   CO2 26 24   BUN 50* 45*   CREATININE 2.4* 2.2*   GLUCOSE 92 82   CALCIUM 9.9 9.1   PROT 6.9 5.9*   LABALBU 3.5 2.9*   BILITOT 0.6 0.6   ALKPHOS 142* 117   AST 49* PENDING   ALT 29 32        Urinalysis:   Lab Results   Component Value Date/Time    NITRU NEGATIVE 12/27/2023 04:21 PM    COLORU Yellow 12/27/2023 04:21 PM    PHUR 7.0 12/27/2023 04:21 PM    WBCUA None 12/27/2023 04:21 PM    RBCUA None 12/27/2023 04:21 PM    MUCUS TRACE 10/28/2019 03:40 PM    TRICHOMONAS NOT REPORTED 10/28/2019 03:40 PM    YEAST NOT REPORTED 10/28/2019 03:40 PM    BACTERIA TRACE 10/28/2019 03:40 PM    SPECGRAV <1.005 12/27/2023 04:21 PM    LEUKOCYTESUR NEGATIVE 12/27/2023 04:21 PM    UROBILINOGEN Normal 12/27/2023 04:21 PM    BILIRUBINUR NEGATIVE 12/27/2023 04:21 PM    GLUCOSEU NEGATIVE 12/27/2023 04:21 PM    KETUA NEGATIVE 12/27/2023 04:21 PM    AMORPHOUS NOT REPORTED 10/28/2019 03:40 PM       HgBA1c:    Lab Results   Component Value Date/Time    LABA1C 5.7 02/28/2023 04:14 PM       Lactic Acid: No results found for: \"LACTA\"     Troponin: No results for input(s): \"TROPONINI\" in the last 72 hours. CRP:  No results for input(s): \"CRP\" in the last 72 hours. Radiology/Imaging:  XR CHEST PORTABLE   Final Result   1. Interstitial prominence which could reflect interstitial lung disease or   atypical infection. 2. No confluent airspace consolidation.          CT LUMBAR SPINE WO CONTRAST studies ordered today  Medications:   Increase Flomax to 0.4 mg bid    Nutrition status:   Well developed, well nourished with no malnutrition  Dietician consult initiated    Hospital Prophylaxis:   DVT: Lovenox   Stress Ulcer: N/A     Disposition:  Shared decision making: All test results, treatment options and disposition options were discussed with the patient today  Social determinants of health that may impact management: none  Code status: Full Code   Disposition: Discharge plan is pending    [facility] Advanced Care Planning documentation:  [x] I have confirmed that the patient's Advance Care Plan is present, Code Status is documented, or surrogate decision maker is listed in the patient's medical record  [If \"yes\", STOP HERE]     [] The patient's Advance Care Plan is NOT present because:    []  I confirmed today that the patient does not wish or was not able to name a   surrogate decision maker or provide and advance care plan.    [] Hospice care is currently being provided or has been provided within the   calendar year.    []  I did NOT confirm today the presence of an Advance Care Plan or surrogate   decision maker documented within the patient's medical record.   [DOES NOT SATISFY [facility] PERFORMANCE]    Marva Carty, YOLIS - CNP , YOLIS, NP-C  Hospitalist Medicine        12/28/2023, 7:19 AM

## 2023-12-28 NOTE — H&P
History and Physical    Patient:  Jonathan Iglesias  MRN: [id number]    Chief Complaint: Fall    History Obtained From:  patient, electronic medical record    PCP: Marty Lopez MD    History of Present Illness:   The patient is a 89 y.o. male who presents with a fall.  Past medical history includes ASHD, bipolar, hypertension, hyperlipidemia, renal insufficiency, Parkinson's.  Patient states he lost his balance prior to falling.  Patient states he struck his head but did not lose consciousness.  Complaining of mid back pain.  He has a small laceration to his forehead.  Patient states he normally uses walker to ambulate.  He lives at [facility] alone.  He states he does not have help available at night there.  Patient was seen in the emergency department for another fall on 12/18/2023.  Initial , myoglobin 1570, troponin 76 and 70.  Sodium 152, chloride 114, BUN 50, creatinine 2.4, GFR 25.  CT head, C-spine, T-spine and lumbar spine negative for any acute abnormalities.  Patient denies any chest pain or shortness of breath.  Patient is very unsteady on his feet even with walker use.  He does have intermittent confusion where he forgets to use his walker or that he is trying to move.      Past Medical History:        Diagnosis Date    Acute renal insufficiency 10/5/2015    Anxiety 10/14/2015    ASHD (arteriosclerotic heart disease) 10/14/2015    Bipolar disorder ([facility]) 1976    Essential hypertension 9/23/2016    Hyperlipidemia     Hypothyroidism     Localized osteoarthrosis, lower leg 10/14/2015    Parkinson's disease     light, 2010       Past Surgical History:        Procedure Laterality Date    CORONARY ARTERY BYPASS GRAFT  08/21/2015     X3 W/ LIMA TO LAD    DIAGNOSTIC CARDIAC CATH LAB PROCEDURE  08/17/15    OTHER SURGICAL HISTORY  08/21/2015    endovascular vein harvesting    OTHER SURGICAL HISTORY  08/21/2015     normothermic cardiopulmonary bypass w/ cardioplegic arrest of the heart    OTHER  malnutrition  Dietician consult initiated    Hospital Prophylaxis:   DVT: Lovenox     MDM Data:   Test interpretation:  My independent EKG interpretation: normal sinus rhythm, 1st degree AV block  My independent X-ray interpretation: CT head, C-spine, T-spine, L-spine negative for any acute abnormalities-degenerative changes noted  Management and/or test interpretation discussed with ER MD at time of admission  Consults and Nursing notes were personally reviewed, all current labs and imaging were personally reviewed, tests ordered: CBC, BMP, and history obtained by independent historian       Disposition:  Shared decision making: All test results, treatment options and disposition options were discussed with the patient today  Social determinants of health that may impact management: Pt lives alone and is unable to care for self  Code status: Full Code   Disposition: Discharge plan is pending        Critical Care Time:  Total critical care time caring for this patient with life threatening, unstable organ failure, including direct patient contact, management of life support systems, review of data including imaging and labs, discussions with other team members and physicians at least 0 minutes so far today, excluding separately billable procedures.      [facility] Medication Reconciliation documentation:    [x] I have utilized all available immediate resources to obtain, update, or review the patient's current medications (including all prescriptions, over-the-counter products, herbals, cannabinoid products and bitamin/mineral/dietary/nutritional supplements.  [If 'yes\", STOP HERE]     []  The patient is not eligible for medication reconciliation; the patient is in an emergent medical situation where delaying treatment would jeopardize the patient's health    []  I did NOT confirm, update or review the patient's current list of medications today.  [DOES NOT SATISFY [facility] PERFORMANCE]        [facility] Advanced Care Planning  documentation:  [x] I have confirmed that the patient's Advance Care Plan is present, Code Status is documented, or surrogate decision maker is listed in the patient's medical record  [If \"yes\", STOP HERE]     [] The patient's [address] is NOT present because:    []  I confirmed today that the patient does not wish or was not able to name a   surrogate decision maker or provide and advance care plan.    [] Hospice care is currently being provided or has been provided within the   calendar year. []  I did NOT confirm today the presence of an [address] or surrogate   decision maker documented within the patient's medical record. [DOES NOT SATISFY [facility] PERFORMANCE]    CORE MEASURES  DVT prophylaxis: Lovenox  Decubitus ulcer present on admission: No  CODE STATUS: FULL CODE  Nutrition Status: fair   Physical therapy: Yes   Old [place]arts reviewed: Yes  EKG Reviewed:  Yes  Advance Directive Addressed: Yes    Ora Jeans, APRN - CNP, YOLIS, NP-C  12/28/2023, 2:54 AM

## 2023-12-28 NOTE — PLAN OF CARE
Problem: Discharge Planning  Goal: Discharge to home or other facility with appropriate resources  Outcome: Progressing  Flowsheets (Taken 12/28/2023 0021)  Discharge to home or other facility with appropriate resources:   Identify barriers to discharge with patient and caregiver   Arrange for needed discharge resources and transportation as appropriate     Problem: Safety - Adult  Goal: Free from fall injury  Outcome: Progressing  Flowsheets (Taken 12/28/2023 0021)  Free From Fall Injury: Instruct family/caregiver on patient safety     Problem: ABCDS Injury Assessment  Goal: Absence of physical injury  Outcome: Progressing  Flowsheets (Taken 12/28/2023 0021)  Absence of Physical Injury: Implement safety measures based on patient assessment     Problem: Pain  Goal: Verbalizes/displays adequate comfort level or baseline comfort level  Outcome: Progressing  Flowsheets (Taken 12/28/2023 0021)  Verbalizes/displays adequate comfort level or baseline comfort level:   Encourage patient to monitor pain and request assistance   Administer analgesics based on type and severity of pain and evaluate response   Assess pain using appropriate pain scale   Implement non-pharmacological measures as appropriate and evaluate response

## 2023-12-28 NOTE — PROGRESS NOTES
10/14/2015    Parkinson's disease     light, 2010       Past Surgical History:   Procedure Laterality Date    CORONARY ARTERY BYPASS GRAFT  08/21/2015     X3 W/ LIMA TO LAD    DIAGNOSTIC CARDIAC CATH LAB PROCEDURE  08/17/15    OTHER SURGICAL HISTORY  08/21/2015    endovascular vein harvesting    OTHER SURGICAL HISTORY  08/21/2015     normothermic cardiopulmonary bypass w/ cardioplegic arrest of the heart    OTHER SURGICAL HISTORY  1994    gallstones removed, Lap       No Known Allergies    DATE ONSET: 12/28/23    Date of Evaluation: 12/28/2023    Evaluating Therapist: KAYLA Briseno    Dysphagia Diagnosis    Dysphagia Diagnosis: Mild oral stage dysphagia  Dysphagia Impression : Patient took trials of regular solids, soft and bite size, and pureed foods as well as thin and mildly thick liquids through a straw. Initially, patient was coughing when SLP entered the room and during food swallows he either throat cleared or swallowed adequately. He coughed with thin liquid once during trials, and his vocal quality was wet to begin with. Cannot rule out aspiration in lungs. Patient does not use dentures to eat foods, takes him longer to chew/swallow because of that.    Recommended Diet    Recommendations: Assistance with meals;Dysphagia treatment;Supervision with meals    Diet Solids Recommendation: Easy to Chew    Liquid Consistency Recommendation: Thin    Recommended Form of Meds: Crushed in puree as able    Compensatory Swallowing Strategies : Alternate solids and liquids;Eat/Feed slowly;Assist feed;Remain upright for 30-45 minutes after meals    Reason for Referral    Jonathan Iglesias was referred for a bedside swallow evaluation to assess the efficiency of his swallow function, identify signs and symptoms of aspiration, identify risk factors, and make recommendations regarding safe dietary consistencies, effective compensatory strategies, and safe eating environment.    Prior Dysphagia History  Prior Dysphagia  clear;Strong cough  Pharyngeal Phase Characteristics: Suspected pharyngeal residue  Effective Modifications: Alternate liquids/solids;Straw;Small sips and bites                        Dysphagia Diagnosis    Dysphagia Diagnosis: Mild oral stage dysphagia  Dysphagia Impression : Patient took trials of regular solids, soft and bite size, and pureed foods as well as thin and mildly thick liquids through a straw. Initially, patient was coughing when SLP entered the room and during food swallows he either throat cleared or swallowed adequately. He coughed with thin liquid once during trials, and his vocal quality was wet to begin with. Cannot rule out aspiration in lungs. Patient does not use dentures to eat foods, takes him longer to chew/swallow because of that.    Dysphagia Outcome Severity Scale: Level 5: Mild dysphagia- Distant supervision. May need one diet consistency restricted    Recommendations    Requires SLP Intervention: Yes  Recommendations: Assistance with meals;Dysphagia treatment;Supervision with meals  Diet Solids Recommendation: Easy to Chew  Liquid Consistency Recommendation: Thin  Compensatory Swallowing Strategies : Alternate solids and liquids;Eat/Feed slowly;Assist feed;Remain upright for 30-45 minutes after meals  Recommended Form of Meds: Crushed in puree as able  Frequency of Treatment: daily    Prognosis    Prognosis: Good    Education    Individuals consulted  Consulted and agree with results and recommendations: RN  RN Name: Leti    Patient Education: Patient educated on status of foods/liquids and verbalized he understood.    Treatment/Goals    Short-term Goals  Timeframe for Short-term Goals: 7 days  Goal 1: The patient will tolerate liquids with a straw without signs/symptoms of aspiration/penetration.  Goal 2: The patient will tolerate solid foods without signs/symptoms of aspiration/penetration.  Long-term Goals  Timeframe for Long-term Goals: 14 days  Goal 1: The patient will  tolerate recommended diet for duration of stay. Safety Devices    Safety Devices  Safety Devices in place: Yes  Type of devices: All fall risk precautions in place    Pain Assessment    Pain Assessment: Patient does not c/o pain    Pain Re-assessment    Pain Reassessment: Patient does not c/o pain    Therapy Time    SLP Individual Minutes  Time In: 6269  Time Out: 4378  Minutes: 20     Patient seen due to coughing/choking on rice, doctor changed diet to easy to chew. SLP performed BSE with different consistencies. He did not cough on foods but had a throat clear. He coughed X1 on thin liquid with straw but was drinking quickly. Otherwise he did not cough with thin liquid or mildly thick. Due to mucus/wet coughs, SLP recommends he stays on thin liquid diet but wants assist feed so that he is not shoveling food in his mouth.           Electronically signed: Kevon Brown M.S., [address] [facility]          12/28/2023

## 2023-12-28 NOTE — CARE COORDINATION
Case Management Assessment  Initial Evaluation    Date/Time of Evaluation: 12/28/2023 10:18 AM  Assessment Completed by: RESHMA Melendez    If patient is discharged prior to next notation, then this note serves as note for discharge by case management. Patient Name: Anibal Smiley                   YOB: 1934  Diagnosis: Rhabdomyolysis [M62.82]  Multiple contusions [T07. XXXA]  Fall, initial encounter [id number]. XXXA]  Traumatic rhabdomyolysis, initial encounter ([address]) Wei Tran. 6XXA]                   Date / Time: 12/27/2023 10:33 AM    Patient Admission Status: Inpatient   Readmission Risk (Low < 19, Mod (19-27), High > 27): Readmission Risk Score: 17.1    Current PCP: Shara Mcpherson MD  PCP verified by CM? Yes    Chart Reviewed: Yes      History Provided by: Patient, Child/Family  Patient Orientation: Alert and Oriented, Person, Place, Situation, Self    Patient Cognition: Alert    Hospitalization in the last 30 days (Readmission):  No    If yes, Readmission Assessment in  Navigator will be completed. Advance Directives:      Code Status: Full Code   Patient's Primary Decision Maker is: Legal Next of Kin    Primary Decision MakerTshelli Grewal  Jarrett - [phone]    Discharge Planning:    Patient lives with: Alone Type of Home: Apartment  Primary Care Giver: Family  Patient Support Systems include: Children, Family Members   Current Financial resources: Medicare  Current community resources: None  Current services prior to admission: Durable Medical Equipment, Meals On Wheels            Current DME: Walker            Type of Home Care services:  [employer], Skilled Therapy, Nursing Services    ADLS  Prior functional level: Assistance with the following:, Bathing, Cooking, Housework, Shopping  Current functional level: Assistance with the following:, Bathing, Cooking, [employer], Shopping    PT AM-PAC:   /24  OT AM-PAC:   /24    Family can provide assistance at DC:  Yes  Would you like Case Security/Housekeeping Addressed: No issues identified or concerns.    IF APPLICABLE: The Patient and/or patient representative Jonathan and his family were provided with a choice of provider and agrees with the discharge plan. Freedom of choice list with basic dialogue that supports the patient's individualized plan of care/goals and shares the quality data associated with the providers was provided to: Patient, Patient Representative   Patient Representative Name: son Myles     The Patient and/or Patient Representative Agree with the Discharge Plan? Yes    The discharge plan is to go to SNF for short term rehab.  The son chose [facility] from the list.  [facility]    RESHMA Banks  Case Management Department  Ph: [phone]

## 2023-12-28 NOTE — PROGRESS NOTES
Occupational Therapy  Facility/Department: [facility] AT THE [facility] MED SURG  Occupational Therapy Initial Assessment    Name: Ayan Luna  : 1934  MRN: [id number]  Date of Service: 2023    Discharge Recommendations:  Continue to assess pending progress          Patient Diagnosis(es): The primary encounter diagnosis was Traumatic rhabdomyolysis, initial encounter ([address]). Diagnoses of Multiple contusions and Fall, initial encounter were also pertinent to this visit. Past Medical History:  has a past medical history of Acute renal insufficiency, Anxiety, ASHD (arteriosclerotic heart disease), Bipolar disorder ([address]), Essential hypertension, Hyperlipidemia, Hypothyroidism, Localized osteoarthrosis, lower leg, and Parkinson's disease. Past Surgical History:  has a past surgical history that includes Coronary artery bypass graft (2015 ); other surgical history (2015); other surgical history (2015 ); other surgical history (); and Diagnostic Cardiac Cath Lab Procedure (08/17/15). Treatment Diagnosis: Weakness      Assessment   Performance deficits / Impairments: Decreased functional mobility ; Decreased endurance;Decreased ADL status; Decreased balance;Decreased strength;Decreased posture;Decreased coordination  Assessment: 81 y/o F admitted to [facility] for rhabdomyolysis. Patient presents with weakness, deconditioning, and decreased balance, requiring increased need for assist during ADL. Patient would benefit from OT services to address to ensure safe and indep return home, alone. Treatment Diagnosis: Weakness  Prognosis: Good  Decision Making: Medium Complexity  REQUIRES OT FOLLOW-UP: Yes        Plan   Occupational Therapy Plan  Times Per Day:  Once a day  Days Per Week: 7 Days  Current Treatment Recommendations: Strengthening, Balance training, Functional mobility training, Endurance training, ROM, Safety education & training, Patient/Caregiver education & training, Equipment evaluation, education, & understanding;Demonstrated understanding                          AM-PAC - ADL  AM-PAC Daily Activity - Inpatient   How much help is needed for putting on and taking off regular lower body clothing?: A Little  How much help is needed for bathing (which includes washing, rinsing, drying)?: A Little  How much help is needed for toileting (which includes using toilet, bedpan, or urinal)?: A Little  How much help is needed for putting on and taking off regular upper body clothing?: A Little  How much help is needed for taking care of personal grooming?: A Little  How much help for eating meals?: None  AM-[facility] Inpatient Daily Activity Raw Score: 19  AM-PAC Inpatient ADL T-Scale Score : 40.22  ADL Inpatient CMS 0-100% Score: 42.8  ADL Inpatient CMS G-Code Modifier : CK        Goals  Short Term Goals  Time Frame for Short Term Goals: 21 visits  Short Term Goal 1: Patient to complete ADL routine c mod I (with exception of mary hose) to ensure safe and indep return home.  Short Term Goal 2: Patient to engage in 15 minutes of ther ex/ther act to improve strength and activity tolerance for I/ADL upon return home.  Short Term Goal 3: Patient to be educated on d/c folder, AE/DME and home safety to ensure safe return home.  Short Term Goal 4: Patient to engage in 10 minutes of dynamic standing during functional task of choice s LOB to improve standing tolerance, balance and safety during I/ADL.       Therapy Time   Individual Concurrent Group Co-treatment   Time In 1050         Time Out 1100         Minutes 10                 Chantelle Bhat OTR/L

## 2023-12-28 NOTE — PROGRESS NOTES
Comprehensive Nutrition Assessment    Type and Reason for Visit:  Initial    Nutrition Recommendations/Plan:   Easy to chew foods (not using dentures at meals)  Chocolate Ensure tid. Malnutrition Assessment:  Malnutrition Status: At risk for malnutrition (Comment) (12/28/23 1156)    Context:  Acute Illness     Findings of the 6 clinical characteristics of malnutrition:  Energy Intake:  No significant decrease in energy intake  Weight Loss:  No significant weight loss     Body Fat Loss:  No significant body fat loss     Muscle Mass Loss:  No significant muscle mass loss    Fluid Accumulation:  No significant fluid accumulation     Strength:  Not Performed    Nutrition Assessment:    Chewing difficulty r/t altered GI structure, AEB edentulous (not using dentures at meal time). Able to chew softer foods well but uncertain ability with whole meat portion of meal which he avoids while I'm visiting. Has also had weight losses over time that are unlikely intentional.  Uses some Ensure at home and will resume use here. Scabbed over cut on head noted. Elevated serum Na+ and renal indices. Will also alter consistency of diet to \"easy to chew\" for ease of ingestion. No tremor noted with parkinsons. Noted low vit D history not on supplement. Recommend check vit D and supplement accordingly    Nutrition Related Findings:    no edema. Wound Type: None       Current Nutrition Intake & Therapies:    Average Meal Intake: %  Average Supplements Intake: None Ordered  ADULT ORAL NUTRITION SUPPLEMENT; Breakfast, Lunch, Dinner; Standard High Calorie/High Protein Oral Supplement  ADULT DIET; Easy to Chew    Anthropometric Measures:  Height: 170.2 cm (5' 7\")  Ideal Body Weight (IBW): 148 lbs (67 kg)    Admission Body Weight: 89.4 kg (197 lb)  Current Body Weight: 75.2 kg (165 lb 12.8 oz), 112 % IBW.  Weight Source: Bed Scale  Current BMI (kg/m2): 26  Usual Body Weight: 76.4 kg (168 lb 6.4 oz) (3 months ago)  % Weight Change (Calculated): -1.5  Weight Adjustment For: No Adjustment                 BMI Categories: Overweight (BMI 25.0-29.9)    Estimated Daily Nutrient Needs:  Energy Requirements Based On: Kcal/kg  Weight Used for Energy Requirements: Current  Energy (kcal/day): 4815-7471 (20-25)  Weight Used for Protein Requirements: Ideal  Protein (g/day): 81-87 (1.2-1.3)  Method Used for Fluid Requirements: 1 ml/kcal  Fluid (ml/day): 1900    Nutrition Diagnosis:   Biting/chewing (masticatory) difficulty related to altered GI structure as evidenced by other (comment) (edentulous)    Lab Results   Component Value Date     (H) 12/28/2023    K 4.1 12/28/2023     (H) 12/28/2023    CO2 24 12/28/2023    BUN 45 (H) 12/28/2023    CREATININE 2.2 (H) 12/28/2023    GLUCOSE 82 12/28/2023    CALCIUM 9.1 12/28/2023    PROT 5.9 (L) 12/28/2023    LABALBU 2.9 (L) 12/28/2023    BILITOT 0.6 12/28/2023    ALKPHOS 117 12/28/2023    AST 68 (H) 12/28/2023    ALT 32 12/28/2023    LABGLOM 28 (L) 12/28/2023    GFRAA 39 (L) 05/10/2022     Hemoglobin A1C   Date Value Ref Range Status   02/28/2023 5.7 4.0 - 6.0 % Final     Lab Results   Component Value Date    VITD25 24.8 (L) 02/24/2016     Nutrition Interventions:   Food and/or Nutrient Delivery: Start Oral Nutrition Supplement, Modify Current Diet  Nutrition Education/Counseling: Education initiated  Coordination of Nutrition Care: Continue to monitor while inpatient  Plan of Care discussed with: patient    Goals:     Goals: Meet at least 75% of estimated needs       Nutrition Monitoring and Evaluation:   Behavioral-Environmental Outcomes: None Identified  Food/Nutrient Intake Outcomes: Food and Nutrient Intake, Supplement Intake  Physical Signs/Symptoms Outcomes: Biochemical Data, Chewing or Swallowing, Weight    Discharge Planning:    No discharge needs at this time     Hubert Jones RD, LD  Contact: [phone]

## 2023-12-28 NOTE — PROGRESS NOTES
Physical Therapy  Facility/Department: [facility] AT THE [facility] MED SURG  Daily Treatment Note  NAME: Deedee Wise  : 1934  MRN: [id number]    Date of Service: 2023    Discharge Recommendations:  Continue to assess pending progress, IP Rehab, Subacute/Skilled Nursing Facility        Patient Diagnosis(es): The primary encounter diagnosis was Traumatic rhabdomyolysis, initial encounter ([address] Diagnoses of Multiple contusions and Fall, initial encounter were also pertinent to this visit. Assessment   Assessment: Bed mobility completed with Farrukh-modA, transfers with Farrukh, gait 50ft with RW with CGA-Farrukh x 2 d/t reduced overall safety (especially with directional changes). pt with very poor safety when going to turn to sit in chair--required Farrukh x 2 to correct. Seated and reclined B LE therex x 10-15 each in all available planes of motion with cues for technique. Mild fatigue noted, will continue to progress as tolerated. Activity Tolerance: Patient tolerated treatment well;Patient limited by fatigue     Plan    Physical Therapy Plan  General Plan: 2 times a day 7 days a week (1x per day on weekends,)  Specific Instructions for Next Treatment: Once daily on weekends  Current Treatment Recommendations: Strengthening;ROM;Balance training;Transfer training;Neuromuscular re-education;Stair training;Functional mobility training;Gait training;Home exercise program;Therapeutic activities; Safety education & training;Patient/Caregiver education & training;Manual;Endurance training     Restrictions  Restrictions/Precautions  Restrictions/Precautions: General Precautions, Fall Risk     Subjective    Subjective  Subjective: in bed upon arrival, pleasant and agreeable to therapy  Pain: denied  Orientation  Overall Orientation Status: Impaired  Orientation Level: Oriented to person;Oriented to place  Cognition  Overall Cognitive Status: [facility]     Objective      Bed Mobility Training  Bed Mobility Training: Yes  Overall Level of

## 2023-12-28 NOTE — DISCHARGE INSTR - COC
Continuity of Care Form    Patient Name: Bob Trujillo   :  1934  MRN:  [id number]    Admit date:  2023  Discharge date:  2023    Code Status Order: Full Code   Advance Directives:     Admitting Physician:  Jada Sims MD  PCP: Kailee Loo MD    Discharging Nurse: Avinash Lind, [address] Unit/Room#: 7757/3825-33  Discharging Unit Phone Number: [phone]    Emergency Contact:   Extended Emergency Contact Information  Primary Emergency Contact: Rafa Syed [facility] of [address] [address] Phone: [phone]  Mobile Phone: [phone]  Relation: Child  Hearing or visual needs: None  Other needs: None  Preferred language: Joseph   needed?  No    Past Surgical History:  Past Surgical History:   Procedure Laterality Date    CORONARY ARTERY BYPASS GRAFT  08/21/2015     X3 W/ LIMA TO LAD    DIAGNOSTIC CARDIAC CATH LAB PROCEDURE  08/17/15    OTHER SURGICAL HISTORY  2015    endovascular vein harvesting    OTHER SURGICAL HISTORY  2015     normothermic cardiopulmonary bypass w/ cardioplegic arrest of the heart    OTHER SURGICAL HISTORY      gallstones removed, Lap       Immunization History:   Immunization History   Administered Date(s) Administered    COVID-19, PFIZER PURPLE top, DILUTE for use, (age 15 y+), 30mcg/0.3mL 2021, 2021, 2021    Influenza Vaccine, unspecified formulation 10/14/2014, 2016    Influenza Virus Vaccine 10/14/2015    Influenza, FLUAD, (age 72 y+), Adjuvanted, 0.5mL 10/13/2021    Influenza, FLUZONE (age 72 y+), High Dose, 0.7mL 2022    Influenza, High Dose (Fluzone 65 yrs and older) 2016, 10/04/2017, 10/16/2018, 2021    Influenza, Triv, inactivated, subunit, adjuvanted, IM (Fluad 65 yrs and older) 2019    Pneumococcal, PCV-13, PREVNAR 13, (age 6w+), IM, 0.5mL 2016    Pneumococcal, PPSV23, PNEUMOVAX 23, (age 2y+), SC/IM, 0.5mL 2004    Zoster Recombinant (Shingrix)

## 2023-12-28 NOTE — PROGRESS NOTES
Physical Therapy  Facility/Department: [facility] MED SURG  Physical Therapy Initial Assessment    Name: Jonathan Iglesias  : 1934  MRN: [id number]  Date of Service: 2023    Discharge Recommendations:  Continue to assess pending progress, IP Rehab, Subacute/Skilled Nursing Facility          Patient Diagnosis(es): The primary encounter diagnosis was Traumatic rhabdomyolysis, initial encounter ([facility]). Diagnoses of Multiple contusions and Fall, initial encounter were also pertinent to this visit.  Past Medical History:  has a past medical history of Acute renal insufficiency, Anxiety, ASHD (arteriosclerotic heart disease), Bipolar disorder ([facility]), Essential hypertension, Hyperlipidemia, Hypothyroidism, Localized osteoarthrosis, lower leg, and Parkinson's disease.  Past Surgical History:  has a past surgical history that includes Coronary artery bypass graft (2015 ); other surgical history (2015); other surgical history (2015 ); other surgical history (); and Diagnostic Cardiac Cath Lab Procedure (08/17/15).    Assessment   Assessment: Patient is 89 year old male with dx of rhabdomyolysis who presents with decreased B LE strength, decreased functional mobility and endurance and decreased safety and balance during transfers and ambulation. Pt to benefit from physical therapy to address all concerns and decrease fall risk.  Treatment Diagnosis: Difficulty walking  Specific Instructions for Next Treatment: Once daily on weekends  Therapy Prognosis: Good;Fair  Decision Making: Medium Complexity  Requires PT Follow-Up: Yes  Activity Tolerance  Activity Tolerance: Patient tolerated evaluation without incident;Patient limited by endurance;Treatment limited secondary to decreased cognition     Plan   Physical Therapy Plan  General Plan: 2 times a day 7 days a week  Specific Instructions for Next Treatment: Once daily on weekends  Current Treatment Recommendations: Strengthening, ROM, Balance  training, Transfer training, Neuromuscular re-education, Stair training, Functional mobility training, Gait training, Home exercise program, Therapeutic activities, Safety education & training, Patient/Caregiver education & training, Manual, Endurance training  Safety Devices  Type of Devices: All saravanan prominences offloaded, All fall risk precautions in place, Call light within reach, Left in bed, Nurse notified, Bed alarm in place, Gait belt     Restrictions  Restrictions/Precautions  Restrictions/Precautions: General Precautions, Fall Risk     Subjective   General  Chart Reviewed: Yes  Patient assessed for rehabilitation services?: Yes  Response To Previous Treatment: Not applicable  Family / Caregiver Present: Yes  Referring Practitioner: Dr. Mandy Mcmahon MD  Referral Date : 12/27/23  Diagnosis: Rhabdomyolysis, M62.82  Follows Commands: Within Functional Limits  Subjective  Subjective: Pt denies pain at this time and agrees to PT eval         Social/Functional History  Social/Functional History  Lives With: Alone  Type of Home: Apartment  Home Access: Stairs to enter with rails  Entrance Stairs - Number of Steps: 13  Entrance Stairs - Rails: Right  Bathroom Shower/Tub: Walk-in shower  Bathroom Toilet: Handicap height  Bathroom Equipment: Grab bars in shower, Shower chair  Home Equipment: Walker, rolling  Has the patient had two or more falls in the past year or any fall with injury in the past year?: Yes  ADL Assistance: Independent  Homemaking Assistance: Needs assistance  Ambulation Assistance: Independent  Transfer Assistance: Independent  Additional Comments: Pt has had a lot of falls over the past week per son and was found laying on the floor for approximately 9 hours.  Son notes decrease in cognition/short term memory  recently as well  Vision/Hearing  Vision  Vision: Impaired  Vision Exceptions: Wears glasses for reading  Hearing  Hearing: Within functional limits    Cognition   Orientation  Orientation Level:

## 2023-12-28 NOTE — CONSULTS
Urology Consultation    Patient:  Autumn Gruber  MRN: [id number]  YOB: 1934  Consult requested from Dr. Dae Zazueta for purpose of evaluation of urinary retention. CHIEF COMPLAINT:  urinary retnetion    HISTORY OF PRESENT ILLNESS:   The patient is a 80 y.o. male who presents with after a fall. Patient was admitted last night after a fall. Patient does have issues with falling especially he does not use his walker. Patient was found to have a slightly elevated creatinine at 2.4. Patient has an established treatment plan with urology. We do see the patient approximately every 6 months. He is taking Flomax and finasteride. Patient did also have elevated postvoid residuals. His postvoid residuals will run about 400 mL or so. Patient was able to void earlier today. He did have a Rivero catheter placed approximately 800 mL. Primary team did increase his Flomax to twice daily. Patient is not currently having any gross hematuria or leakage around the catheter. He reports no pain with the catheter. Patient's old records, notes and chart reviewed and summarized above.     Past Medical History:    Past Medical History:   Diagnosis Date    Acute renal insufficiency 10/5/2015    Anxiety 10/14/2015    ASHD (arteriosclerotic heart disease) 10/14/2015    Bipolar disorder ([address]) 1976    Essential hypertension 9/23/2016    Hyperlipidemia     Hypothyroidism     Localized osteoarthrosis, lower leg 10/14/2015    Parkinson's disease     light, 2010       Past Surgical History:    Past Surgical History:   Procedure Laterality Date    CORONARY ARTERY BYPASS GRAFT  08/21/2015     X3 W/ LIMA TO LAD    DIAGNOSTIC CARDIAC CATH LAB PROCEDURE  08/17/15    OTHER SURGICAL HISTORY  08/21/2015    endovascular vein harvesting    OTHER SURGICAL HISTORY  08/21/2015     normothermic cardiopulmonary bypass w/ cardioplegic arrest of the heart    OTHER SURGICAL HISTORY  1994    gallstones removed, Lap       Medications: (Medical): No     Lack of Transportation (Non-Medical): No   Physical Activity: Insufficiently Active (5/24/2022)    Exercise Vital Sign     Days of Exercise per Week: 1 day     Minutes of Exercise per Session: 20 min   Stress: Not on file   Social Connections: Not on file   Intimate Partner Violence: Not on file   Housing Stability: Low Risk  (12/27/2023)    Housing Stability Vital Sign     Unable to Pay for Housing in the Last Year: No     Number of Places Lived in the Last Year: 1     Unstable Housing in the Last Year: No       Family History:    Family History   Problem Relation Age of Onset    Depression Mother     Heart Attack Mother     Breast Cancer Sister        ROS:  Constitutional:  Negative for appetite change, chills and fever.   Eyes:  Negative for redness and visual disturbance.   Respiratory:  Negative for cough, shortness of breath and wheezing.    Cardiovascular:  Negative for chest pain and leg swelling.   Gastrointestinal:  Negative for abdominal pain, constipation, nausea and vomiting.   Genitourinary:  Negative for decreased urine volume, difficulty urinating, dysuria, enuresis, flank pain, frequency, hematuria, penile discharge, penile pain, scrotal swelling, testicular pain and urgency.   Musculoskeletal:  Negative for back pain, joint swelling and myalgias.   Skin:  Negative for color change, rash and wound.   Neurological:  Negative for dizziness, tremors and numbness.   Hematological:  Negative for adenopathy. Does not bruise/bleed easily.     Physical Exam:    This a 89 y.o. male   Patient Vitals for the past 24 hrs:   BP Temp Temp src Pulse Resp SpO2 Height Weight   12/28/23 1148 -- -- -- -- -- -- 1.702 m (5' 7\") --   12/28/23 0700 (!) 135/91 98 °F (36.7 °C) Temporal 79 18 93 % -- --   12/28/23 0500 -- -- -- -- -- -- 1.702 m (5' 7\") 75.2 kg (165 lb 12.8 oz)   12/28/23 0200 -- -- -- 69 -- -- -- --   12/28/23 0000 -- -- -- 76 -- -- -- --   12/27/23 2300 121/63 98 °F (36.7 °C) Temporal 84

## 2023-12-29 ENCOUNTER — APPOINTMENT (OUTPATIENT)
Dept: NUCLEAR MEDICINE | Age: 88
DRG: 558 | End: 2023-12-29
Payer: MEDICARE

## 2023-12-29 ENCOUNTER — APPOINTMENT (OUTPATIENT)
Age: 88
DRG: 558 | End: 2023-12-29
Attending: FAMILY MEDICINE
Payer: MEDICARE

## 2023-12-29 ENCOUNTER — HOSPITAL ENCOUNTER (INPATIENT)
Age: 88
DRG: 558 | End: 2023-12-29
Payer: MEDICARE

## 2023-12-29 PROBLEM — I95.1 ORTHOSTATIC HYPOTENSION: Status: ACTIVE | Noted: 2023-12-29

## 2023-12-29 PROBLEM — R55 SYNCOPE: Status: ACTIVE | Noted: 2023-12-29

## 2023-12-29 LAB
ALBUMIN SERPL-MCNC: 3 G/DL (ref 3.5–5.2)
ALBUMIN/GLOB SERPL: 1 {RATIO} (ref 1–2.5)
ALP SERPL-CCNC: 125 U/L (ref 40–129)
ALT SERPL-CCNC: 36 U/L (ref 5–41)
ANION GAP SERPL CALCULATED.3IONS-SCNC: 8 MMOL/L (ref 9–17)
AST SERPL-CCNC: 62 U/L
BASOPHILS # BLD: 0.04 K/UL (ref 0–0.2)
BASOPHILS NFR BLD: 1 % (ref 0–2)
BILIRUB SERPL-MCNC: 0.4 MG/DL (ref 0.3–1.2)
BUN SERPL-MCNC: 38 MG/DL (ref 8–23)
BUN/CREAT SERPL: 17 (ref 9–20)
CALCIUM SERPL-MCNC: 8.9 MG/DL (ref 8.6–10.4)
CHLORIDE SERPL-SCNC: 118 MMOL/L (ref 98–107)
CK SERPL-CCNC: 613 U/L (ref 39–308)
CO2 SERPL-SCNC: 25 MMOL/L (ref 20–31)
CREAT SERPL-MCNC: 2.3 MG/DL (ref 0.7–1.2)
EOSINOPHIL # BLD: 0.5 K/UL (ref 0–0.44)
EOSINOPHILS RELATIVE PERCENT: 6 % (ref 1–4)
ERYTHROCYTE [DISTWIDTH] IN BLOOD BY AUTOMATED COUNT: 13.9 % (ref 11.8–14.4)
GFR SERPL CREATININE-BSD FRML MDRD: 26 ML/MIN/1.73M2
GLUCOSE SERPL-MCNC: 96 MG/DL (ref 70–99)
HCT VFR BLD AUTO: 38 % (ref 40.7–50.3)
HGB BLD-MCNC: 12.4 G/DL (ref 13–17)
IMM GRANULOCYTES # BLD AUTO: 0.03 K/UL (ref 0–0.3)
IMM GRANULOCYTES NFR BLD: 0 %
LYMPHOCYTES NFR BLD: 1.36 K/UL (ref 1.1–3.7)
LYMPHOCYTES RELATIVE PERCENT: 16 % (ref 24–43)
MCH RBC QN AUTO: 33.9 PG (ref 25.2–33.5)
MCHC RBC AUTO-ENTMCNC: 32.6 G/DL (ref 28.4–34.8)
MCV RBC AUTO: 103.8 FL (ref 82.6–102.9)
MONOCYTES NFR BLD: 0.85 K/UL (ref 0.1–1.2)
MONOCYTES NFR BLD: 10 % (ref 3–12)
NEUTROPHILS NFR BLD: 67 % (ref 36–65)
NEUTS SEG NFR BLD: 5.77 K/UL (ref 1.5–8.1)
NRBC BLD-RTO: 0 PER 100 WBC
PLATELET # BLD AUTO: 231 K/UL (ref 138–453)
PMV BLD AUTO: 10.6 FL (ref 8.1–13.5)
POTASSIUM SERPL-SCNC: 3.8 MMOL/L (ref 3.7–5.3)
PROT SERPL-MCNC: 6.1 G/DL (ref 6.4–8.3)
RBC # BLD AUTO: 3.66 M/UL (ref 4.21–5.77)
SODIUM SERPL-SCNC: 151 MMOL/L (ref 135–144)
WBC OTHER # BLD: 8.6 K/UL (ref 3.5–11.3)

## 2023-12-29 PROCEDURE — 85025 COMPLETE CBC W/AUTO DIFF WBC: CPT

## 2023-12-29 PROCEDURE — 6370000000 HC RX 637 (ALT 250 FOR IP): Performed by: STUDENT IN AN ORGANIZED HEALTH CARE EDUCATION/TRAINING PROGRAM

## 2023-12-29 PROCEDURE — 78452 HT MUSCLE IMAGE SPECT MULT: CPT

## 2023-12-29 PROCEDURE — 97535 SELF CARE MNGMENT TRAINING: CPT

## 2023-12-29 PROCEDURE — 6370000000 HC RX 637 (ALT 250 FOR IP): Performed by: NURSE PRACTITIONER

## 2023-12-29 PROCEDURE — 97110 THERAPEUTIC EXERCISES: CPT

## 2023-12-29 PROCEDURE — 3430000000 HC RX DIAGNOSTIC RADIOPHARMACEUTICAL: Performed by: FAMILY MEDICINE

## 2023-12-29 PROCEDURE — 2580000003 HC RX 258: Performed by: NURSE PRACTITIONER

## 2023-12-29 PROCEDURE — 80053 COMPREHEN METABOLIC PANEL: CPT

## 2023-12-29 PROCEDURE — 6360000002 HC RX W HCPCS: Performed by: FAMILY MEDICINE

## 2023-12-29 PROCEDURE — A9500 TC99M SESTAMIBI: HCPCS | Performed by: FAMILY MEDICINE

## 2023-12-29 PROCEDURE — 97116 GAIT TRAINING THERAPY: CPT

## 2023-12-29 PROCEDURE — 94761 N-INVAS EAR/PLS OXIMETRY MLT: CPT

## 2023-12-29 PROCEDURE — 2580000003 HC RX 258: Performed by: STUDENT IN AN ORGANIZED HEALTH CARE EDUCATION/TRAINING PROGRAM

## 2023-12-29 PROCEDURE — 82550 ASSAY OF CK (CPK): CPT

## 2023-12-29 PROCEDURE — 92526 ORAL FUNCTION THERAPY: CPT

## 2023-12-29 PROCEDURE — 1200000000 HC SEMI PRIVATE

## 2023-12-29 PROCEDURE — 6360000002 HC RX W HCPCS: Performed by: STUDENT IN AN ORGANIZED HEALTH CARE EDUCATION/TRAINING PROGRAM

## 2023-12-29 PROCEDURE — 93306 TTE W/DOPPLER COMPLETE: CPT

## 2023-12-29 PROCEDURE — 36415 COLL VENOUS BLD VENIPUNCTURE: CPT

## 2023-12-29 PROCEDURE — 99233 SBSQ HOSP IP/OBS HIGH 50: CPT | Performed by: FAMILY MEDICINE

## 2023-12-29 RX ORDER — DEXTROSE MONOHYDRATE 50 MG/ML
INJECTION, SOLUTION INTRAVENOUS CONTINUOUS
Status: DISCONTINUED | OUTPATIENT
Start: 2023-12-29 | End: 2023-12-31 | Stop reason: HOSPADM

## 2023-12-29 RX ORDER — MIDODRINE HYDROCHLORIDE 5 MG/1
5 TABLET ORAL 2 TIMES DAILY WITH MEALS
Status: DISCONTINUED | OUTPATIENT
Start: 2023-12-29 | End: 2023-12-31 | Stop reason: HOSPADM

## 2023-12-29 RX ORDER — TETRAKIS(2-METHOXYISOBUTYLISOCYANIDE)COPPER(I) TETRAFLUOROBORATE 1 MG/ML
30 INJECTION, POWDER, LYOPHILIZED, FOR SOLUTION INTRAVENOUS
Status: COMPLETED | OUTPATIENT
Start: 2023-12-29 | End: 2023-12-29

## 2023-12-29 RX ORDER — TETRAKIS(2-METHOXYISOBUTYLISOCYANIDE)COPPER(I) TETRAFLUOROBORATE 1 MG/ML
10 INJECTION, POWDER, LYOPHILIZED, FOR SOLUTION INTRAVENOUS
Status: COMPLETED | OUTPATIENT
Start: 2023-12-29 | End: 2023-12-29

## 2023-12-29 RX ORDER — REGADENOSON 0.08 MG/ML
0.4 INJECTION, SOLUTION INTRAVENOUS
Status: COMPLETED | OUTPATIENT
Start: 2023-12-29 | End: 2023-12-29

## 2023-12-29 RX ORDER — AMIODARONE HYDROCHLORIDE 200 MG/1
100 TABLET ORAL DAILY
Status: DISCONTINUED | OUTPATIENT
Start: 2023-12-30 | End: 2023-12-31 | Stop reason: HOSPADM

## 2023-12-29 RX ADMIN — OXYCODONE HYDROCHLORIDE AND ACETAMINOPHEN 1000 MG: 500 TABLET ORAL at 20:36

## 2023-12-29 RX ADMIN — BUPROPION HYDROCHLORIDE 100 MG: 100 TABLET, FILM COATED, EXTENDED RELEASE ORAL at 20:36

## 2023-12-29 RX ADMIN — Medication 10 MILLICURIE: at 09:01

## 2023-12-29 RX ADMIN — MIDODRINE HYDROCHLORIDE 5 MG: 5 TABLET ORAL at 16:49

## 2023-12-29 RX ADMIN — OXYCODONE HYDROCHLORIDE AND ACETAMINOPHEN 1000 MG: 500 TABLET ORAL at 11:16

## 2023-12-29 RX ADMIN — REGADENOSON 0.4 MG: 0.08 INJECTION, SOLUTION INTRAVENOUS at 09:43

## 2023-12-29 RX ADMIN — Medication 30 MILLICURIE: at 09:01

## 2023-12-29 RX ADMIN — AMIODARONE HYDROCHLORIDE 200 MG: 200 TABLET ORAL at 11:16

## 2023-12-29 RX ADMIN — DEXTROSE MONOHYDRATE: 50 INJECTION, SOLUTION INTRAVENOUS at 07:19

## 2023-12-29 RX ADMIN — TAMSULOSIN HYDROCHLORIDE 0.4 MG: 0.4 CAPSULE ORAL at 11:17

## 2023-12-29 RX ADMIN — LEVOTHYROXINE SODIUM 88 MCG: 0.09 TABLET ORAL at 11:16

## 2023-12-29 RX ADMIN — MIDODRINE HYDROCHLORIDE 5 MG: 5 TABLET ORAL at 11:16

## 2023-12-29 RX ADMIN — BUPROPION HYDROCHLORIDE 100 MG: 100 TABLET, FILM COATED, EXTENDED RELEASE ORAL at 11:16

## 2023-12-29 RX ADMIN — MULTIVITAMIN TABLET 1 TABLET: TABLET at 11:17

## 2023-12-29 RX ADMIN — FINASTERIDE 5 MG: 5 TABLET, FILM COATED ORAL at 11:16

## 2023-12-29 RX ADMIN — TAMSULOSIN HYDROCHLORIDE 0.4 MG: 0.4 CAPSULE ORAL at 20:36

## 2023-12-29 RX ADMIN — ENOXAPARIN SODIUM 30 MG: 100 INJECTION SUBCUTANEOUS at 11:18

## 2023-12-29 RX ADMIN — SODIUM CHLORIDE: 9 INJECTION, SOLUTION INTRAVENOUS at 02:15

## 2023-12-29 NOTE — PROGRESS NOTES
Physician Progress Note      PATIENT:               Andrzej Hurd  CSN #:                  [id number]  :                       1934  ADMIT DATE:       2023 10:33 AM  DISCH DATE:  RESPONDING  PROVIDER #:        Bertha Mortensen MD          QUERY TEXT:    Pt admitted with rhabdomyolysis. Presented to ED with concerns regarding a   fall, lost balance and hit back (mid thoracic). Total -> 850-> 613, myoglobin 1517    If possible, please document in progress notes and discharge summary if you   are evaluating and/or treating any of the following: The medical record reflects the following:  Risk Factors: Hx recurrent falls, reported fall, hitting mid thoracic back  Clinical Indicators: Total -> 850-> 613, myoglobin 76-> 74-> 88  Treatment: IVF,  hold atorvastatin, PT/OT eval, lab monitoring    Per [email or web address]  Traumatic rhabdomyolysis cause examples: crush syndrome, prolonged   immobilization  Nontraumatic rhabdomyolysis cause examples:  marked exertion, hyperthermia,   metabolic myopathy, drugs or toxins, infections, electrolyte disorders. SYLVESTER Zaidi, RN, CCDS, [facility]  Clinical   .  Options provided:  -- Traumatic rhabdomyolysis  -- Nontraumatic rhabdomyolysis  -- Other - I will add my own diagnosis  -- Disagree - Not applicable / Not valid  -- Disagree - Clinically unable to determine / Unknown  -- Refer to Clinical Documentation Reviewer    PROVIDER RESPONSE TEXT:    This patient has nontraumatic rhabdomyolysis.     Query created by: Raymundo Fraser on 2023 3:56 PM      Electronically signed by:  Bertha Mortensen MD 2023 5:05 PM

## 2023-12-29 NOTE — PROGRESS NOTES
[facility]    Facility/Department: [facility] AT THE [facility] MED SURG    Speech Language Pathology    Clinical Dysphagia Treatment    NAME:Jonathan Iglesias    : 1934 (80 y.o.)    MRN: [id number]    ROOM: [id number]    ADMISSION DATE: 2023    PATIENT DIAGNOSIS(ES): Rhabdomyolysis [M62.82]  Multiple contusions [T07. XXXA]  Fall, initial encounter [id number]. XXXA]  Traumatic rhabdomyolysis, initial encounter ([address]) Viji Ull[place]. 6XXA]    Chief Complaint   Patient presents with    Fall     Pt reports lost his balance yesterday and fell hitting head. Laceration noted to forehead. Denies loc and no blood thinners. Today complaint of mid back pain and pain to neck when twisting it. Family reports lives in a second story apartment and wanted him evaluated before leaving him alone.         Patient Active Problem List    Diagnosis Date Noted    Incomplete bladder emptying 2022    CKD (chronic kidney disease) stage 4, GFR 15-29 ml/min ([address]) 2022    Chronic renal disease, stage III ([address]) [134430] 2022    Orthostatic hypotension 2023    Syncope 2023    Generalized weakness 2023    Rhabdomyolysis 2023    LVH (left ventricular hypertrophy) 10/04/2017    Detrusor instability of bladder 2017    Hyperglycemia 2017    Hyperlipidemia 2017    Chronic renal failure, stage 3 (moderate) ([address]) 2017    Avulsion fracture 2016    Essential hypertension 2016    Localized osteoarthrosis, lower leg 10/14/2015    ASHD (arteriosclerotic heart disease) 10/14/2015    Insomnia 10/14/2015    Anxiety 10/14/2015    BPH with obstruction/lower urinary tract symptoms 10/05/2015    Hypothyroidism 2015    Bipolar 1 disorder ([address]) 2015       Past Medical History:   Diagnosis Date    Acute renal insufficiency 10/5/2015    Anxiety 10/14/2015    ASHD (arteriosclerotic heart disease) 10/14/2015    Bipolar disorder ([address]) 1976    Essential hypertension 2016    Hyperlipidemia cough. Suspected pre-spillage noted with thin liquids. Patient demonstrated improved control with nectar thick.)  Type of Impairment: Delayed  Propulsion: Discoordination  Oral Residue: 10-50% of bolus  Initiation of Swallow: Delayed (# of seconds)  Laryngeal Elevation: Functional  Aspiration Signs/Symptoms: Throat clear;Strong cough;Watery eyes  Pharyngeal Phase Characteristics: Suspected pharyngeal residue;Altered vocal quality  Effective Modifications: Alternate liquids/solids;Small sips and bites  Cues for Modifications: Moderate to maximal  Comments: SLP recommends patient receive moderate-maximum cueing during meal times to encourage small bites/sips and alternating of liquids/solids.      Dysphagia Diagnosis    Dysphagia Diagnosis: Mild to moderate pharyngeal stage dysphagia;Moderate oral stage dysphagia  Dysphagia Impression : Patient demonstrates s/sx aspiration with thin liquids and is being downgraded to nectar thick, as he is able to control them better. Patient demonstrates functional manipulation of easy to chew foods, but requires mod-max verbal/visual cueing to avoid shoveling of foods. SLP recommends patient complete MBSS.    Dysphagia Outcome Severity Scale: Level 3: Moderate dysphagia- Total assistance, supervision or strategies. Two or more diet consistencies restricted    Recommendations    Requires SLP Intervention: Yes  Recommendations: Assistance with meals;Dysphagia treatment;Supervision with meals;Modified barium swallow study  D/C Recommendations: Ongoing speech therapy is recommended during this hospitalization;Ongoing speech therapy is recommended at next level of care  Diet Solids Recommendation: Easy to Chew  Liquid Consistency Recommendation: Mildly Thick (Nectar)  Compensatory Swallowing Strategies : Alternate solids and liquids;Eat/Feed slowly;Assist feed;Remain upright for 30-45 minutes after meals;No straws;Small bites/sips  Recommended Form of Meds: Crushed in puree as  able  Therapeutic Interventions: Oral motor exercises;Therapeutic PO trials with SLP;Bolus control exercises  Duration of Treatment: 7 days  Frequency of Treatment: daily    Prognosis    Prognosis: Good    Education    Individuals consulted  Consulted and agree with results and recommendations: RN;Family member  Family member consulted: Son  RN Name: Leti    Patient Education: Patient and son educated on status of foods/liquids.    Treatment/Goals    Short-term Goals  Timeframe for Short-term Goals: 7 days  Goal 1: The patient will tolerate liquids with a straw without signs/symptoms of aspiration/penetration.  Goal 2: The patient will tolerate solid foods without signs/symptoms of aspiration/penetration.  Long-term Goals  Timeframe for Long-term Goals: 14 days  Goal 1: The patient will tolerate recommended diet for duration of stay.    Safety Devices    Safety Devices  Safety Devices in place: Yes  Type of devices: All fall risk precautions in place    Pain Assessment    Pain Assessment: Patient does not c/o pain    Pain Re-assessment    Pain Reassessment: Patient does not c/o pain    Therapy Time    SLP Individual Minutes  Time In: 1145  Time Out: 1220  Minutes: 35    Patient completed trials with easy to chew foods during lunch and thin liquids presented via cup and straw as well as nectar thick liquids presented via cup. Patient demonstrated coughing and choking with thin liquids this date. SLP recommends patient be downgraded to nectar thick liquid, as he demonstrated improved control and no s/sx aspiration. SLP also recommends patient drink via cup only and avoid straws. Patient demonstrated functional manipulation with easy to chew consistencies without s/sx aspiration, but requires mod-max verbal and visual cueing during meal times to take small bites/sips and eat/drink at a safe pace.    Electronically signed: Jennifer QUEEN-SLP             12/29/2023

## 2023-12-29 NOTE — PROGRESS NOTES
Facsimile Transmission Cover Sheet    Information contained in this transmission is for the sole use of the intended recipients and may contain confidential and privileged information. Any unauthorized review, use, disclosure or distribution is prohibited. If you are not the intended recipient, please contact the sender and destroy all copies of the original message. Disclosure is made for the purpose of healthcare operations and continuity of care. To: __Dr. Dunn________________________    From: Speech Therapy       Sender:_Jennifer QUEEN-SLP  _________________ Phone Number: [phone] (Carolina Deejay)    Christoph Weathers current unit  [x][facility] [phone]  [phone]    Your bedside evaluation order for Christoph Weathers has been completed. Based on the results speech therapy recommends:     Easy to Chew  [place] Thick Liquids  MBSS    If you agree please enter the new diet order in [facility] DIANA or telephone the nursing unit. Diet will not change without your order. Thank you,  Electronically signed by: Aimee MAGANA CF-SLP

## 2023-12-29 NOTE — PROGRESS NOTES
Physical Therapy  Facility/Department: [facility] THE [facility] MED SURG  Daily Treatment Note    NAME: Deleta Floor  : 1934  MRN: [id number]    Date of Service: 2023    Discharge Recommendations:  Continue to assess pending progress, IP Rehab, Subacute/Skilled Nursing Facility        Patient Diagnosis(es): The primary encounter diagnosis was Traumatic rhabdomyolysis, initial encounter ([address]). Diagnoses of Multiple contusions, Fall, initial encounter, NSVT (nonsustained ventricular tachycardia) ([address]), ASHD (arteriosclerotic heart disease), Recurrent syncope, and Essential hypertension were also pertinent to this visit. Assessment   Assessment: Bed Mobs Min Ax 1. Transfers CGA x1 and SBA x1 for safety due to decreased safety awareness at times. Gait x25' with 2WW, CGA x1 and SBA x1 for safety. Slow shuffled gait, FF posture, no LOB. Seated B LE ther ex x15-20 in all planes with demo provided. Activity Tolerance: Patient limited by fatigue;Patient tolerated treatment well     Plan    Physical Therapy Plan  General Plan: 2 times a day 7 days a week (1x/day on weekends and holidays)  Current Treatment Recommendations: Strengthening;ROM;Balance training;Transfer training;Neuromuscular re-education;Stair training;Functional mobility training;Gait training;Home exercise program;Therapeutic activities; Safety education & training;Patient/Caregiver education & training;Manual;Endurance training     Restrictions  Restrictions/Precautions  Restrictions/Precautions: General Precautions, Fall Risk     Subjective    Subjective  Subjective: Pt in bed upon arrival with son present, agreeable to therapy and reports he just finished his lunch.   Pain: no c/o pain  Orientation  Overall Orientation Status: Impaired  Orientation Level: Unable to assess  Cognition  Overall Cognitive Status: WFL     Objective   Bed Mobility Training  Bed Mobility Training: Yes  Overall Level of Assistance: Minimum assistance;Assist X1  Interventions: for technique and improved safety.  Education Method: Verbal;Demonstration;Teach Back  Barriers to Learning: Cognition;None  Education Outcome: Verbalized understanding;Continued education needed;Demonstrated understanding      Therapy Time   Individual Concurrent Group Co-treatment   Time In 1234         Time Out 1258         Minutes 24            Christa Edward, PTA

## 2023-12-29 NOTE — PROGRESS NOTES
Patient attempting to get out of bed multiple time to urinate- writer reeducates patient on stiles catheter and no need to leave bed to urinate. Writer questioned patient about needs for bowel movement, currently denies. Plan of care on going.

## 2023-12-29 NOTE — PLAN OF CARE
Problem: Discharge Planning  Goal: Discharge to home or other facility with appropriate resources  Outcome: Progressing  Flowsheets (Taken 12/29/2023 0432)  Discharge to home or other facility with appropriate resources:   Identify discharge learning needs (meds, wound care, etc)   Identify barriers to discharge with patient and caregiver   Refer to discharge planning if patient needs post-hospital services based on physician order or complex needs related to functional status, cognitive ability or social support system   Arrange for interpreters to assist at discharge as needed   Arrange for needed discharge resources and transportation as appropriate     Problem: Safety - Adult  Goal: Free from fall injury  Outcome: Progressing  Flowsheets (Taken 12/29/2023 0432)  Free From Fall Injury:   Based on caregiver fall risk screen, instruct family/caregiver to ask for assistance with transferring infant if caregiver noted to have fall risk factors   Instruct family/caregiver on patient safety     Problem: ABCDS Injury Assessment  Goal: Absence of physical injury  Outcome: Progressing  Flowsheets (Taken 12/29/2023 0432)  Absence of Physical Injury: Implement safety measures based on patient assessment     Problem: Pain  Goal: Verbalizes/displays adequate comfort level or baseline comfort level  Outcome: Progressing  Flowsheets (Taken 12/29/2023 0432)  Verbalizes/displays adequate comfort level or baseline comfort level:   Encourage patient to monitor pain and request assistance   Administer analgesics based on type and severity of pain and evaluate response   Consider cultural and social influences on pain and pain management   Implement non-pharmacological measures as appropriate and evaluate response   Notify Licensed Independent Practitioner if interventions unsuccessful or patient reports new pain   Assess pain using appropriate pain scale     Problem: Nutrition Deficit:  Goal: Optimize nutritional status  Outcome:

## 2023-12-29 NOTE — PROGRESS NOTES
Occupational Therapy  Facility/Department: [facility] AT THE [facility] MED SURG  Daily Treatment Note  NAME: Yulisa Turner  : 1934  MRN: [id number]    Date of Service: 2023    Discharge Recommendations:  Continue to assess pending progress         Patient Diagnosis(es): The primary encounter diagnosis was Traumatic rhabdomyolysis, initial encounter ([address]). Diagnoses of Multiple contusions, Fall, initial encounter, NSVT (nonsustained ventricular tachycardia) ([address]), ASHD (arteriosclerotic heart disease), and Recurrent syncope were also pertinent to this visit. Assessment    Activity Tolerance: Patient limited by fatigue  Discharge Recommendations: Continue to assess pending progress      Plan   Occupational Therapy Plan  Times Per Day: Once a day  Days Per Week: 7 Days  Current Treatment Recommendations: Strengthening;Balance training;Functional mobility training; Endurance training;ROM;Safety education & training;Patient/Caregiver education & training;Equipment evaluation, education, & procurement;Self-Care / ADL     Restrictions   Restrictions/Precautions  Restrictions/Precautions: General Precautions; Fall Risk    Subjective   Subjective  Subjective: Upon therapist's arrival, pt was seated in bedside chair, stating \"I have to go to the bathroom. \" PT/OT arrived and educated on use of catheter. Pt agreeable to OT treatment session this date. Pain: Pt denied pain this date. Orientation  Overall Orientation Status: Impaired  Orientation Level: Unable to assess  Pain: denied  Cognition  Overall Cognitive Status: WFL        Objective    ADL  Functional Mobility: Minimal assistance  Functional Mobility Skilled Clinical Factors: min A c FWW - LOB c min A for correction  Additional Comments: Pt engaged in sit>stand transfer from bedside chair, Farrukh x2 for safety and line management. Ambulated in room>hallway, stating \"I am weak<\" requested bed upon return to room. Attempted ADLs seated EOB, pt declined this date.  Returned pt

## 2023-12-29 NOTE — PROGRESS NOTES
Vitals and shift assessment complete- see flow sheet for details. Family members at bedside, plan of care reviewed and questions answered. Patient currently denies pain and discomfort. Patient is alert and oriented x4 calm and cooperative. Patient assisted from chair to bed +2 assist with walker- tolerating ambulation well. No additional requests at this time. Call light in reach, bed in lowest position and alarm engaged to promote patient safety. Plan of care on going.

## 2023-12-29 NOTE — CONSULTS
Patient: Christoph Weathers  : 1934  Date of Admission: 2023  Primary Care Physician: Serg Or Date: 2023    REASON FOR CONSULTATION: Fall (Pt reports lost his balance yesterday and fell hitting head. Laceration noted to forehead. Denies loc and no blood thinners. Today complaint of mid back pain and pain to neck when twisting it. Family reports lives in a second story apartment and wanted him evaluated before leaving him alone. )    HPI: Mr. Peyton Conn is a 80 y.o. male who was admitted to the hospital with a fall leading to this consultation. Mr. Peyton Conn also denied any current or recent chest pain, shortness of breath, abdominal pain, bleeding problems, problems with his medications or any other concerns at this time. Past Medical History:   Diagnosis Date    Acute renal insufficiency 10/5/2015    Anxiety 10/14/2015    ASHD (arteriosclerotic heart disease) 10/14/2015    Bipolar disorder ([address]) 1976    Essential hypertension 2016    Hyperlipidemia     Hypothyroidism     Localized osteoarthrosis, lower leg 10/14/2015    Parkinson's disease     light,        CURRENT ALLERGIES: Patient has no known allergies. REVIEW OF SYSTEMS: 14 systems were reviewed. Pertinent positives and negatives as above, all else negative.      Past Surgical History:   Procedure Laterality Date    CORONARY ARTERY BYPASS GRAFT  08/21/2015     X3 W/ LIMA TO LAD    DIAGNOSTIC CARDIAC CATH LAB PROCEDURE  08/17/15    OTHER SURGICAL HISTORY  2015    endovascular vein harvesting    OTHER SURGICAL HISTORY  2015     normothermic cardiopulmonary bypass w/ cardioplegic arrest of the heart    OTHER SURGICAL HISTORY      gallstones removed, Lap    Social History:  Social History     Tobacco Use    Smoking status: Former     Current packs/day: 0.00     Types: Cigarettes     Quit date: 1978     Years since quittin.0    Smokeless tobacco: Never   Vaping Use    Vaping Use: Never

## 2023-12-29 NOTE — PROGRESS NOTES
Physical Therapy  Facility/Department: [facility] AT THE [facility] MED SURG  Daily Treatment Note  NAME: Higinio Dean  : 1934  MRN: [id number]    Date of Service: 2023    Discharge Recommendations:  Continue to assess pending progress, IP Rehab, Subacute/Skilled Nursing Facility        Patient Diagnosis(es): The primary encounter diagnosis was Traumatic rhabdomyolysis, initial encounter ([address]). Diagnoses of Multiple contusions, Fall, initial encounter, NSVT (nonsustained ventricular tachycardia) ([address]), ASHD (arteriosclerotic heart disease), and Recurrent syncope were also pertinent to this visit. Assessment   Assessment: Reduced safety--2 assist recommended. Bed mobility completed with minAx2, transfers with Farrukh x2, gait 50ft with RW with CGA-Farrukh x 2 d/t reduced overall safety (especially with directional changes). pt with very poor safety when going to turn to sit down--required Farrukh x 2 to correct. Patient declined therex, stating he was too tired. Activity Tolerance: Patient limited by fatigue     Plan    Physical Therapy Plan  General Plan: 2 times a day 7 days a week (1x per day on weekends and holidays.)  Current Treatment Recommendations: Strengthening;ROM;Balance training;Transfer training;Neuromuscular re-education;Stair training;Functional mobility training;Gait training;Home exercise program;Therapeutic activities; Safety education & training;Patient/Caregiver education & training;Manual;Endurance training     Restrictions  Restrictions/Precautions  Restrictions/Precautions: General Precautions, Fall Risk     Subjective    Subjective  Subjective: in chair upon arrival, pleasant and agreeable to therapy  Pain: denied  Orientation  Overall Orientation Status: Impaired  Orientation Level: Unable to assess  Cognition  Overall Cognitive Status: WFL     Objective      Bed Mobility Training  Bed Mobility Training: Yes  Overall Level of Assistance: Assist X2;Minimum assistance  Interventions: Demonstration;Tactile cues;Verbal cues  Rolling: Assist X2;Minimum assistance  Sit to Supine: Assist X2;Minimum assistance  Scooting: Assist X2;Minimum assistance  Transfer Training  Transfer Training: Yes  Overall Level of Assistance: Assist X2;Minimum assistance  Interventions: Demonstration;Safety awareness training;Verbal cues (For hand placement, slow transitions, square to chair)  Sit to Stand: Assist X2;Minimum assistance  Stand to Sit: Assist X2;Minimum assistance  Stand Pivot Transfers: Assist X2;Minimum assistance  Gait Training  Gait Training: Yes  Gait  Overall Level of Assistance: Assist X2;Contact-guard assistance;Minimum assistance  Distance (ft): 50 Feet  Assistive Device: Gait belt;Walker, rolling  Interventions: Demonstration;Safety awareness training;Verbal cues (Cues to slow down, stay inside AD, keep both UE's on walker, increased step height and length, upright posture)  Base of Support: Narrowed  Speed/Raquel: Accelerated  Step Length: Left shortened;Right shortened  Gait Abnormalities: Decreased step clearance;Shuffling gait;Path deviations           Safety Devices  Type of Devices: All saravanan prominences offloaded;All fall risk precautions in place;Bed alarm in place;Call light within reach;Left in bed       Goals  Short Term Goals  Time Frame for Short Term Goals: 20 days  Short Term Goal 1: Patient to complete all transfers with CGAx1 and no LOB to decrease fall risk.  Short Term Goal 2: Patient to ambulate 20ftx2 with FWW and CGAx1 with no LOB or fatigue to improve mobility.  Short Term Goal 3: Pt to ascend/descend 13 steps with HRx1 and minAx1 with no LOB or fatigue to safely enter his apartment.  Short Term Goal 4: Patient to tolerate 20-30 min of ther ex/act to improve functional strength.    Education  Patient Education  Education Given To: Patient  Education Provided: Transfer Training;Fall Prevention Strategies  Education Method: Verbal;Demonstration;Teach Back  Barriers to

## 2023-12-30 LAB
ALBUMIN SERPL-MCNC: 2.9 G/DL (ref 3.5–5.2)
ALBUMIN/GLOB SERPL: 1.1 {RATIO} (ref 1–2.5)
ALP SERPL-CCNC: 111 U/L (ref 40–129)
ALT SERPL-CCNC: 26 U/L (ref 5–41)
ANION GAP SERPL CALCULATED.3IONS-SCNC: 7 MMOL/L (ref 9–17)
AST SERPL-CCNC: 38 U/L
BASOPHILS # BLD: 0.04 K/UL (ref 0–0.2)
BASOPHILS NFR BLD: 1 % (ref 0–2)
BILIRUB SERPL-MCNC: 0.4 MG/DL (ref 0.3–1.2)
BUN SERPL-MCNC: 35 MG/DL (ref 8–23)
BUN/CREAT SERPL: 16 (ref 9–20)
CALCIUM SERPL-MCNC: 8.5 MG/DL (ref 8.6–10.4)
CHLORIDE SERPL-SCNC: 120 MMOL/L (ref 98–107)
CK SERPL-CCNC: 272 U/L (ref 39–308)
CO2 SERPL-SCNC: 23 MMOL/L (ref 20–31)
CREAT SERPL-MCNC: 2.2 MG/DL (ref 0.7–1.2)
ECHO AO ROOT DIAM: 3.1 CM
ECHO AO ROOT INDEX: 1.66 CM/M2
ECHO AV ACCELERATION TIME: 46.04 MS
ECHO AV CUSP MM: 1.3 CM
ECHO AV MEAN GRADIENT: 5 MMHG
ECHO AV MEAN VELOCITY: 1.1 M/S
ECHO AV PEAK VELOCITY: 1.7 M/S
ECHO AV REGURGITANT PHT: 269.7 MILLISECOND
ECHO AV VTI: 29.8 CM
ECHO BSA: 1.89 M2
ECHO BSA: 1.89 M2
ECHO EST RA PRESSURE: 3 MMHG
ECHO LA DIAMETER INDEX: 2.78 CM/M2
ECHO LA DIAMETER: 5.2 CM
ECHO LA MAJOR AXIS: 5.8 CM
ECHO LA TO AORTIC ROOT RATIO: 1.68
ECHO LA VOL BP: 45 ML (ref 18–58)
ECHO LA VOL MOD A2C: 53 ML (ref 18–58)
ECHO LA VOL MOD A4C: 37 ML (ref 18–58)
ECHO LA VOL/BSA BIPLANE: 24 ML/M2 (ref 16–34)
ECHO LA VOLUME AREA LENGTH: 48 ML
ECHO LA VOLUME INDEX AREA LENGTH: 26 ML/M2 (ref 16–34)
ECHO LA VOLUME INDEX MOD A2C: 28 ML/M2 (ref 16–34)
ECHO LA VOLUME INDEX MOD A4C: 20 ML/M2 (ref 16–34)
ECHO LV E' LATERAL VELOCITY: 9 CM/S
ECHO LV EDV A2C: 52 ML
ECHO LV EDV A4C: 50 ML
ECHO LV EDV BP: 51 ML (ref 67–155)
ECHO LV EDV INDEX A4C: 27 ML/M2
ECHO LV EDV INDEX BP: 27 ML/M2
ECHO LV EDV NDEX A2C: 28 ML/M2
ECHO LV EJECTION FRACTION BIPLANE: 53 % (ref 55–100)
ECHO LV ESV A2C: 22 ML
ECHO LV ESV A4C: 24 ML
ECHO LV ESV BP: 24 ML (ref 22–58)
ECHO LV ESV INDEX A2C: 12 ML/M2
ECHO LV ESV INDEX A4C: 13 ML/M2
ECHO LV ESV INDEX BP: 13 ML/M2
ECHO LV FRACTIONAL SHORTENING: 40 % (ref 28–44)
ECHO LV INTERNAL DIMENSION DIASTOLE INDEX: 2.41 CM/M2
ECHO LV INTERNAL DIMENSION DIASTOLIC: 4.5 CM (ref 4.2–5.9)
ECHO LV INTERNAL DIMENSION SYSTOLIC INDEX: 1.44 CM/M2
ECHO LV INTERNAL DIMENSION SYSTOLIC: 2.7 CM
ECHO LV IVSD: 0.9 CM (ref 0.6–1)
ECHO LV IVSS: 1.4 CM
ECHO LV MASS 2D: 142.9 G (ref 88–224)
ECHO LV MASS INDEX 2D: 76.4 G/M2 (ref 49–115)
ECHO LV POSTERIOR WALL DIASTOLIC: 1 CM (ref 0.6–1)
ECHO LV POSTERIOR WALL SYSTOLIC: 1.4 CM
ECHO LV RELATIVE WALL THICKNESS RATIO: 0.44
ECHO LVOT AV VTI INDEX: 0.77
ECHO LVOT MEAN GRADIENT: 2 MMHG
ECHO LVOT VTI: 22.9 CM
ECHO MV A VELOCITY: 1.33 M/S
ECHO MV E DECELERATION TIME (DT): 423.6 MS
ECHO MV E VELOCITY: 0.68 M/S
ECHO MV E/A RATIO: 0.51
ECHO MV E/E' LATERAL: 7.56
ECHO PV MAX VELOCITY: 1.2 M/S
ECHO RIGHT VENTRICULAR SYSTOLIC PRESSURE (RVSP): 16 MMHG
ECHO TV REGURGITANT MAX VELOCITY: 1.78 M/S
EOSINOPHIL # BLD: 0.68 K/UL (ref 0–0.44)
EOSINOPHILS RELATIVE PERCENT: 10 % (ref 1–4)
ERYTHROCYTE [DISTWIDTH] IN BLOOD BY AUTOMATED COUNT: 14.2 % (ref 11.8–14.4)
GFR SERPL CREATININE-BSD FRML MDRD: 28 ML/MIN/1.73M2
GLUCOSE SERPL-MCNC: 97 MG/DL (ref 70–99)
HCT VFR BLD AUTO: 35.2 % (ref 40.7–50.3)
HGB BLD-MCNC: 11.4 G/DL (ref 13–17)
IMM GRANULOCYTES # BLD AUTO: 0.04 K/UL (ref 0–0.3)
IMM GRANULOCYTES NFR BLD: 1 %
LYMPHOCYTES NFR BLD: 1.36 K/UL (ref 1.1–3.7)
LYMPHOCYTES RELATIVE PERCENT: 19 % (ref 24–43)
MCH RBC QN AUTO: 33.9 PG (ref 25.2–33.5)
MCHC RBC AUTO-ENTMCNC: 32.4 G/DL (ref 28.4–34.8)
MCV RBC AUTO: 104.8 FL (ref 82.6–102.9)
MONOCYTES NFR BLD: 0.84 K/UL (ref 0.1–1.2)
MONOCYTES NFR BLD: 12 % (ref 3–12)
NEUTROPHILS NFR BLD: 57 % (ref 36–65)
NEUTS SEG NFR BLD: 4.21 K/UL (ref 1.5–8.1)
NRBC BLD-RTO: 0 PER 100 WBC
NUC STRESS EJECTION FRACTION: 70 %
PLATELET # BLD AUTO: 217 K/UL (ref 138–453)
PMV BLD AUTO: 10.7 FL (ref 8.1–13.5)
POTASSIUM SERPL-SCNC: 3.8 MMOL/L (ref 3.7–5.3)
PROT SERPL-MCNC: 5.6 G/DL (ref 6.4–8.3)
RBC # BLD AUTO: 3.36 M/UL (ref 4.21–5.77)
SODIUM SERPL-SCNC: 150 MMOL/L (ref 135–144)
STRESS BASELINE DIAS BP: 68 MMHG
STRESS BASELINE HR: 66 BPM
STRESS BASELINE SYS BP: 124 MMHG
STRESS PEAK DIAS BP: 68 MMHG
STRESS PEAK SYS BP: 132 MMHG
STRESS PERCENT HR ACHIEVED: 58 %
STRESS POST PEAK HR: 76 BPM
STRESS RATE PRESSURE PRODUCT: NORMAL BPM*MMHG
STRESS ST DEPRESSION: 0.8 MM
STRESS STAGE RECOVERY 1 BP: NORMAL MMHG
STRESS STAGE RECOVERY 1 DURATION: 0 MIN:SEC
STRESS STAGE RECOVERY 1 HR: 75 BPM
STRESS STAGE RECOVERY 2 DURATION: 1 MIN:SEC
STRESS STAGE RECOVERY 2 HR: 73 BPM
STRESS STAGE RECOVERY 3 BP: NORMAL MMHG
STRESS STAGE RECOVERY 3 DURATION: 3 MIN:SEC
STRESS STAGE RECOVERY 3 HR: 70 BPM
STRESS STAGE RECOVERY 4 BP: NORMAL MMHG
STRESS STAGE RECOVERY 4 DURATION: 5 MIN:SEC
STRESS STAGE RECOVERY 4 HR: 70 BPM
STRESS TARGET HR: 131 BPM
WBC OTHER # BLD: 7.2 K/UL (ref 3.5–11.3)

## 2023-12-30 PROCEDURE — 6360000002 HC RX W HCPCS: Performed by: STUDENT IN AN ORGANIZED HEALTH CARE EDUCATION/TRAINING PROGRAM

## 2023-12-30 PROCEDURE — 6370000000 HC RX 637 (ALT 250 FOR IP): Performed by: NURSE PRACTITIONER

## 2023-12-30 PROCEDURE — 80053 COMPREHEN METABOLIC PANEL: CPT

## 2023-12-30 PROCEDURE — 97110 THERAPEUTIC EXERCISES: CPT

## 2023-12-30 PROCEDURE — 6370000000 HC RX 637 (ALT 250 FOR IP): Performed by: STUDENT IN AN ORGANIZED HEALTH CARE EDUCATION/TRAINING PROGRAM

## 2023-12-30 PROCEDURE — 6370000000 HC RX 637 (ALT 250 FOR IP): Performed by: FAMILY MEDICINE

## 2023-12-30 PROCEDURE — 36415 COLL VENOUS BLD VENIPUNCTURE: CPT

## 2023-12-30 PROCEDURE — 2580000003 HC RX 258: Performed by: STUDENT IN AN ORGANIZED HEALTH CARE EDUCATION/TRAINING PROGRAM

## 2023-12-30 PROCEDURE — 94761 N-INVAS EAR/PLS OXIMETRY MLT: CPT

## 2023-12-30 PROCEDURE — 97530 THERAPEUTIC ACTIVITIES: CPT

## 2023-12-30 PROCEDURE — 92526 ORAL FUNCTION THERAPY: CPT

## 2023-12-30 PROCEDURE — 85025 COMPLETE CBC W/AUTO DIFF WBC: CPT

## 2023-12-30 PROCEDURE — 93018 CV STRESS TEST I&R ONLY: CPT | Performed by: FAMILY MEDICINE

## 2023-12-30 PROCEDURE — 93016 CV STRESS TEST SUPVJ ONLY: CPT | Performed by: FAMILY MEDICINE

## 2023-12-30 PROCEDURE — 82550 ASSAY OF CK (CPK): CPT

## 2023-12-30 PROCEDURE — 93306 TTE W/DOPPLER COMPLETE: CPT | Performed by: FAMILY MEDICINE

## 2023-12-30 PROCEDURE — 2580000003 HC RX 258: Performed by: NURSE PRACTITIONER

## 2023-12-30 PROCEDURE — 1200000000 HC SEMI PRIVATE

## 2023-12-30 PROCEDURE — 78452 HT MUSCLE IMAGE SPECT MULT: CPT | Performed by: FAMILY MEDICINE

## 2023-12-30 RX ADMIN — ASPIRIN 81 MG: 81 TABLET, COATED ORAL at 10:36

## 2023-12-30 RX ADMIN — DEXTROSE MONOHYDRATE: 50 INJECTION, SOLUTION INTRAVENOUS at 16:51

## 2023-12-30 RX ADMIN — TAMSULOSIN HYDROCHLORIDE 0.4 MG: 0.4 CAPSULE ORAL at 10:37

## 2023-12-30 RX ADMIN — OXYCODONE HYDROCHLORIDE AND ACETAMINOPHEN 1000 MG: 500 TABLET ORAL at 20:13

## 2023-12-30 RX ADMIN — MIDODRINE HYDROCHLORIDE 5 MG: 5 TABLET ORAL at 15:58

## 2023-12-30 RX ADMIN — MULTIVITAMIN TABLET 1 TABLET: TABLET at 10:37

## 2023-12-30 RX ADMIN — OXYCODONE HYDROCHLORIDE AND ACETAMINOPHEN 1000 MG: 500 TABLET ORAL at 10:37

## 2023-12-30 RX ADMIN — BUPROPION HYDROCHLORIDE 100 MG: 100 TABLET, FILM COATED, EXTENDED RELEASE ORAL at 10:37

## 2023-12-30 RX ADMIN — LEVOTHYROXINE SODIUM 88 MCG: 0.09 TABLET ORAL at 10:37

## 2023-12-30 RX ADMIN — POLYETHYLENE GLYCOL 3350 17 G: 17 POWDER, FOR SOLUTION ORAL at 10:36

## 2023-12-30 RX ADMIN — SODIUM CHLORIDE, PRESERVATIVE FREE 10 ML: 5 INJECTION INTRAVENOUS at 10:45

## 2023-12-30 RX ADMIN — TAMSULOSIN HYDROCHLORIDE 0.4 MG: 0.4 CAPSULE ORAL at 20:13

## 2023-12-30 RX ADMIN — FINASTERIDE 5 MG: 5 TABLET, FILM COATED ORAL at 10:37

## 2023-12-30 RX ADMIN — ENOXAPARIN SODIUM 30 MG: 100 INJECTION SUBCUTANEOUS at 10:36

## 2023-12-30 RX ADMIN — BUPROPION HYDROCHLORIDE 100 MG: 100 TABLET, FILM COATED, EXTENDED RELEASE ORAL at 20:13

## 2023-12-30 RX ADMIN — MIDODRINE HYDROCHLORIDE 5 MG: 5 TABLET ORAL at 10:43

## 2023-12-30 RX ADMIN — AMIODARONE HYDROCHLORIDE 100 MG: 200 TABLET ORAL at 10:37

## 2023-12-30 NOTE — PROGRESS NOTES
Physical Therapy  Facility/Department: [facility] AT THE [facility] MED SURG  Daily Treatment Note  NAME: Binh Velasquez  : 1934  MRN: [id number]    Date of Service: 2023    Discharge Recommendations:  Continue to assess pending progress, IP Rehab, Subacute/Skilled Nursing Facility        Patient Diagnosis(es): The primary encounter diagnosis was Traumatic rhabdomyolysis, initial encounter ([address] Diagnoses of Multiple contusions, Fall, initial encounter, NSVT (nonsustained ventricular tachycardia) ([address]), ASHD (arteriosclerotic heart disease), Recurrent syncope, and Essential hypertension were also pertinent to this visit. Assessment   Assessment: Transfers: CGAx1. Bed Mobs: Min x1. Ther-ex: Supine BL Ex15 in all planes. Pt declined amb at this time. Activity Tolerance: Patient limited by fatigue;Patient tolerated treatment well     Plan    Physical Therapy Plan  General Plan: 2 times a day 7 days a week (1x/day on weekends and holidays)  Current Treatment Recommendations: Strengthening;ROM;Balance training;Transfer training;Neuromuscular re-education;Stair training;Functional mobility training;Gait training;Home exercise program;Therapeutic activities; Safety education & training;Patient/Caregiver education & training;Manual;Endurance training     Restrictions  Restrictions/Precautions  Restrictions/Precautions: General Precautions, Fall Risk     Subjective    Subjective  Subjective: Pt in bed upon arrival, agreeable to therapy, states he is feeling good at this time  Pain: denies pain complaint  Orientation  Overall Orientation Status: Impaired     Objective   Vitals     Bed Mobility Training  Bed Mobility Training: Yes  Overall Level of Assistance: Minimum assistance;Assist X1  Interventions: Tactile cues; Verbal cues  Rolling: Minimum assistance;Assist X1  Supine to Sit: Assist X1;Minimum assistance  Sit to Supine: Assist X2;Minimum assistance  Scooting: Minimum assistance;Assist X1  Balance  Sitting:

## 2023-12-30 NOTE — PROGRESS NOTES
Occupational Therapy  Facility/Department: [facility] AT THE [facility] MED SURG  Daily Treatment Note  NAME: Deedee Rivera  : 1934  MRN: [id number]    Date of Service: 2023    Discharge Recommendations:  Continue to assess pending progress         Patient Diagnosis(es): The primary encounter diagnosis was Traumatic rhabdomyolysis, initial encounter ([address] Diagnoses of Multiple contusions, Fall, initial encounter, NSVT (nonsustained ventricular tachycardia) ([address]), ASHD (arteriosclerotic heart disease), Recurrent syncope, and Essential hypertension were also pertinent to this visit. Assessment    Assessment: Pt frequently falling asleep throughout therapy session. Activity Tolerance: Patient limited by fatigue;Patient limited by endurance  Discharge Recommendations: Continue to assess pending progress      Plan   Occupational Therapy Plan  Times Per Day: Once a day  Current Treatment Recommendations: Strengthening;Balance training;Functional mobility training; Endurance training;ROM;Safety education & training;Patient/Caregiver education & training;Equipment evaluation, education, & procurement;Self-Care / ADL     Restrictions  Restrictions/Precautions  Restrictions/Precautions: General Precautions; Fall Risk    Subjective   Subjective  Subjective: Pt lying in bed upon arrival. Pt agreed to participate in therapy session. Pain: Pt denied pain this date. Orientation  Overall Orientation Status: Impaired  Pain: denies pain complaint           Objective    Vitals          ADL  Skin Care: Bath wipes  OT Exercises  Exercise Treatment: Pt completed BUE ther ex x 5 planes x 10 reps x 1 set to increase UE strength and endurance in order to ease completion of ADL tasks. Pt required RBs as needed secondary to fatigue. Safety Devices  Type of Devices: All fall risk precautions in place;Call light within reach; Bed alarm in place; Patient at risk for falls; Left in bed;Nurse notified     Patient Education  Education Given To:

## 2023-12-30 NOTE — PROGRESS NOTES
[facility]    Facility/Department: [facility] MED SURG    Speech Language Pathology    Clinical Bedside Swallow Evaluation    NAME:Jonathan Iglesias    : 1934 (80 y.o.)    MRN: [id number]    ROOM: [id number]    ADMISSION DATE: 2023    PATIENT DIAGNOSIS(ES): Rhabdomyolysis [M62.82]  Multiple contusions [T07. XXXA]  Fall, initial encounter [id number]. XXXA]  Traumatic rhabdomyolysis, initial encounter ([address]) Tisha Bad. 6XXA]    Chief Complaint   Patient presents with    Fall     Pt reports lost his balance yesterday and fell hitting head. Laceration noted to forehead. Denies loc and no blood thinners. Today complaint of mid back pain and pain to neck when twisting it. Family reports lives in a second story apartment and wanted him evaluated before leaving him alone.         Patient Active Problem List    Diagnosis Date Noted    Incomplete bladder emptying 2022    CKD (chronic kidney disease) stage 4, GFR 15-29 ml/min ([address]) 2022    Chronic renal disease, stage III ([address]) [856441] 2022    Orthostatic hypotension 2023    Syncope 2023    Generalized weakness 2023    Rhabdomyolysis 2023    LVH (left ventricular hypertrophy) 10/04/2017    Detrusor instability of bladder 2017    Hyperglycemia 2017    Hyperlipidemia 2017    Chronic renal failure, stage 3 (moderate) ([address]) 2017    Avulsion fracture 2016    Essential hypertension 2016    Localized osteoarthrosis, lower leg 10/14/2015    ASHD (arteriosclerotic heart disease) 10/14/2015    Insomnia 10/14/2015    Anxiety 10/14/2015    BPH with obstruction/lower urinary tract symptoms 10/05/2015    Hypothyroidism 2015    Bipolar 1 disorder ([address]) 2015       Past Medical History:   Diagnosis Date    Acute renal insufficiency 10/5/2015    Anxiety 10/14/2015    ASHD (arteriosclerotic heart disease) 10/14/2015    Bipolar disorder ([address]) 1976    Essential hypertension 2016    Hyperlipidemia

## 2023-12-30 NOTE — PROGRESS NOTES
Patient: Jonathan Iglesias  : 1934  Date of Admission: 2023  Primary Care Physician: Marty Lopez  Today's Date: 2023    REASON FOR CONSULTATION: Fall (Pt reports lost his balance yesterday and fell hitting head. Laceration noted to forehead. Denies loc and no blood thinners. Today complaint of mid back pain and pain to neck when twisting it. Family reports lives in a second story apartment and wanted him evaluated before leaving him alone. )    HPI: Mr. Iglesias is a 89 y.o. male who was admitted to the hospital with a fall leading to this consultation.     Mr. Iglesias denies any problems today. He appeared tired this afternoon but denied any current or recent chest pain, shortness of breath, abdominal pain, bleeding problems, problems with his medications or any other concerns at this time.     Past Medical History:   Diagnosis Date    Acute renal insufficiency 10/5/2015    Anxiety 10/14/2015    ASHD (arteriosclerotic heart disease) 10/14/2015    Bipolar disorder (HCC) 1976    Essential hypertension 2016    Hyperlipidemia     Hypothyroidism     Localized osteoarthrosis, lower leg 10/14/2015    Parkinson's disease     light, 2010       CURRENT ALLERGIES: Patient has no known allergies. REVIEW OF SYSTEMS: 14 systems were reviewed. Pertinent positives and negatives as above, all else negative.     Past Surgical History:   Procedure Laterality Date    CORONARY ARTERY BYPASS GRAFT  08/21/2015     X3 W/ LIMA TO LAD    DIAGNOSTIC CARDIAC CATH LAB PROCEDURE  08/17/15    OTHER SURGICAL HISTORY  2015    endovascular vein harvesting    OTHER SURGICAL HISTORY  2015     normothermic cardiopulmonary bypass w/ cardioplegic arrest of the heart    OTHER SURGICAL HISTORY      gallstones removed, Lap    Social History:  Social History     Tobacco Use    Smoking status: Former     Current packs/day: 0.00     Types: Cigarettes     Quit date: 1978     Years since quittin.0     normal.   Skin: Skin is warm and dry. There is no rash or diaphoresis. Psychiatric: He has a normal mood and affect. His speech is normal and behavior is normal.      MOST RECENT LABS ON RECORD:   Lab Results   Component Value Date    WBC 8.6 12/29/2023    HGB 12.4 (L) 12/29/2023    HCT 38.0 (L) 12/29/2023     12/29/2023    CHOL 108 02/28/2023    TRIG 82 02/28/2023    HDL 33 (L) 02/28/2023    ALT 36 12/29/2023    AST 62 (H) 12/29/2023     (H) 12/29/2023    K 3.8 12/29/2023     (H) 12/29/2023    CREATININE 2.3 (H) 12/29/2023    BUN 38 (H) 12/29/2023    CO2 25 12/29/2023    TSH 0.60 12/14/2023    PSA 1.45 11/21/2013    INR 1.1 12/27/2023    LABA1C 5.7 02/28/2023     ASSESSMENT:  Patient Active Problem List    Diagnosis Date Noted    Incomplete bladder emptying 12/19/2022    CKD (chronic kidney disease) stage 4, GFR 15-29 ml/min ([facility]) 11/21/2022    Chronic renal disease, stage III ([address]) [604094] 05/24/2022    Orthostatic hypotension 12/29/2023    Syncope 12/29/2023    Generalized weakness 12/28/2023    Rhabdomyolysis 12/27/2023    LVH (left ventricular hypertrophy) 10/04/2017    Detrusor instability of bladder 07/05/2017    Hyperglycemia 01/03/2017    Hyperlipidemia 01/03/2017    Chronic renal failure, stage 3 (moderate) ([address]) 01/03/2017    Avulsion fracture 09/23/2016    Essential hypertension 09/23/2016    Localized osteoarthrosis, lower leg 10/14/2015    ASHD (arteriosclerotic heart disease) 10/14/2015    Insomnia 10/14/2015    Anxiety 10/14/2015    BPH with obstruction/lower urinary tract symptoms 10/05/2015    Hypothyroidism 03/25/2015    Bipolar 1 disorder ([address]) 03/25/2015      PLAN:  Syncope/Near Syncope with recent history of non sustained ventricular tachycardia as well as CABG 9 years ago. Says he has not had a stress test in at least 5 years. Pharmacological Management: Mildly orthostatic now so agree with IVF now. I also think that midodrine is a reasonable idea.    Nonpharmacologic

## 2023-12-31 VITALS
SYSTOLIC BLOOD PRESSURE: 103 MMHG | RESPIRATION RATE: 18 BRPM | HEIGHT: 67 IN | WEIGHT: 165.4 LBS | TEMPERATURE: 97.4 F | OXYGEN SATURATION: 92 % | DIASTOLIC BLOOD PRESSURE: 57 MMHG | HEART RATE: 71 BPM | BODY MASS INDEX: 25.96 KG/M2

## 2023-12-31 LAB
ALBUMIN SERPL-MCNC: 2.9 G/DL (ref 3.5–5.2)
ALBUMIN/GLOB SERPL: 1 {RATIO} (ref 1–2.5)
ALP SERPL-CCNC: 115 U/L (ref 40–129)
ALT SERPL-CCNC: 38 U/L (ref 5–41)
ANION GAP SERPL CALCULATED.3IONS-SCNC: 7 MMOL/L (ref 9–17)
AST SERPL-CCNC: 44 U/L
BASOPHILS # BLD: 0.04 K/UL (ref 0–0.2)
BASOPHILS NFR BLD: 0 % (ref 0–2)
BILIRUB SERPL-MCNC: 0.4 MG/DL (ref 0.3–1.2)
BUN SERPL-MCNC: 30 MG/DL (ref 8–23)
BUN/CREAT SERPL: 14 (ref 9–20)
CALCIUM SERPL-MCNC: 8.6 MG/DL (ref 8.6–10.4)
CHLORIDE SERPL-SCNC: 118 MMOL/L (ref 98–107)
CK SERPL-CCNC: 157 U/L (ref 39–308)
CO2 SERPL-SCNC: 24 MMOL/L (ref 20–31)
CREAT SERPL-MCNC: 2.1 MG/DL (ref 0.7–1.2)
EOSINOPHIL # BLD: 0.69 K/UL (ref 0–0.44)
EOSINOPHILS RELATIVE PERCENT: 7 % (ref 1–4)
ERYTHROCYTE [DISTWIDTH] IN BLOOD BY AUTOMATED COUNT: 14.2 % (ref 11.8–14.4)
GFR SERPL CREATININE-BSD FRML MDRD: 30 ML/MIN/1.73M2
GLUCOSE SERPL-MCNC: 96 MG/DL (ref 70–99)
HCT VFR BLD AUTO: 37.3 % (ref 40.7–50.3)
HGB BLD-MCNC: 12 G/DL (ref 13–17)
IMM GRANULOCYTES # BLD AUTO: 0.05 K/UL (ref 0–0.3)
IMM GRANULOCYTES NFR BLD: 1 %
LYMPHOCYTES NFR BLD: 1.22 K/UL (ref 1.1–3.7)
LYMPHOCYTES RELATIVE PERCENT: 13 % (ref 24–43)
MCH RBC QN AUTO: 33.4 PG (ref 25.2–33.5)
MCHC RBC AUTO-ENTMCNC: 32.2 G/DL (ref 28.4–34.8)
MCV RBC AUTO: 103.9 FL (ref 82.6–102.9)
MONOCYTES NFR BLD: 0.89 K/UL (ref 0.1–1.2)
MONOCYTES NFR BLD: 9 % (ref 3–12)
NEUTROPHILS NFR BLD: 70 % (ref 36–65)
NEUTS SEG NFR BLD: 6.74 K/UL (ref 1.5–8.1)
NRBC BLD-RTO: 0 PER 100 WBC
PLATELET # BLD AUTO: 227 K/UL (ref 138–453)
PMV BLD AUTO: 10.6 FL (ref 8.1–13.5)
POTASSIUM SERPL-SCNC: 4.1 MMOL/L (ref 3.7–5.3)
PROT SERPL-MCNC: 5.8 G/DL (ref 6.4–8.3)
RBC # BLD AUTO: 3.59 M/UL (ref 4.21–5.77)
SODIUM SERPL-SCNC: 149 MMOL/L (ref 135–144)
WBC OTHER # BLD: 9.6 K/UL (ref 3.5–11.3)

## 2023-12-31 PROCEDURE — 2580000003 HC RX 258: Performed by: NURSE PRACTITIONER

## 2023-12-31 PROCEDURE — 6370000000 HC RX 637 (ALT 250 FOR IP): Performed by: STUDENT IN AN ORGANIZED HEALTH CARE EDUCATION/TRAINING PROGRAM

## 2023-12-31 PROCEDURE — 92526 ORAL FUNCTION THERAPY: CPT

## 2023-12-31 PROCEDURE — 80053 COMPREHEN METABOLIC PANEL: CPT

## 2023-12-31 PROCEDURE — 97110 THERAPEUTIC EXERCISES: CPT

## 2023-12-31 PROCEDURE — 6360000002 HC RX W HCPCS: Performed by: STUDENT IN AN ORGANIZED HEALTH CARE EDUCATION/TRAINING PROGRAM

## 2023-12-31 PROCEDURE — 97530 THERAPEUTIC ACTIVITIES: CPT

## 2023-12-31 PROCEDURE — 85025 COMPLETE CBC W/AUTO DIFF WBC: CPT

## 2023-12-31 PROCEDURE — 6370000000 HC RX 637 (ALT 250 FOR IP): Performed by: NURSE PRACTITIONER

## 2023-12-31 PROCEDURE — 6370000000 HC RX 637 (ALT 250 FOR IP): Performed by: FAMILY MEDICINE

## 2023-12-31 PROCEDURE — 82550 ASSAY OF CK (CPK): CPT

## 2023-12-31 PROCEDURE — 36415 COLL VENOUS BLD VENIPUNCTURE: CPT

## 2023-12-31 RX ORDER — MIDODRINE HYDROCHLORIDE 5 MG/1
5 TABLET ORAL 2 TIMES DAILY WITH MEALS
Qty: 90 TABLET | Refills: 3 | Status: ON HOLD | DISCHARGE
Start: 2023-12-31

## 2023-12-31 RX ADMIN — FINASTERIDE 5 MG: 5 TABLET, FILM COATED ORAL at 11:11

## 2023-12-31 RX ADMIN — AMIODARONE HYDROCHLORIDE 100 MG: 200 TABLET ORAL at 11:11

## 2023-12-31 RX ADMIN — ASPIRIN 81 MG: 81 TABLET, COATED ORAL at 11:11

## 2023-12-31 RX ADMIN — MIDODRINE HYDROCHLORIDE 5 MG: 5 TABLET ORAL at 11:11

## 2023-12-31 RX ADMIN — TAMSULOSIN HYDROCHLORIDE 0.4 MG: 0.4 CAPSULE ORAL at 11:11

## 2023-12-31 RX ADMIN — MULTIVITAMIN TABLET 1 TABLET: TABLET at 11:11

## 2023-12-31 RX ADMIN — OXYCODONE HYDROCHLORIDE AND ACETAMINOPHEN 1000 MG: 500 TABLET ORAL at 11:11

## 2023-12-31 RX ADMIN — BUPROPION HYDROCHLORIDE 100 MG: 100 TABLET, FILM COATED, EXTENDED RELEASE ORAL at 11:11

## 2023-12-31 RX ADMIN — LEVOTHYROXINE SODIUM 88 MCG: 0.09 TABLET ORAL at 11:11

## 2023-12-31 RX ADMIN — ENOXAPARIN SODIUM 30 MG: 100 INJECTION SUBCUTANEOUS at 11:11

## 2023-12-31 RX ADMIN — POLYETHYLENE GLYCOL 3350 17 G: 17 POWDER, FOR SOLUTION ORAL at 11:11

## 2023-12-31 RX ADMIN — DEXTROSE MONOHYDRATE: 50 INJECTION, SOLUTION INTRAVENOUS at 07:17

## 2023-12-31 ASSESSMENT — PAIN SCALES - GENERAL: PAINLEVEL_OUTOF10: 0

## 2023-12-31 NOTE — PROGRESS NOTES
Patient discharged, via wheelchair transport, to transport van at this time. All belongings in hand. Pt packet with AVS, MAR, last vitals and DC summary in hand and [facility] notified.

## 2023-12-31 NOTE — PROGRESS NOTES
Garrison, RN supervisor, notified Dr. Bose, via telephone, of official pathology reading indicating few intracellular calcium pyrophosphate crystals. MD responded, \"Ok, thanks.\"

## 2023-12-31 NOTE — DISCHARGE SUMMARY
Discharge Summary    Jonathan Iglesias  :  1934  MRN:  [id number]    Admit date:  2023      Discharge date:       Admitting Physician:  Jony Dunn MD    Discharge Diagnoses:      Principal Problem:    Rhabdomyolysis  Active Problems:    CKD (chronic kidney disease) stage 4, GFR 15-29 ml/min (HCC)    Essential hypertension    Generalized weakness    Orthostatic hypotension    Syncope  Resolved Problems:    * No resolved hospital problems. *      Active Hospital Problems    Diagnosis Date Noted    CKD (chronic kidney disease) stage 4, GFR 15-29 ml/min (HCC) [N18.4] 2022     Priority: Medium    Orthostatic hypotension [I95.1] 2023    Syncope [R55] 2023    Generalized weakness [R53.1] 2023    Rhabdomyolysis [M62.82] 2023    Essential hypertension [I10] 2016       Discharge Medications:         Medication List        START taking these medications      midodrine 5 MG tablet  Commonly known as: PROAMATINE  Take 1 tablet by mouth 2 times daily (with meals)            CONTINUE taking these medications      amiodarone 200 MG tablet  Commonly known as: CORDARONE     aspirin 81 MG tablet     atorvastatin 20 MG tablet  Commonly known as: LIPITOR     buPROPion 100 MG extended release tablet  Commonly known as: Wellbutrin SR  Take 1 tablet by mouth 2 times daily     finasteride 5 MG tablet  Commonly known as: PROSCAR  Take 1 tablet by mouth daily     fish oil 1000 MG capsule     levothyroxine 88 MCG tablet  Commonly known as: SYNTHROID  Take 1 tablet by mouth daily     MULTI VITAMIN DAILY PO     polyethylene glycol 17 GM/SCOOP powder  Commonly known as: GLYCOLAX     tamsulosin 0.4 MG capsule  Commonly known as: FLOMAX  Take 1 capsule by mouth daily     vitamin C 500 MG tablet  Commonly known as: ASCORBIC ACID            STOP taking these medications      fludrocortisone 0.1 MG tablet  Commonly known as: FLORINEF               Where to Get Your Medications        Information about

## 2023-12-31 NOTE — PROGRESS NOTES
Writer spoke with son regarding Dr. Albright response and opinion, pt condition, and comfort level of pt to [facility]. Son agrees to transfer today. Will prep patient for transport now.

## 2023-12-31 NOTE — PROGRESS NOTES
Pt son spoke with Dr. Albright at this time regarding concerns of [facility]. MD to call [facility] to assure of safe staffing. Awaiting response. Transport waiting for answer. Will continue to monitor.

## 2023-12-31 NOTE — PROGRESS NOTES
[facility] Will notify son.

## 2023-12-31 NOTE — PROGRESS NOTES
Spiritual Services Interventions  0332/0332-01   12/31/2023  Marquita Iglesias  89 y.o. year old male    Encounter Summary  Encounter Overview/Reason : (P) Initial Encounter  Service Provided For:: (P) Patient and family together  Referral/Consult From:: (P) Rounding  Support System: (P) Children, Family members  Last Encounter : (P) 12/31/23  Complexity of Encounter: (P) Moderate  Begin Time: (P) 1100  End Time : (P) 1115  Total Time Calculated: (P) 15 min                          Assessment/Intervention/Outcome  Assessment: (P) Calm  Intervention: (P) Active listening, Explored/Affirmed feelings, thoughts, concerns, Other (Comment) (hospitality)  Outcome: (P) Expressed feelings, needs, and concerns, Expressed Gratitude

## 2023-12-31 NOTE — PROGRESS NOTES
[facility]    Facility/Department: [facility] MED SURG    Speech Language Pathology    Clinical Bedside Swallow Evaluation    NAME:Jonahtan Iglesias    : 1934 (89 y.o.)    MRN: [id number]    ROOM:     ADMISSION DATE: 2023    PATIENT DIAGNOSIS(ES): Rhabdomyolysis [M62.82]  Multiple contusions [T07.XXXA]  Fall, initial encounter [W19.XXXA]  Traumatic rhabdomyolysis, initial encounter ([facility]) [T79.6XXA]    Chief Complaint   Patient presents with    Fall     Pt reports lost his balance yesterday and fell hitting head. Laceration noted to forehead. Denies loc and no blood thinners. Today complaint of mid back pain and pain to neck when twisting it. Family reports lives in a second story apartment and wanted him evaluated before leaving him alone.        Patient Active Problem List    Diagnosis Date Noted    Incomplete bladder emptying 2022    CKD (chronic kidney disease) stage 4, GFR 15-29 ml/min ([facility]) 2022    Chronic renal disease, stage III ([facility]) [356698] 2022    Orthostatic hypotension 2023    Syncope 2023    Generalized weakness 2023    Rhabdomyolysis 2023    LVH (left ventricular hypertrophy) 10/04/2017    Detrusor instability of bladder 2017    Hyperglycemia 2017    Hyperlipidemia 2017    Chronic renal failure, stage 3 (moderate) ([facility]) 2017    Avulsion fracture 2016    Essential hypertension 2016    Localized osteoarthrosis, lower leg 10/14/2015    ASHD (arteriosclerotic heart disease) 10/14/2015    Insomnia 10/14/2015    Anxiety 10/14/2015    BPH with obstruction/lower urinary tract symptoms 10/05/2015    Hypothyroidism 2015    Bipolar 1 disorder ([facility]) 2015       Past Medical History:   Diagnosis Date    Acute renal insufficiency 10/5/2015    Anxiety 10/14/2015    ASHD (arteriosclerotic heart disease) 10/14/2015    Bipolar disorder ([facility]) 1976    Essential hypertension 2016    Hyperlipidemia   dysphagia.    Patient Complaint       General    Chart Reviewed: Yes  Behavior/Cognition: Cooperative;Pleasant mood;Alert  Temperature Spikes Noted: Yes  Respiratory Status: Room air  O2 Device: None (Room air)  Communication Observation: Functional  Follows Directions: Simple  Dentition: Dentures top;Dentures bottom  Patient Positioning: Upright in chair  Baseline Vocal Quality: Wet;Weak  Volitional Cough: Strong  Prior Dysphagia History: No history of dysphagia.  Consistencies Administered: Mildly Thick - straw;Mildly Thick - cup;Pureed;Soft and Bite-Sized    Vision and Hearing    Vision  Vision: Within Functional Limits  Hearing  Hearing: Within functional limits    Current Diet level    Current Diet : Easy to chew    Oral Motor    Labial: No impairment  Dentition: Upper dentures;Lower dentures  Oral Hygiene: Clean  Lingual: No impairment  Velum: No Impairment    Oral/Pharyngeal Phase    Oral Phase - Comment: Patient completed trials of soft and bite size and mildly thickened liquids presented via cup and straw. Patient demonstrated coughing only once, which was delayed. SLP recommends patient to stay on diet of easy to chew and mildly thick liquids. SLP also recommends patient drink via cup only and avoid straws. Patient requires mod-max verbal and visual cueing during meal times to take small bites/sips and eat/drink at a safe pace as well as assist feed.    PO Trials  Assessment Method(s): Observation;Palpation  Patient Position: seated upright in chair  Vocal Quality: Wet  Consistency Presented: Pureed;Mildly Thick;Soft & Bite Sized  How Presented: SLP-fed/Presented;Self-fed/presented  Type of Impairment: Delayed  Propulsion: Discoordination  Oral Residue: Less than 10% of bolus  Pharyngeal Phase Characteristics: Suspected pharyngeal residue;Altered vocal quality  Effective Modifications: Alternate liquids/solids;Small sips and bites  Comments: SLP recommends assist feed, though patient is performing more

## 2023-12-31 NOTE — PROGRESS NOTES
Occupational Therapy  Facility/Department: [facility] MED SURG  Daily Treatment Note  NAME: Jonathan Iglesias  : 1934  MRN: [id number]    Date of Service: 2023    Discharge Recommendations:  Continue to assess pending progress         Patient Diagnosis(es): The primary encounter diagnosis was Traumatic rhabdomyolysis, initial encounter ([facility]). Diagnoses of Multiple contusions, Fall, initial encounter, NSVT (nonsustained ventricular tachycardia) ([facility]), ASHD (arteriosclerotic heart disease), Recurrent syncope, and Essential hypertension were also pertinent to this visit.     Assessment    Activity Tolerance: Patient limited by fatigue;Patient limited by endurance  Discharge Recommendations: Continue to assess pending progress      Plan   Occupational Therapy Plan  Times Per Day: Once a day  Days Per Week: 7 Days  Current Treatment Recommendations: Strengthening;Balance training;Functional mobility training;Endurance training;ROM;Safety education & training;Patient/Caregiver education & training;Equipment evaluation, education, & procurement;Self-Care / ADL     Restrictions  Restrictions/Precautions  Restrictions/Precautions: General Precautions;Fall Risk    Subjective   Subjective  Subjective: Pt sitting up in bedside chair upon arrival. Pt agreed to participate in therapy session.  Pain: Pt denied pain this date.  Orientation  Overall Orientation Status: Impaired  Orientation Level: Unable to assess  Pain: denies pain complaint           Objective    Vitals             OT Exercises  Exercise Treatment: Pt completed BUE ther ex with 1# dumbbell x 7 planes x 10 reps x 1 set to increase UE strength and endurance in order to ease completion of ADL tasks. Pt required RBs as needed secondary to fatigue.     Safety Devices  Type of Devices: All fall risk precautions in place;Call light within reach;Bed alarm in place;Patient at risk for falls;Nurse notified;Left in chair;Chair alarm in place     Patient

## 2023-12-31 NOTE — PROGRESS NOTES
Physical Therapy  Facility/Department: [facility] MED SURG  Daily Treatment Note  NAME: Jonathan Iglesias  : 1934  MRN: [id number]    Date of Service: 2023    Discharge Recommendations:  Continue to assess pending progress,  Rehab, Subacute/Skilled Nursing Facility        Patient Diagnosis(es): The primary encounter diagnosis was Traumatic rhabdomyolysis, initial encounter ([facility]). Diagnoses of Multiple contusions, Fall, initial encounter, NSVT (nonsustained ventricular tachycardia) ([facility]), ASHD (arteriosclerotic heart disease), Recurrent syncope, and Essential hypertension were also pertinent to this visit.    Assessment   Assessment: Transfers: CGAx1. Bed Mobs: Min x1. Ther-ex: Supine BL Ex15 in all planes. Pt declined amb at this time, when asked he stated \"hello no im not walking.\"  Activity Tolerance: Patient limited by fatigue;Patient limited by endurance     Plan    Physical Therapy Plan  General Plan: 2 times a day 7 days a week (1x/day on weekends and holidays)  Current Treatment Recommendations: Strengthening;ROM;Balance training;Transfer training;Neuromuscular re-education;Stair training;Functional mobility training;Gait training;Home exercise program;Therapeutic activities;Safety education & training;Patient/Caregiver education & training;Manual;Endurance training     Restrictions  Restrictions/Precautions  Restrictions/Precautions: General Precautions, Fall Risk     Subjective    Subjective  Subjective: pt in bed upon arrival, agreeable to therapy  Pain: denies pain complaint  Orientation  Overall Orientation Status: Impaired  Orientation Level: Unable to assess     Objective     Bed Mobility Training  Bed Mobility Training: Yes  Overall Level of Assistance: Minimum assistance;Assist X1  Interventions: Tactile cues;Verbal cues  Rolling: Minimum assistance;Assist X1  Supine to Sit: Assist X1;Minimum assistance  Sit to Supine: Assist X2;Minimum assistance  Scooting: Minimum assistance;Assist  X1  Balance  Sitting: Impaired  Sitting - Static: Fair (occasional)  Sitting - Dynamic: Fair (occasional)  Standing: Impaired  Standing - Static: Fair  Standing - Dynamic: Fair;Poor  Transfer Training  Transfer Training: Yes  Overall Level of Assistance: Contact-guard assistance;Assist X1  Interventions: Tactile cues;Verbal cues;Safety awareness training  Sit to Stand: Assist X1;Contact-guard assistance  Stand to Sit: Contact-guard assistance;Assist X1  Stand Pivot Transfers: Contact-guard assistance;Assist X1  Bed to Chair: Contact-guard assistance;Assist X1  Gait Training  Gait Training: No     PT Exercises  Exercise Treatment: Supine BLE x15 in all planes     Safety Devices  Type of Devices: All fall risk precautions in place;Call light within reach;Bed alarm in place;Patient at risk for falls;Nurse notified;Left in chair;Chair alarm in place       Goals  Short Term Goals  Time Frame for Short Term Goals: 20 days  Short Term Goal 1: Patient to complete all transfers with CGAx1 and no LOB to decrease fall risk.  Short Term Goal 2: Patient to ambulate 20ftx2 with FWW and CGAx1 with no LOB or fatigue to improve mobility.  Short Term Goal 3: Pt to ascend/descend 13 steps with HRx1 and minAx1 with no LOB or fatigue to safely enter his apartment.  Short Term Goal 4: Patient to tolerate 20-30 min of ther ex/act to improve functional strength.    Education  Patient Education  Education Given To: Patient  Education Provided: Transfer Training;Fall Prevention Strategies  Education Method: Verbal;Demonstration;Teach Back  Barriers to Learning: Cognition;None  Education Outcome: Verbalized understanding;Continued education needed;Demonstrated understanding        Therapy Time   Individual Concurrent Group Co-treatment   Time In 0828         Time Out 0840         Minutes 12             Jessica Diego PTA

## 2023-12-31 NOTE — PROGRESS NOTES
[facility] called at this time to set up ambulette transport. Dispatch states \"I don't have anyone in the area today so I'll have to call it out, I'll see what I can do.\" Papers faxed to [employer]. Will continue to monitor.

## 2023-12-31 NOTE — PROGRESS NOTES
Pt son, Myles, informed RN that [facility] today was not the plan he understand. Son states \"Dr. Lopez said he wouldn't go until Tuesday and he has been in contact with Dr. Albright.\" RN attempting to contact Dr. Albright multiple times at this time, via telephone call and message. Awaiting response. Will continue to monitor.

## 2023-12-31 NOTE — ED PROVIDER NOTES
URINALYSIS WITH REFLEX TO CULTURE - Abnormal; Notable for the following components:    Specific Gravity, UA <1.005 (*)     All other components within normal limits   TROPONIN - Abnormal; Notable for the following components:    Troponin, High Sensitivity 76 (*)     All other components within normal limits   CK - Abnormal; Notable for the following components:    Total  (*)     All other components within normal limits   MYOGLOBIN, BLOOD - Abnormal; Notable for the following components:    Myoglobin 1,517 (*)     All other components within normal limits   TROPONIN - Abnormal; Notable for the following components:    Troponin, High Sensitivity 74 (*)     All other components within normal limits   CBC WITH AUTO DIFFERENTIAL - Abnormal; Notable for the following components:    RBC 3.62 (*)     Hemoglobin 12.3 (*)     Hematocrit 37.5 (*)     .6 (*)     MCH 34.0 (*)     Lymphocytes % 18 (*)     Eosinophils % 5 (*)     Immature Granulocytes 1 (*)     All other components within normal limits   COMPREHENSIVE METABOLIC PANEL W/ REFLEX TO MG FOR LOW K - Abnormal; Notable for the following components:    Sodium 146 (*)     Chloride 112 (*)     BUN 45 (*)     Creatinine 2.2 (*)     Est, Glom Filt Rate 28 (*)     Total Protein 5.9 (*)     Albumin 2.9 (*)     AST 68 (*)     All other components within normal limits   CK - Abnormal; Notable for the following components:    Total  (*)     All other components within normal limits   TROPONIN - Abnormal; Notable for the following components:    Troponin, High Sensitivity 88 (*)     All other components within normal limits   CBC WITH AUTO DIFFERENTIAL - Abnormal; Notable for the following components:    RBC 3.66 (*)     Hemoglobin 12.4 (*)     Hematocrit 38.0 (*)     .8 (*)     MCH 33.9 (*)     Neutrophils % 67 (*)     Lymphocytes % 16 (*)     Eosinophils % 6 (*)     Eosinophils Absolute 0.50 (*)     All other components within normal limits  encounter    4. NSVT (nonsustained ventricular tachycardia) ([facility])    5. ASHD (arteriosclerotic heart disease)    6. Recurrent syncope    7. Essential hypertension          DISPOSITION/PLAN   DISPOSITION Admitted 12/27/2023 03:56:25 PM      PATIENT [facility]:  [facility]  [address] [place] [address]  [phone]          DISCHARGE MEDICATIONS:  Current Discharge Medication List        Controlled Substances Monitoring:          No data to display                (Please note that portions of this note were completed with a voice recognition program.  Efforts were made to edit the dictations but occasionally words are mis-transcribed.)    Nathalia Valdes MD (electronically signed)  Attending Emergency Physician           Nathalia Valdes MD  12/31/23 [id number]

## 2023-12-31 NOTE — PROGRESS NOTES
RN attempting to contact Dr. Albright multiple times at this time, via telephone call and message. Multiple messages left with no answer. Awaiting response. Will continue to monitor.

## 2023-12-31 NOTE — PROGRESS NOTES
[facility] Pt information given to ARIANNA Sloan. All questions answered, understanding verbalized. Estimated pickup time for pt [phone], will notify RN if changes. Callback number given pending further questions.

## 2024-01-01 ENCOUNTER — HOSPITAL ENCOUNTER (OUTPATIENT)
Age: 89
Setting detail: SPECIMEN
Discharge: HOME | End: 2024-01-01
Payer: MEDICARE

## 2024-01-01 LAB
ANION GAP SERPL CALCULATED.3IONS-SCNC: 10 MMOL/L (ref 9–17)
BUN SERPL-MCNC: 33 MG/DL (ref 8–23)
BUN/CREAT SERPL: 17 (ref 9–20)
CALCIUM SERPL-MCNC: 9.2 MG/DL (ref 8.6–10.4)
CHLORIDE SERPL-SCNC: 112 MMOL/L (ref 98–107)
CO2 SERPL-SCNC: 23 MMOL/L (ref 20–31)
CREAT SERPL-MCNC: 2 MG/DL (ref 0.7–1.2)
GFR SERPL CREATININE-BSD FRML MDRD: 31 ML/MIN/1.73M2
GLUCOSE SERPL-MCNC: 124 MG/DL (ref 70–99)
POTASSIUM SERPL-SCNC: 4.1 MMOL/L (ref 3.7–5.3)
SODIUM SERPL-SCNC: 145 MMOL/L (ref 135–144)

## 2024-01-01 PROCEDURE — 80048 BASIC METABOLIC PNL TOTAL CA: CPT

## 2024-01-02 ENCOUNTER — CLINICAL DOCUMENTATION ONLY (OUTPATIENT)
Facility: CLINIC | Age: 89
End: 2024-01-02

## 2024-01-02 ENCOUNTER — HOSPITAL ENCOUNTER (OUTPATIENT)
Age: 89
Setting detail: SPECIMEN
Discharge: HOME | End: 2024-01-02

## 2024-01-02 LAB
ALBUMIN SERPL-MCNC: 3 G/DL (ref 3.5–5.2)
ALBUMIN/GLOB SERPL: 1 {RATIO} (ref 1–2.5)
ALP SERPL-CCNC: 122 U/L (ref 40–129)
ALT SERPL-CCNC: 43 U/L (ref 5–41)
ANION GAP SERPL CALCULATED.3IONS-SCNC: 10 MMOL/L (ref 9–17)
AST SERPL-CCNC: 36 U/L
BASOPHILS # BLD: 0.05 K/UL (ref 0–0.2)
BASOPHILS NFR BLD: 1 % (ref 0–2)
BILIRUB SERPL-MCNC: 0.3 MG/DL (ref 0.3–1.2)
BUN SERPL-MCNC: 35 MG/DL (ref 8–23)
BUN/CREAT SERPL: 16 (ref 9–20)
CALCIUM SERPL-MCNC: 8.8 MG/DL (ref 8.6–10.4)
CHLORIDE SERPL-SCNC: 116 MMOL/L (ref 98–107)
CO2 SERPL-SCNC: 23 MMOL/L (ref 20–31)
CREAT SERPL-MCNC: 2.2 MG/DL (ref 0.7–1.2)
EOSINOPHIL # BLD: 0.94 K/UL (ref 0–0.44)
EOSINOPHILS RELATIVE PERCENT: 11 % (ref 1–4)
ERYTHROCYTE [DISTWIDTH] IN BLOOD BY AUTOMATED COUNT: 14.4 % (ref 11.8–14.4)
GFR SERPL CREATININE-BSD FRML MDRD: 28 ML/MIN/1.73M2
GLUCOSE SERPL-MCNC: 90 MG/DL (ref 70–99)
HCT VFR BLD AUTO: 39.1 % (ref 40.7–50.3)
HGB BLD-MCNC: 12.6 G/DL (ref 13–17)
IMM GRANULOCYTES # BLD AUTO: 0.08 K/UL (ref 0–0.3)
IMM GRANULOCYTES NFR BLD: 1 %
LYMPHOCYTES NFR BLD: 1.47 K/UL (ref 1.1–3.7)
LYMPHOCYTES RELATIVE PERCENT: 17 % (ref 24–43)
MCH RBC QN AUTO: 33.8 PG (ref 25.2–33.5)
MCHC RBC AUTO-ENTMCNC: 32.2 G/DL (ref 28.4–34.8)
MCV RBC AUTO: 104.8 FL (ref 82.6–102.9)
MONOCYTES NFR BLD: 0.86 K/UL (ref 0.1–1.2)
MONOCYTES NFR BLD: 10 % (ref 3–12)
NEUTROPHILS NFR BLD: 60 % (ref 36–65)
NEUTS SEG NFR BLD: 5.09 K/UL (ref 1.5–8.1)
NRBC BLD-RTO: 0 PER 100 WBC
PLATELET # BLD AUTO: 267 K/UL (ref 138–453)
PMV BLD AUTO: 10.9 FL (ref 8.1–13.5)
POTASSIUM SERPL-SCNC: 4.2 MMOL/L (ref 3.7–5.3)
PROT SERPL-MCNC: 6.1 G/DL (ref 6.4–8.3)
RBC # BLD AUTO: 3.73 M/UL (ref 4.21–5.77)
SODIUM SERPL-SCNC: 149 MMOL/L (ref 135–144)
WBC OTHER # BLD: 8.5 K/UL (ref 3.5–11.3)

## 2024-01-02 PROCEDURE — 36415 COLL VENOUS BLD VENIPUNCTURE: CPT

## 2024-01-02 PROCEDURE — 80053 COMPREHEN METABOLIC PANEL: CPT

## 2024-01-02 PROCEDURE — 85025 COMPLETE CBC W/AUTO DIFF WBC: CPT

## 2024-01-03 ENCOUNTER — HOSPITAL ENCOUNTER (OUTPATIENT)
Age: 89
Setting detail: SPECIMEN
Discharge: HOME | End: 2024-01-03

## 2024-01-03 DIAGNOSIS — T79.6XXA TRAUMATIC RHABDOMYOLYSIS, INITIAL ENCOUNTER (HCC): ICD-10-CM

## 2024-01-05 ENCOUNTER — HOSPITAL ENCOUNTER (OUTPATIENT)
Age: 89
Setting detail: SPECIMEN
Discharge: HOME | End: 2024-01-05
Payer: MEDICARE

## 2024-01-05 ENCOUNTER — TRANSCRIBE ORDERS (OUTPATIENT)
Dept: ADMINISTRATIVE | Age: 89
End: 2024-01-05

## 2024-01-05 DIAGNOSIS — R13.10 PROBLEMS WITH SWALLOWING AND MASTICATION: Primary | ICD-10-CM

## 2024-01-05 LAB — CK SERPL-CCNC: 83 U/L (ref 39–308)

## 2024-01-05 PROCEDURE — 82550 ASSAY OF CK (CPK): CPT

## 2024-01-05 PROCEDURE — 36415 COLL VENOUS BLD VENIPUNCTURE: CPT

## 2024-01-08 ENCOUNTER — HOSPITAL ENCOUNTER (OUTPATIENT)
Age: 89
Setting detail: SPECIMEN
Discharge: HOME | End: 2024-01-08

## 2024-01-08 LAB
ALBUMIN SERPL-MCNC: 3.1 G/DL (ref 3.5–5.2)
ALBUMIN/GLOB SERPL: 1.1 {RATIO} (ref 1–2.5)
ALP SERPL-CCNC: 140 U/L (ref 40–129)
ALT SERPL-CCNC: 44 U/L (ref 5–41)
ANION GAP SERPL CALCULATED.3IONS-SCNC: 9 MMOL/L (ref 9–17)
AST SERPL-CCNC: 45 U/L
BASOPHILS # BLD: 0.05 K/UL (ref 0–0.2)
BASOPHILS NFR BLD: 1 % (ref 0–2)
BILIRUB SERPL-MCNC: 0.3 MG/DL (ref 0.3–1.2)
BUN SERPL-MCNC: 32 MG/DL (ref 8–23)
BUN/CREAT SERPL: 15 (ref 9–20)
CALCIUM SERPL-MCNC: 8.8 MG/DL (ref 8.6–10.4)
CHLORIDE SERPL-SCNC: 112 MMOL/L (ref 98–107)
CO2 SERPL-SCNC: 24 MMOL/L (ref 20–31)
CREAT SERPL-MCNC: 2.2 MG/DL (ref 0.7–1.2)
EOSINOPHIL # BLD: 0.13 K/UL (ref 0–0.44)
EOSINOPHILS RELATIVE PERCENT: 2 % (ref 1–4)
ERYTHROCYTE [DISTWIDTH] IN BLOOD BY AUTOMATED COUNT: 14.4 % (ref 11.8–14.4)
GFR SERPL CREATININE-BSD FRML MDRD: 28 ML/MIN/1.73M2
GLUCOSE SERPL-MCNC: 95 MG/DL (ref 70–99)
HCT VFR BLD AUTO: 38.2 % (ref 40.7–50.3)
HGB BLD-MCNC: 12.3 G/DL (ref 13–17)
IMM GRANULOCYTES # BLD AUTO: 0.04 K/UL (ref 0–0.3)
IMM GRANULOCYTES NFR BLD: 1 %
LYMPHOCYTES NFR BLD: 1.12 K/UL (ref 1.1–3.7)
LYMPHOCYTES RELATIVE PERCENT: 17 % (ref 24–43)
MCH RBC QN AUTO: 33.5 PG (ref 25.2–33.5)
MCHC RBC AUTO-ENTMCNC: 32.2 G/DL (ref 28.4–34.8)
MCV RBC AUTO: 104.1 FL (ref 82.6–102.9)
MONOCYTES NFR BLD: 1.15 K/UL (ref 0.1–1.2)
MONOCYTES NFR BLD: 17 % (ref 3–12)
NEUTROPHILS NFR BLD: 62 % (ref 36–65)
NEUTS SEG NFR BLD: 4.11 K/UL (ref 1.5–8.1)
NRBC BLD-RTO: 0 PER 100 WBC
PLATELET # BLD AUTO: 292 K/UL (ref 138–453)
PMV BLD AUTO: 10.4 FL (ref 8.1–13.5)
POTASSIUM SERPL-SCNC: 4.5 MMOL/L (ref 3.7–5.3)
PROT SERPL-MCNC: 6 G/DL (ref 6.4–8.3)
RBC # BLD AUTO: 3.67 M/UL (ref 4.21–5.77)
SODIUM SERPL-SCNC: 145 MMOL/L (ref 135–144)
WBC OTHER # BLD: 6.6 K/UL (ref 3.5–11.3)

## 2024-01-08 PROCEDURE — 80053 COMPREHEN METABOLIC PANEL: CPT

## 2024-01-08 PROCEDURE — 36415 COLL VENOUS BLD VENIPUNCTURE: CPT

## 2024-01-08 PROCEDURE — P9604 ONE-WAY ALLOW PRORATED TRIP: HCPCS

## 2024-01-08 PROCEDURE — 85025 COMPLETE CBC W/AUTO DIFF WBC: CPT

## 2024-01-10 ENCOUNTER — APPOINTMENT (OUTPATIENT)
Dept: VASCULAR LAB | Age: 89
DRG: 871 | End: 2024-01-10
Payer: MEDICARE

## 2024-01-10 ENCOUNTER — APPOINTMENT (OUTPATIENT)
Dept: GENERAL RADIOLOGY | Age: 89
DRG: 871 | End: 2024-01-10
Payer: MEDICARE

## 2024-01-10 ENCOUNTER — TELEPHONE (OUTPATIENT)
Dept: PRIMARY CARE CLINIC | Age: 89
End: 2024-01-10

## 2024-01-10 ENCOUNTER — HOSPITAL ENCOUNTER (INPATIENT)
Age: 89
LOS: 7 days | Discharge: HOSPICE | DRG: 871 | End: 2024-01-17
Attending: EMERGENCY MEDICINE | Admitting: STUDENT IN AN ORGANIZED HEALTH CARE EDUCATION/TRAINING PROGRAM
Payer: MEDICARE

## 2024-01-10 DIAGNOSIS — N28.9 RENAL DYSFUNCTION: ICD-10-CM

## 2024-01-10 DIAGNOSIS — R09.02 HYPOXEMIA: ICD-10-CM

## 2024-01-10 DIAGNOSIS — U07.1 COVID-19 VIRUS INFECTION: ICD-10-CM

## 2024-01-10 DIAGNOSIS — E87.0 SERUM SODIUM ELEVATED: ICD-10-CM

## 2024-01-10 DIAGNOSIS — I95.89 OTHER SPECIFIED HYPOTENSION: ICD-10-CM

## 2024-01-10 DIAGNOSIS — R79.89 ELEVATED TROPONIN: ICD-10-CM

## 2024-01-10 DIAGNOSIS — A41.9 SEPTICEMIA (HCC): Primary | ICD-10-CM

## 2024-01-10 PROBLEM — N18.9 ACUTE KIDNEY INJURY SUPERIMPOSED ON CKD (HCC): Status: ACTIVE | Noted: 2024-01-10

## 2024-01-10 PROBLEM — J96.01 ACUTE RESPIRATORY FAILURE WITH HYPOXIA (HCC): Status: ACTIVE | Noted: 2024-01-10

## 2024-01-10 PROBLEM — N17.9 ACUTE KIDNEY INJURY SUPERIMPOSED ON CKD (HCC): Status: ACTIVE | Noted: 2024-01-10

## 2024-01-10 PROBLEM — R54 ADVANCED AGE: Status: ACTIVE | Noted: 2024-01-10

## 2024-01-10 PROBLEM — R65.20 SEVERE SEPSIS (HCC): Status: ACTIVE | Noted: 2024-01-10

## 2024-01-10 PROBLEM — I21.A1 TYPE 2 MI (MYOCARDIAL INFARCTION) (HCC): Status: ACTIVE | Noted: 2024-01-10

## 2024-01-10 PROBLEM — J96.01 ACUTE HYPOXIC RESPIRATORY FAILURE (HCC): Status: ACTIVE | Noted: 2024-01-10

## 2024-01-10 PROBLEM — J43.1 PANLOBULAR EMPHYSEMA (HCC): Status: ACTIVE | Noted: 2024-01-10

## 2024-01-10 PROBLEM — I95.9 ARTERIAL HYPOTENSION: Status: ACTIVE | Noted: 2024-01-10

## 2024-01-10 PROBLEM — I21.4 NSTEMI (NON-ST ELEVATED MYOCARDIAL INFARCTION) (HCC): Status: ACTIVE | Noted: 2024-01-10

## 2024-01-10 LAB
ALBUMIN SERPL-MCNC: 3.3 G/DL (ref 3.5–5.2)
ALBUMIN/GLOB SERPL: 0.9 {RATIO} (ref 1–2.5)
ALP SERPL-CCNC: 138 U/L (ref 40–129)
ALT SERPL-CCNC: 69 U/L (ref 5–41)
ANION GAP SERPL CALCULATED.3IONS-SCNC: 12 MMOL/L (ref 9–17)
ANION GAP SERPL CALCULATED.3IONS-SCNC: 15 MMOL/L (ref 9–17)
ARTERIAL PATENCY WRIST A: ABNORMAL
AST SERPL-CCNC: 66 U/L
B PARAP IS1001 DNA NPH QL NAA+NON-PROBE: NOT DETECTED
B PERT DNA SPEC QL NAA+PROBE: NOT DETECTED
BACTERIA URNS QL MICRO: ABNORMAL
BASOPHILS # BLD: 0 K/UL (ref 0–0.2)
BASOPHILS NFR BLD: 0 % (ref 0–2)
BDY SITE: ABNORMAL
BILIRUB SERPL-MCNC: 0.5 MG/DL (ref 0.3–1.2)
BILIRUB UR QL STRIP: NEGATIVE
BNP SERPL-MCNC: 1034 PG/ML
BODY TEMPERATURE: 37
BUN SERPL-MCNC: 42 MG/DL (ref 8–23)
BUN SERPL-MCNC: 43 MG/DL (ref 8–23)
BUN/CREAT SERPL: 14 (ref 9–20)
BUN/CREAT SERPL: 15 (ref 9–20)
C DIFF GDH + TOXINS A+B STL QL IA.RAPID: NEGATIVE
C PNEUM DNA NPH QL NAA+NON-PROBE: NOT DETECTED
CALCIUM SERPL-MCNC: 8.4 MG/DL (ref 8.6–10.4)
CALCIUM SERPL-MCNC: 9.3 MG/DL (ref 8.6–10.4)
CHLORIDE SERPL-SCNC: 112 MMOL/L (ref 98–107)
CHLORIDE SERPL-SCNC: 118 MMOL/L (ref 98–107)
CLARITY UR: CLEAR
CO2 SERPL-SCNC: 22 MMOL/L (ref 20–31)
CO2 SERPL-SCNC: 23 MMOL/L (ref 20–31)
COLOR UR: YELLOW
CREAT SERPL-MCNC: 2.9 MG/DL (ref 0.7–1.2)
CREAT SERPL-MCNC: 3.1 MG/DL (ref 0.7–1.2)
CRP SERPL HS-MCNC: 123.1 MG/L (ref 0–5)
D DIMER PPP FEU-MCNC: 5.1 UG/ML FEU (ref 0–0.59)
EKG ATRIAL RATE: 97 BPM
EKG P AXIS: 74 DEGREES
EKG P-R INTERVAL: 164 MS
EKG Q-T INTERVAL: 366 MS
EKG QRS DURATION: 72 MS
EKG QTC CALCULATION (BAZETT): 464 MS
EKG R AXIS: 50 DEGREES
EKG T AXIS: -158 DEGREES
EKG VENTRICULAR RATE: 97 BPM
EOSINOPHIL # BLD: 0 K/UL (ref 0–0.44)
EOSINOPHILS RELATIVE PERCENT: 0 % (ref 1–4)
EPI CELLS #/AREA URNS HPF: ABNORMAL /HPF (ref 0–5)
ERYTHROCYTE [DISTWIDTH] IN BLOOD BY AUTOMATED COUNT: 14.6 % (ref 11.8–14.4)
FIBRINOGEN PPP-MCNC: 585 MG/DL (ref 179–518)
FIO2 ON VENT: 73 %
FLUAV RNA NPH QL NAA+NON-PROBE: NOT DETECTED
FLUBV RNA NPH QL NAA+NON-PROBE: NOT DETECTED
GAS FLOW.O2 O2 DELIVERY SYS: ABNORMAL L/MIN
GFR SERPL CREATININE-BSD FRML MDRD: 19 ML/MIN/1.73M2
GFR SERPL CREATININE-BSD FRML MDRD: 20 ML/MIN/1.73M2
GLUCOSE SERPL-MCNC: 113 MG/DL (ref 70–99)
GLUCOSE SERPL-MCNC: 89 MG/DL (ref 70–99)
GLUCOSE UR STRIP-MCNC: NEGATIVE MG/DL
HADV DNA NPH QL NAA+NON-PROBE: NOT DETECTED
HCO3 ARTERIAL: 20 MMOL/L (ref 22–26)
HCO3 VENOUS: 24.5 MMOL/L (ref 24–30)
HCOV 229E RNA NPH QL NAA+NON-PROBE: NOT DETECTED
HCOV HKU1 RNA NPH QL NAA+NON-PROBE: NOT DETECTED
HCOV NL63 RNA NPH QL NAA+NON-PROBE: NOT DETECTED
HCOV OC43 RNA NPH QL NAA+NON-PROBE: NOT DETECTED
HCT VFR BLD AUTO: 47.9 % (ref 40.7–50.3)
HGB BLD-MCNC: 15.1 G/DL (ref 13–17)
HGB UR QL STRIP.AUTO: ABNORMAL
HMPV RNA NPH QL NAA+NON-PROBE: NOT DETECTED
HPIV1 RNA NPH QL NAA+NON-PROBE: NOT DETECTED
HPIV2 RNA NPH QL NAA+NON-PROBE: NOT DETECTED
HPIV3 RNA NPH QL NAA+NON-PROBE: NOT DETECTED
HPIV4 RNA NPH QL NAA+NON-PROBE: NOT DETECTED
IMM GRANULOCYTES # BLD AUTO: 0.04 K/UL (ref 0–0.3)
IMM GRANULOCYTES NFR BLD: 1 %
KETONES UR STRIP-MCNC: NEGATIVE MG/DL
LACTATE BLDV-SCNC: 2.7 MMOL/L (ref 0.5–1.9)
LACTATE BLDV-SCNC: 2.8 MMOL/L (ref 0.5–1.9)
LACTATE BLDV-SCNC: 2.9 MMOL/L (ref 0.5–1.9)
LACTATE BLDV-SCNC: 3.6 MMOL/L (ref 0.5–1.9)
LEUKOCYTE ESTERASE UR QL STRIP: NEGATIVE
LYMPHOCYTES NFR BLD: 0.42 K/UL (ref 1.1–3.7)
LYMPHOCYTES RELATIVE PERCENT: 10 % (ref 24–43)
M PNEUMO DNA NPH QL NAA+NON-PROBE: NOT DETECTED
MCH RBC QN AUTO: 33.1 PG (ref 25.2–33.5)
MCHC RBC AUTO-ENTMCNC: 31.5 G/DL (ref 28.4–34.8)
MCV RBC AUTO: 105 FL (ref 82.6–102.9)
MONOCYTES NFR BLD: 0.5 K/UL (ref 0.1–1.2)
MONOCYTES NFR BLD: 12 % (ref 3–12)
MORPHOLOGY: NORMAL
MUCOUS THREADS URNS QL MICRO: ABNORMAL
NEGATIVE BASE EXCESS, ART: 2.7 MMOL/L (ref 0–2)
NEGATIVE BASE EXCESS, VEN: 0.1 MMOL/L (ref 0–2)
NEUTROPHILS NFR BLD: 77 % (ref 36–65)
NEUTS SEG NFR BLD: 3.24 K/UL (ref 1.5–8.1)
NITRITE UR QL STRIP: POSITIVE
NRBC BLD-RTO: 0 PER 100 WBC
O2 SAT, ARTERIAL: 89.4 % (ref 95–98)
O2 SAT, VEN: 53.9 % (ref 60–85)
PCO2 ARTERIAL: 29.8 MMHG (ref 35–45)
PCO2, VEN: 39.7 MM HG (ref 39–55)
PH ARTERIAL: 7.45 (ref 7.35–7.45)
PH UR STRIP: 6 [PH] (ref 5–9)
PH VENOUS: 7.41 (ref 7.32–7.42)
PLATELET # BLD AUTO: 369 K/UL (ref 138–453)
PMV BLD AUTO: 11.1 FL (ref 8.1–13.5)
PO2 ARTERIAL: 53.2 MMHG (ref 80–100)
PO2, VEN: 28.3 MM HG (ref 30–50)
POTASSIUM SERPL-SCNC: 4.9 MMOL/L (ref 3.7–5.3)
POTASSIUM SERPL-SCNC: 4.9 MMOL/L (ref 3.7–5.3)
PROCALCITONIN SERPL-MCNC: 14.62 NG/ML
PROCALCITONIN SERPL-MCNC: 4.6 NG/ML (ref 0–0.09)
PROT SERPL-MCNC: 7.1 G/DL (ref 6.4–8.3)
PROT UR STRIP-MCNC: ABNORMAL MG/DL
PT. POSITION: ABNORMAL
RBC # BLD AUTO: 4.56 M/UL (ref 4.21–5.77)
RBC #/AREA URNS HPF: ABNORMAL /HPF (ref 0–2)
RSV RNA NPH QL NAA+NON-PROBE: NOT DETECTED
RV+EV RNA NPH QL NAA+NON-PROBE: NOT DETECTED
SARS-COV-2 RNA NPH QL NAA+NON-PROBE: DETECTED
SODIUM SERPL-SCNC: 150 MMOL/L (ref 135–144)
SODIUM SERPL-SCNC: 152 MMOL/L (ref 135–144)
SP GR UR STRIP: 1.01 (ref 1.01–1.02)
SPECIMEN DESCRIPTION: ABNORMAL
SPECIMEN DESCRIPTION: NORMAL
TROPONIN I SERPL HS-MCNC: 144 NG/L (ref 0–22)
TROPONIN I SERPL HS-MCNC: 145 NG/L (ref 0–22)
TROPONIN I SERPL HS-MCNC: 152 NG/L (ref 0–22)
TROPONIN I SERPL HS-MCNC: 153 NG/L (ref 0–22)
TROPONIN I SERPL HS-MCNC: 176 NG/L (ref 0–22)
UROBILINOGEN UR STRIP-ACNC: NORMAL EU/DL (ref 0–1)
WBC #/AREA URNS HPF: ABNORMAL /HPF (ref 0–5)
WBC OTHER # BLD: 4.2 K/UL (ref 3.5–11.3)

## 2024-01-10 PROCEDURE — 6370000000 HC RX 637 (ALT 250 FOR IP): Performed by: NURSE PRACTITIONER

## 2024-01-10 PROCEDURE — 85379 FIBRIN DEGRADATION QUANT: CPT

## 2024-01-10 PROCEDURE — 96365 THER/PROPH/DIAG IV INF INIT: CPT

## 2024-01-10 PROCEDURE — 2580000003 HC RX 258

## 2024-01-10 PROCEDURE — 83605 ASSAY OF LACTIC ACID: CPT

## 2024-01-10 PROCEDURE — 87506 IADNA-DNA/RNA PROBE TQ 6-11: CPT

## 2024-01-10 PROCEDURE — 94664 DEMO&/EVAL PT USE INHALER: CPT

## 2024-01-10 PROCEDURE — 93005 ELECTROCARDIOGRAM TRACING: CPT | Performed by: EMERGENCY MEDICINE

## 2024-01-10 PROCEDURE — 2000000000 HC ICU R&B

## 2024-01-10 PROCEDURE — 2580000003 HC RX 258: Performed by: INTERNAL MEDICINE

## 2024-01-10 PROCEDURE — 86140 C-REACTIVE PROTEIN: CPT

## 2024-01-10 PROCEDURE — 84145 PROCALCITONIN (PCT): CPT

## 2024-01-10 PROCEDURE — 6360000002 HC RX W HCPCS: Performed by: INTERNAL MEDICINE

## 2024-01-10 PROCEDURE — 99285 EMERGENCY DEPT VISIT HI MDM: CPT

## 2024-01-10 PROCEDURE — 94640 AIRWAY INHALATION TREATMENT: CPT

## 2024-01-10 PROCEDURE — XW033E5 INTRODUCTION OF REMDESIVIR ANTI-INFECTIVE INTO PERIPHERAL VEIN, PERCUTANEOUS APPROACH, NEW TECHNOLOGY GROUP 5: ICD-10-PCS | Performed by: INTERNAL MEDICINE

## 2024-01-10 PROCEDURE — 85384 FIBRINOGEN ACTIVITY: CPT

## 2024-01-10 PROCEDURE — 2580000003 HC RX 258: Performed by: EMERGENCY MEDICINE

## 2024-01-10 PROCEDURE — 2700000000 HC OXYGEN THERAPY PER DAY

## 2024-01-10 PROCEDURE — 87449 NOS EACH ORGANISM AG IA: CPT

## 2024-01-10 PROCEDURE — 81001 URINALYSIS AUTO W/SCOPE: CPT

## 2024-01-10 PROCEDURE — 85025 COMPLETE CBC W/AUTO DIFF WBC: CPT

## 2024-01-10 PROCEDURE — 2580000003 HC RX 258: Performed by: NURSE PRACTITIONER

## 2024-01-10 PROCEDURE — 80053 COMPREHEN METABOLIC PANEL: CPT

## 2024-01-10 PROCEDURE — 71045 X-RAY EXAM CHEST 1 VIEW: CPT

## 2024-01-10 PROCEDURE — 84484 ASSAY OF TROPONIN QUANT: CPT

## 2024-01-10 PROCEDURE — 80048 BASIC METABOLIC PNL TOTAL CA: CPT

## 2024-01-10 PROCEDURE — 93010 ELECTROCARDIOGRAM REPORT: CPT | Performed by: INTERNAL MEDICINE

## 2024-01-10 PROCEDURE — 74019 RADEX ABDOMEN 2 VIEWS: CPT

## 2024-01-10 PROCEDURE — 99223 1ST HOSP IP/OBS HIGH 75: CPT | Performed by: INTERNAL MEDICINE

## 2024-01-10 PROCEDURE — 6360000002 HC RX W HCPCS: Performed by: NURSE PRACTITIONER

## 2024-01-10 PROCEDURE — 99222 1ST HOSP IP/OBS MODERATE 55: CPT | Performed by: INTERNAL MEDICINE

## 2024-01-10 PROCEDURE — 36415 COLL VENOUS BLD VENIPUNCTURE: CPT

## 2024-01-10 PROCEDURE — 36600 WITHDRAWAL OF ARTERIAL BLOOD: CPT

## 2024-01-10 PROCEDURE — 87040 BLOOD CULTURE FOR BACTERIA: CPT

## 2024-01-10 PROCEDURE — 82805 BLOOD GASES W/O2 SATURATION: CPT

## 2024-01-10 PROCEDURE — 83880 ASSAY OF NATRIURETIC PEPTIDE: CPT

## 2024-01-10 PROCEDURE — 99223 1ST HOSP IP/OBS HIGH 75: CPT | Performed by: FAMILY MEDICINE

## 2024-01-10 PROCEDURE — 87324 CLOSTRIDIUM AG IA: CPT

## 2024-01-10 PROCEDURE — 6360000002 HC RX W HCPCS: Performed by: EMERGENCY MEDICINE

## 2024-01-10 PROCEDURE — 94667 MNPJ CHEST WALL 1ST: CPT

## 2024-01-10 PROCEDURE — 87086 URINE CULTURE/COLONY COUNT: CPT

## 2024-01-10 PROCEDURE — 0202U NFCT DS 22 TRGT SARS-COV-2: CPT

## 2024-01-10 PROCEDURE — 93970 EXTREMITY STUDY: CPT

## 2024-01-10 RX ORDER — ENOXAPARIN SODIUM 100 MG/ML
30 INJECTION SUBCUTANEOUS DAILY
Status: DISCONTINUED | OUTPATIENT
Start: 2024-01-10 | End: 2024-01-10

## 2024-01-10 RX ORDER — POLYETHYLENE GLYCOL 3350 17 G/17G
17 POWDER, FOR SOLUTION ORAL DAILY
Status: DISCONTINUED | OUTPATIENT
Start: 2024-01-10 | End: 2024-01-17

## 2024-01-10 RX ORDER — AMIODARONE HYDROCHLORIDE 200 MG/1
200 TABLET ORAL DAILY
Status: DISCONTINUED | OUTPATIENT
Start: 2024-01-10 | End: 2024-01-17 | Stop reason: HOSPADM

## 2024-01-10 RX ORDER — BUPROPION HYDROCHLORIDE 100 MG/1
100 TABLET, EXTENDED RELEASE ORAL 2 TIMES DAILY
Status: DISCONTINUED | OUTPATIENT
Start: 2024-01-10 | End: 2024-01-17

## 2024-01-10 RX ORDER — ENOXAPARIN SODIUM 100 MG/ML
1 INJECTION SUBCUTANEOUS DAILY
Status: DISCONTINUED | OUTPATIENT
Start: 2024-01-10 | End: 2024-01-17

## 2024-01-10 RX ORDER — DEXAMETHASONE 4 MG/1
6 TABLET ORAL DAILY
Status: COMPLETED | OUTPATIENT
Start: 2024-01-10 | End: 2024-01-15

## 2024-01-10 RX ORDER — IPRATROPIUM BROMIDE AND ALBUTEROL SULFATE 2.5; .5 MG/3ML; MG/3ML
1 SOLUTION RESPIRATORY (INHALATION)
Status: DISCONTINUED | OUTPATIENT
Start: 2024-01-10 | End: 2024-01-17

## 2024-01-10 RX ORDER — 0.9 % SODIUM CHLORIDE 0.9 %
30 INTRAVENOUS SOLUTION INTRAVENOUS ONCE
Status: COMPLETED | OUTPATIENT
Start: 2024-01-10 | End: 2024-01-10

## 2024-01-10 RX ORDER — ATORVASTATIN CALCIUM 20 MG/1
20 TABLET, FILM COATED ORAL DAILY
Status: DISCONTINUED | OUTPATIENT
Start: 2024-01-10 | End: 2024-01-17

## 2024-01-10 RX ORDER — ASPIRIN 81 MG/1
81 TABLET ORAL DAILY
Status: DISCONTINUED | OUTPATIENT
Start: 2024-01-10 | End: 2024-01-17

## 2024-01-10 RX ORDER — DEXTROSE MONOHYDRATE 50 MG/ML
INJECTION, SOLUTION INTRAVENOUS CONTINUOUS
Status: DISCONTINUED | OUTPATIENT
Start: 2024-01-10 | End: 2024-01-11

## 2024-01-10 RX ORDER — SODIUM CHLORIDE, SODIUM LACTATE, POTASSIUM CHLORIDE, AND CALCIUM CHLORIDE .6; .31; .03; .02 G/100ML; G/100ML; G/100ML; G/100ML
500 INJECTION, SOLUTION INTRAVENOUS ONCE
Status: COMPLETED | OUTPATIENT
Start: 2024-01-11 | End: 2024-01-11

## 2024-01-10 RX ORDER — LEVOTHYROXINE SODIUM 88 UG/1
88 TABLET ORAL DAILY
Status: DISCONTINUED | OUTPATIENT
Start: 2024-01-11 | End: 2024-01-17

## 2024-01-10 RX ORDER — MIDODRINE HYDROCHLORIDE 5 MG/1
5 TABLET ORAL 2 TIMES DAILY WITH MEALS
Status: DISCONTINUED | OUTPATIENT
Start: 2024-01-10 | End: 2024-01-12

## 2024-01-10 RX ORDER — SODIUM CHLORIDE 9 MG/ML
INJECTION, SOLUTION INTRAVENOUS PRN
Status: DISCONTINUED | OUTPATIENT
Start: 2024-01-10 | End: 2024-01-17 | Stop reason: HOSPADM

## 2024-01-10 RX ORDER — SODIUM CHLORIDE 0.9 % (FLUSH) 0.9 %
5-40 SYRINGE (ML) INJECTION PRN
Status: DISCONTINUED | OUTPATIENT
Start: 2024-01-10 | End: 2024-01-17 | Stop reason: HOSPADM

## 2024-01-10 RX ORDER — ACETAMINOPHEN 650 MG/1
650 SUPPOSITORY RECTAL EVERY 6 HOURS PRN
Status: DISCONTINUED | OUTPATIENT
Start: 2024-01-10 | End: 2024-01-17 | Stop reason: HOSPADM

## 2024-01-10 RX ORDER — ALBUTEROL SULFATE 2.5 MG/3ML
2.5 SOLUTION RESPIRATORY (INHALATION) EVERY 6 HOURS PRN
Status: DISCONTINUED | OUTPATIENT
Start: 2024-01-10 | End: 2024-01-17 | Stop reason: HOSPADM

## 2024-01-10 RX ORDER — WATER 10 ML/10ML
INJECTION INTRAMUSCULAR; INTRAVENOUS; SUBCUTANEOUS
Status: COMPLETED
Start: 2024-01-10 | End: 2024-01-10

## 2024-01-10 RX ORDER — ACETAMINOPHEN 325 MG/1
650 TABLET ORAL EVERY 6 HOURS PRN
Status: DISCONTINUED | OUTPATIENT
Start: 2024-01-10 | End: 2024-01-17 | Stop reason: HOSPADM

## 2024-01-10 RX ORDER — ASCORBIC ACID 500 MG
1000 TABLET ORAL 2 TIMES DAILY
Status: DISCONTINUED | OUTPATIENT
Start: 2024-01-10 | End: 2024-01-17

## 2024-01-10 RX ORDER — SODIUM CHLORIDE 0.9 % (FLUSH) 0.9 %
5-40 SYRINGE (ML) INJECTION EVERY 12 HOURS SCHEDULED
Status: DISCONTINUED | OUTPATIENT
Start: 2024-01-10 | End: 2024-01-17 | Stop reason: HOSPADM

## 2024-01-10 RX ORDER — MULTIVITAMIN WITH IRON
1 TABLET ORAL DAILY
Status: DISCONTINUED | OUTPATIENT
Start: 2024-01-10 | End: 2024-01-17

## 2024-01-10 RX ORDER — FINASTERIDE 5 MG/1
5 TABLET, FILM COATED ORAL DAILY
Status: DISCONTINUED | OUTPATIENT
Start: 2024-01-10 | End: 2024-01-17

## 2024-01-10 RX ORDER — 0.9 % SODIUM CHLORIDE 0.9 %
30 INTRAVENOUS SOLUTION INTRAVENOUS PRN
Status: DISCONTINUED | OUTPATIENT
Start: 2024-01-10 | End: 2024-01-17 | Stop reason: HOSPADM

## 2024-01-10 RX ORDER — TAMSULOSIN HYDROCHLORIDE 0.4 MG/1
0.4 CAPSULE ORAL DAILY
Status: DISCONTINUED | OUTPATIENT
Start: 2024-01-10 | End: 2024-01-17

## 2024-01-10 RX ADMIN — WATER 40 ML: 1 INJECTION INTRAMUSCULAR; INTRAVENOUS; SUBCUTANEOUS at 21:40

## 2024-01-10 RX ADMIN — IPRATROPIUM BROMIDE AND ALBUTEROL SULFATE 1 DOSE: .5; 3 SOLUTION RESPIRATORY (INHALATION) at 20:14

## 2024-01-10 RX ADMIN — ENOXAPARIN SODIUM 70 MG: 100 INJECTION SUBCUTANEOUS at 23:39

## 2024-01-10 RX ADMIN — SODIUM CHLORIDE 1983 ML: 9 INJECTION, SOLUTION INTRAVENOUS at 08:52

## 2024-01-10 RX ADMIN — IPRATROPIUM BROMIDE AND ALBUTEROL SULFATE 1 DOSE: .5; 3 SOLUTION RESPIRATORY (INHALATION) at 15:32

## 2024-01-10 RX ADMIN — MIDODRINE HYDROCHLORIDE 5 MG: 5 TABLET ORAL at 17:03

## 2024-01-10 RX ADMIN — OXYCODONE HYDROCHLORIDE AND ACETAMINOPHEN 1000 MG: 500 TABLET ORAL at 21:41

## 2024-01-10 RX ADMIN — TOCILIZUMAB 600 MG: 20 INJECTION, SOLUTION, CONCENTRATE INTRAVENOUS at 22:14

## 2024-01-10 RX ADMIN — DEXAMETHASONE 6 MG: 4 TABLET ORAL at 21:44

## 2024-01-10 RX ADMIN — DEXTROSE MONOHYDRATE: 50 INJECTION, SOLUTION INTRAVENOUS at 12:45

## 2024-01-10 RX ADMIN — SODIUM CHLORIDE, POTASSIUM CHLORIDE, SODIUM LACTATE AND CALCIUM CHLORIDE 500 ML: 600; 310; 30; 20 INJECTION, SOLUTION INTRAVENOUS at 23:39

## 2024-01-10 RX ADMIN — DEXTROSE MONOHYDRATE: 50 INJECTION, SOLUTION INTRAVENOUS at 21:37

## 2024-01-10 RX ADMIN — ATORVASTATIN CALCIUM 20 MG: 20 TABLET, FILM COATED ORAL at 21:44

## 2024-01-10 RX ADMIN — BUPROPION HYDROCHLORIDE 100 MG: 100 TABLET, FILM COATED, EXTENDED RELEASE ORAL at 21:41

## 2024-01-10 RX ADMIN — ENOXAPARIN SODIUM 30 MG: 100 INJECTION SUBCUTANEOUS at 12:48

## 2024-01-10 RX ADMIN — PIPERACILLIN AND TAZOBACTAM 3375 MG: 3; .375 INJECTION, POWDER, FOR SOLUTION INTRAVENOUS at 10:00

## 2024-01-10 RX ADMIN — REMDESIVIR 200 MG: 100 INJECTION, POWDER, LYOPHILIZED, FOR SOLUTION INTRAVENOUS at 21:40

## 2024-01-10 RX ADMIN — GUAIFENESIN, DEXTROMETHORPHAN HBR 1 TABLET: 600; 30 TABLET ORAL at 21:41

## 2024-01-10 ASSESSMENT — LIFESTYLE VARIABLES
HOW OFTEN DO YOU HAVE A DRINK CONTAINING ALCOHOL: NEVER
HOW MANY STANDARD DRINKS CONTAINING ALCOHOL DO YOU HAVE ON A TYPICAL DAY: PATIENT DOES NOT DRINK

## 2024-01-10 ASSESSMENT — PAIN SCALES - GENERAL
PAINLEVEL_OUTOF10: 0
PAINLEVEL_OUTOF10: 0

## 2024-01-10 NOTE — H&P
PROT 7.1 01/10/2024    LABALBU 3.3 (L) 01/10/2024    BILITOT 0.5 01/10/2024    ALKPHOS 138 (H) 01/10/2024    ALT 69 (H) 01/10/2024    AST 66 (H) 01/10/2024     UA:   Lab Results   Component Value Date    COLORU Yellow 01/10/2024    SPECGRAV 1.015 01/10/2024    WBCUA 0 TO 2 01/10/2024    RBCUA 0 TO 2 01/10/2024    EPITHUA 0 TO 2 01/10/2024    LEUKOCYTESUR NEGATIVE 01/10/2024    GLUCOSEU NEGATIVE 01/10/2024    KETUA NEGATIVE 01/10/2024    PROTEINU TRACE (A) 01/10/2024    HGBUR TRACE (A) 01/10/2024    CASTUA NOT REPORTED 10/28/2019    CRYSTUA NOT REPORTED 10/28/2019    BACTERIA 2+ (A) 01/10/2024    YEAST NOT REPORTED 10/28/2019     Lactic Acid:    Latest Reference Range & Units 01/10/24 08:35 01/10/24 10:46   Lactic Acid, Sepsis 0.5 - 1.9 mmol/L 3.6 (H) 2.7 (H)   (H): Data is abnormally high    PT/INR:  Lab Results   Component Value Date/Time    PROTIME 14.2 12/27/2023 01:34 PM    INR 1.1 12/27/2023 01:34 PM     Troponin:   Latest Reference Range & Units 01/10/24 08:35 01/10/24 10:53   Troponin, High Sensitivity 0 - 22 ng/L 176 (HH) 153 (HH)   (HH): Data is critically high    VBGs:    Latest Reference Range & Units 01/10/24 08:03   pH, Hugo 7.32 - 7.42  7.408   pCO2, Hugo 39 - 55 mm Hg 39.7   pO2, Hugo 30.0 - 50.0 mm Hg 28.3 (L)   HCO3, Venous 24.0 - 30.0 mmol/L 24.5   Negative Base Excess, Hugo 0.0 - 2.0 mmol/L 0.1   O2 Sat, Hugo 60.0 - 85.0 % 53.9 (L)   (L): Data is abnormally low      Imaging Data:  XR CHEST PORTABLE   Final Result   Coarse reticulation involving bilateral lungs which may represent   interstitial lung disease or atypical pneumonia, similar to the December 27th   exam.  Left lung base scarring versus recurrent atelectasis.                 Assessment:    Principal Problem:    Severe sepsis (HCC)  Active Problems:    Type 2 MI (myocardial infarction) (HCC)    Acute kidney injury superimposed on CKD (HCC)    Acute respiratory failure with hypoxia (HCC)    COVID-19 virus infection - 1/5/2024  Resolved

## 2024-01-10 NOTE — TELEPHONE ENCOUNTER
This is a new patient of yours scheduled for February. Patient currently in ICU with Covid on Full oxygen. Patient son wanted you to know. [facility] patient transferring to you.

## 2024-01-10 NOTE — ED PROVIDER NOTES
[facility] ED  EMERGENCY DEPARTMENT ENCOUNTER      Pt Name: Jonathan Iglesias  MRN: [id number]  Birthdate 1934  Date of evaluation: 1/10/2024  Provider: Travon Kaur MD  Time Note started  8:01 AM EST  1/10/24           NURSING NOTES REVIEWED     Pt evaluated in a timely manner      CURRENT MEDICATIONS       Previous Medications    AMIODARONE (CORDARONE) 200 MG TABLET    Take 1 tablet by mouth daily    ASPIRIN 81 MG TABLET    Take 1 tablet by mouth daily    ATORVASTATIN (LIPITOR) 20 MG TABLET    daily     BUPROPION (WELLBUTRIN SR) 100 MG EXTENDED RELEASE TABLET    Take 1 tablet by mouth 2 times daily    FINASTERIDE (PROSCAR) 5 MG TABLET    Take 1 tablet by mouth daily    LEVOTHYROXINE (SYNTHROID) 88 MCG TABLET    Take 1 tablet by mouth daily    MIDODRINE (PROAMATINE) 5 MG TABLET    Take 1 tablet by mouth 2 times daily (with meals)    MULTIPLE VITAMIN (MULTI VITAMIN DAILY PO)    Take by mouth daily    OMEGA-3 FATTY ACIDS (FISH OIL) 1000 MG CAPS    Take 1 capsule by mouth 2 times daily    POLYETHYLENE GLYCOL (GLYCOLAX) 17 GM/SCOOP POWDER    Take 17 g by mouth daily    TAMSULOSIN (FLOMAX) 0.4 MG CAPSULE    Take 1 capsule by mouth daily    VITAMIN C (ASCORBIC ACID) 500 MG TABLET    Take 2 tablets by mouth 2 times daily       ALLERGIES     Patient has no known allergies.    FAMILY HISTORY       Family History   Problem Relation Age of Onset    Depression Mother     Heart Attack Mother     Breast Cancer Sister           SOCIAL HISTORY       Social History     Socioeconomic History    Marital status:    Tobacco Use    Smoking status: Former     Current packs/day: 0.00     Types: Cigarettes     Quit date: 1978     Years since quittin.0    Smokeless tobacco: Never   Vaping Use    Vaping Use: Never used   Substance and Sexual Activity    Alcohol use: No    Drug use: Never     Social Determinants of Health     Financial Resource Strain: Low Risk  (2021)    Overall Financial Resource Strain

## 2024-01-11 LAB
ALBUMIN SERPL-MCNC: 2.4 G/DL (ref 3.5–5.2)
ALBUMIN/GLOB SERPL: 0.8 {RATIO} (ref 1–2.5)
ALP SERPL-CCNC: 86 U/L (ref 40–129)
ALT SERPL-CCNC: 45 U/L (ref 5–41)
ANION GAP SERPL CALCULATED.3IONS-SCNC: 10 MMOL/L (ref 9–17)
AST SERPL-CCNC: 57 U/L
BILIRUB SERPL-MCNC: 0.3 MG/DL (ref 0.3–1.2)
BUN SERPL-MCNC: 48 MG/DL (ref 8–23)
BUN/CREAT SERPL: 17 (ref 9–20)
CALCIUM SERPL-MCNC: 8.3 MG/DL (ref 8.6–10.4)
CHLORIDE SERPL-SCNC: 114 MMOL/L (ref 98–107)
CO2 SERPL-SCNC: 19 MMOL/L (ref 20–31)
CREAT SERPL-MCNC: 2.8 MG/DL (ref 0.7–1.2)
CRP SERPL HS-MCNC: 240.8 MG/L (ref 0–5)
ECHO BSA: 1.88 M2
EKG ATRIAL RATE: 61 BPM
EKG P AXIS: 3 DEGREES
EKG P-R INTERVAL: 184 MS
EKG Q-T INTERVAL: 440 MS
EKG QRS DURATION: 104 MS
EKG QTC CALCULATION (BAZETT): 442 MS
EKG R AXIS: -3 DEGREES
EKG T AXIS: -112 DEGREES
EKG VENTRICULAR RATE: 61 BPM
FERRITIN SERPL-MCNC: 837 NG/ML (ref 30–400)
GFR SERPL CREATININE-BSD FRML MDRD: 21 ML/MIN/1.73M2
GLUCOSE SERPL-MCNC: 189 MG/DL (ref 70–99)
MICROORGANISM SPEC CULT: NORMAL
POTASSIUM SERPL-SCNC: 3.9 MMOL/L (ref 3.7–5.3)
PROT SERPL-MCNC: 5.4 G/DL (ref 6.4–8.3)
SODIUM SERPL-SCNC: 143 MMOL/L (ref 135–144)
SPECIMEN DESCRIPTION: NORMAL

## 2024-01-11 PROCEDURE — 80053 COMPREHEN METABOLIC PANEL: CPT

## 2024-01-11 PROCEDURE — 94669 MECHANICAL CHEST WALL OSCILL: CPT

## 2024-01-11 PROCEDURE — 2700000000 HC OXYGEN THERAPY PER DAY

## 2024-01-11 PROCEDURE — 94664 DEMO&/EVAL PT USE INHALER: CPT

## 2024-01-11 PROCEDURE — 86140 C-REACTIVE PROTEIN: CPT

## 2024-01-11 PROCEDURE — 3E033XZ INTRODUCTION OF VASOPRESSOR INTO PERIPHERAL VEIN, PERCUTANEOUS APPROACH: ICD-10-PCS | Performed by: INTERNAL MEDICINE

## 2024-01-11 PROCEDURE — 2580000003 HC RX 258: Performed by: INTERNAL MEDICINE

## 2024-01-11 PROCEDURE — 2580000003 HC RX 258: Performed by: NURSE PRACTITIONER

## 2024-01-11 PROCEDURE — 36415 COLL VENOUS BLD VENIPUNCTURE: CPT

## 2024-01-11 PROCEDURE — 6370000000 HC RX 637 (ALT 250 FOR IP): Performed by: NURSE PRACTITIONER

## 2024-01-11 PROCEDURE — 94761 N-INVAS EAR/PLS OXIMETRY MLT: CPT

## 2024-01-11 PROCEDURE — 2500000003 HC RX 250 WO HCPCS

## 2024-01-11 PROCEDURE — 6360000002 HC RX W HCPCS: Performed by: INTERNAL MEDICINE

## 2024-01-11 PROCEDURE — 93010 ELECTROCARDIOGRAM REPORT: CPT | Performed by: INTERNAL MEDICINE

## 2024-01-11 PROCEDURE — 2580000003 HC RX 258

## 2024-01-11 PROCEDURE — 02HV33Z INSERTION OF INFUSION DEVICE INTO SUPERIOR VENA CAVA, PERCUTANEOUS APPROACH: ICD-10-PCS | Performed by: INTERNAL MEDICINE

## 2024-01-11 PROCEDURE — 99233 SBSQ HOSP IP/OBS HIGH 50: CPT | Performed by: INTERNAL MEDICINE

## 2024-01-11 PROCEDURE — 82728 ASSAY OF FERRITIN: CPT

## 2024-01-11 PROCEDURE — 93970 EXTREMITY STUDY: CPT | Performed by: SURGERY

## 2024-01-11 PROCEDURE — 99232 SBSQ HOSP IP/OBS MODERATE 35: CPT | Performed by: INTERNAL MEDICINE

## 2024-01-11 PROCEDURE — 93005 ELECTROCARDIOGRAM TRACING: CPT | Performed by: NURSE PRACTITIONER

## 2024-01-11 PROCEDURE — 94640 AIRWAY INHALATION TREATMENT: CPT

## 2024-01-11 PROCEDURE — 6360000002 HC RX W HCPCS: Performed by: FAMILY MEDICINE

## 2024-01-11 PROCEDURE — 2000000000 HC ICU R&B

## 2024-01-11 RX ORDER — SODIUM CHLORIDE 9 MG/ML
25 INJECTION, SOLUTION INTRAVENOUS PRN
Status: DISCONTINUED | OUTPATIENT
Start: 2024-01-11 | End: 2024-01-17 | Stop reason: HOSPADM

## 2024-01-11 RX ORDER — DOPAMINE HYDROCHLORIDE 160 MG/100ML
1-20 INJECTION, SOLUTION INTRAVENOUS CONTINUOUS
Status: DISCONTINUED | OUTPATIENT
Start: 2024-01-11 | End: 2024-01-14

## 2024-01-11 RX ORDER — SODIUM CHLORIDE 0.9 % (FLUSH) 0.9 %
5-40 SYRINGE (ML) INJECTION PRN
Status: DISCONTINUED | OUTPATIENT
Start: 2024-01-11 | End: 2024-01-17 | Stop reason: HOSPADM

## 2024-01-11 RX ORDER — NOREPINEPHRINE BITARTRATE 0.06 MG/ML
1-100 INJECTION, SOLUTION INTRAVENOUS CONTINUOUS
Status: DISCONTINUED | OUTPATIENT
Start: 2024-01-11 | End: 2024-01-14

## 2024-01-11 RX ORDER — SODIUM CHLORIDE 0.9 % (FLUSH) 0.9 %
5-40 SYRINGE (ML) INJECTION EVERY 12 HOURS SCHEDULED
Status: DISCONTINUED | OUTPATIENT
Start: 2024-01-11 | End: 2024-01-17 | Stop reason: HOSPADM

## 2024-01-11 RX ORDER — LIDOCAINE HYDROCHLORIDE 10 MG/ML
5 INJECTION, SOLUTION INFILTRATION; PERINEURAL ONCE
Status: DISCONTINUED | OUTPATIENT
Start: 2024-01-11 | End: 2024-01-17

## 2024-01-11 RX ORDER — SODIUM CHLORIDE 450 MG/100ML
INJECTION, SOLUTION INTRAVENOUS CONTINUOUS
Status: DISCONTINUED | OUTPATIENT
Start: 2024-01-11 | End: 2024-01-14

## 2024-01-11 RX ADMIN — ENOXAPARIN SODIUM 70 MG: 100 INJECTION SUBCUTANEOUS at 09:58

## 2024-01-11 RX ADMIN — Medication 5 MCG/MIN: at 00:37

## 2024-01-11 RX ADMIN — MIDODRINE HYDROCHLORIDE 5 MG: 5 TABLET ORAL at 17:25

## 2024-01-11 RX ADMIN — SODIUM CHLORIDE, PRESERVATIVE FREE 10 ML: 5 INJECTION INTRAVENOUS at 20:11

## 2024-01-11 RX ADMIN — ASPIRIN 81 MG: 81 TABLET, COATED ORAL at 09:59

## 2024-01-11 RX ADMIN — DEXAMETHASONE 6 MG: 4 TABLET ORAL at 09:59

## 2024-01-11 RX ADMIN — IPRATROPIUM BROMIDE AND ALBUTEROL SULFATE 1 DOSE: .5; 3 SOLUTION RESPIRATORY (INHALATION) at 15:58

## 2024-01-11 RX ADMIN — OXYCODONE HYDROCHLORIDE AND ACETAMINOPHEN 1000 MG: 500 TABLET ORAL at 20:11

## 2024-01-11 RX ADMIN — IPRATROPIUM BROMIDE AND ALBUTEROL SULFATE 1 DOSE: .5; 3 SOLUTION RESPIRATORY (INHALATION) at 05:24

## 2024-01-11 RX ADMIN — TAMSULOSIN HYDROCHLORIDE 0.4 MG: 0.4 CAPSULE ORAL at 10:00

## 2024-01-11 RX ADMIN — MIDODRINE HYDROCHLORIDE 5 MG: 5 TABLET ORAL at 10:00

## 2024-01-11 RX ADMIN — BUPROPION HYDROCHLORIDE 100 MG: 100 TABLET, FILM COATED, EXTENDED RELEASE ORAL at 20:11

## 2024-01-11 RX ADMIN — SODIUM CHLORIDE: 4.5 INJECTION, SOLUTION INTRAVENOUS at 09:51

## 2024-01-11 RX ADMIN — GUAIFENESIN, DEXTROMETHORPHAN HBR 1 TABLET: 600; 30 TABLET ORAL at 20:11

## 2024-01-11 RX ADMIN — IPRATROPIUM BROMIDE AND ALBUTEROL SULFATE 1 DOSE: .5; 3 SOLUTION RESPIRATORY (INHALATION) at 20:42

## 2024-01-11 RX ADMIN — OXYCODONE HYDROCHLORIDE AND ACETAMINOPHEN 1000 MG: 500 TABLET ORAL at 09:59

## 2024-01-11 RX ADMIN — IPRATROPIUM BROMIDE AND ALBUTEROL SULFATE 1 DOSE: .5; 3 SOLUTION RESPIRATORY (INHALATION) at 11:08

## 2024-01-11 RX ADMIN — ATORVASTATIN CALCIUM 20 MG: 20 TABLET, FILM COATED ORAL at 10:00

## 2024-01-11 RX ADMIN — GUAIFENESIN, DEXTROMETHORPHAN HBR 1 TABLET: 600; 30 TABLET ORAL at 10:11

## 2024-01-11 RX ADMIN — BUPROPION HYDROCHLORIDE 100 MG: 100 TABLET, FILM COATED, EXTENDED RELEASE ORAL at 09:59

## 2024-01-11 RX ADMIN — CEFEPIME 2000 MG: 2 INJECTION, POWDER, FOR SOLUTION INTRAVENOUS at 09:55

## 2024-01-11 RX ADMIN — LEVOTHYROXINE SODIUM 88 MCG: 0.09 TABLET ORAL at 09:59

## 2024-01-11 RX ADMIN — SODIUM CHLORIDE: 4.5 INJECTION, SOLUTION INTRAVENOUS at 19:14

## 2024-01-11 RX ADMIN — DOPAMINE HYDROCHLORIDE 2.5 MCG/KG/MIN: 160 INJECTION, SOLUTION INTRAVENOUS at 09:53

## 2024-01-11 RX ADMIN — REMDESIVIR 100 MG: 100 INJECTION, POWDER, LYOPHILIZED, FOR SOLUTION INTRAVENOUS at 21:35

## 2024-01-11 RX ADMIN — THERA TABS 1 TABLET: TAB at 10:11

## 2024-01-11 RX ADMIN — SODIUM CHLORIDE, PRESERVATIVE FREE 10 ML: 5 INJECTION INTRAVENOUS at 20:12

## 2024-01-11 RX ADMIN — FINASTERIDE 5 MG: 5 TABLET, FILM COATED ORAL at 10:00

## 2024-01-11 ASSESSMENT — PAIN SCALES - GENERAL
PAINLEVEL_OUTOF10: 0

## 2024-01-11 NOTE — ACP (ADVANCE CARE PLANNING)
Advance Care Planning     Advance Care Planning Activator (Inpatient)  Conversation Note      Date of ACP Conversation: 1/11/2024     Conversation Conducted with:  Healthcare Decision Maker: Next of Kin by law (only applies in absence of above) (name) Myles Hill    ACP Activator: Karen Gonzales RN        Health Care Decision Maker:     Current Designated Health Care Decision Maker:     Primary Decision Maker: Myles Hill - [facility] - [phone]  Care Preferences    Ventilation:  \"If you were in your present state of health and suddenly became very ill and were unable to breathe on your own, what would your preference be about the use of a ventilator (breathing machine) if it were available to you?\"      Would the patient desire the use of ventilator (breathing machine)?: no    \"If your health worsens and it becomes clear that your chance of recovery is unlikely, what would your preference be about the use of a ventilator (breathing machine) if it were available to you?\"     Would the patient desire the use of ventilator (breathing machine)?: No      Resuscitation  \"CPR works best to restart the heart when there is a sudden event, like a heart attack, in someone who is otherwise healthy. Unfortunately, CPR does not typically restart the heart for people who have serious health conditions or who are very sick.\"    \"In the event your heart stopped as a result of an underlying serious health condition, would you want attempts to be made to restart your heart (answer \"yes\" for attempt to resuscitate) or would you prefer a natural death (answer \"no\" for do not attempt to resuscitate)?\" no       [x] Yes   [] No   Educated Patient / Decision Maker regarding differences between Advance Directives and portable DNR orders.    Length of ACP Conversation in minutes:  10    Conversation Outcomes:  ACP discussion completed and Portable Do Not Resuscitate prepared for Provider review and signature    Follow-up plan:

## 2024-01-11 NOTE — CONSULTS
Remdesivir Initiation Note    This patient meets criteria for initiation of remdesivir based on the following:  Proven COVID-19  Moderate disease (Requiring supplemental O2)  Acceptable hepatic function (ALT within 5 times ULN)    Exclusion Criteria:  Severe disease requiring invasive or non-invasive mechanical ventilation (includes HFNC & BiPAP)  Could consider use in patients requiring high flow if early on in the disease course (based on symptom duration)  Use of more than 1 vasopressor prior to remdesivir initiation  Already improving on supportive treatment and/or impending discharge  Patients in whom the clinical team think death is in the immediate short-term where remdesivir is unlikely to change the clinical outcome      Latest Reference Range & Units 01/11/24 05:17   ALT 5 - 41 U/L 45 (H)   AST <40 U/L 57 (H)   (H): Data is abnormally high    Liver function tests will be monitored daily while on remdesivir.   Pam Marcano, PharmD, 1/11/2024 8:13 AM    
The Pharmacist should review the patient information to make sure criteria for use is met prior to dispensing.    If the documentation does not support the criteria, the pharmacist should call the MD to verify the criteria is met.  It is preferred that the provider is ID or pulmonary specialist, however the pharmacist can use clinical judgement as to the appropriateness of the order.    [facility] Tocilizumab Criteria for Use    Criteria **All criteria need to be met to receive Tocilizumab for COVID-19 patients**   Age  >18 years old    Clinical Status  Within 7 days of symptom onset OR within 2 days of hospital admission   AND  Within 24 hours of vital (respiratory/cardiovascular) organ support initiation*   Concomitant Therapy Receiving systemic corticosteroids    Oxygen Saturation  <92% OR requiring oxygen    CRP   (if available) >7.5 mg/dL   (>75mg/L)   Contraindications Invasive active mycobacterial or fungal infection  Platelets < 100,000 or active bleeding   Not receiving systemic steroids    Ordering Provider Type  ID, intensivists, pulmonology (where available, see above)     *Respiratory support includes but is not limited to: Non-invasive/invasive ventilation   *Cardiovascular support includes but is not limited to: Vasopressor support     I  Weight-Based Tocilizumab Dose (x 1 dose only)  WHEN COMPOUNDED FROM IV VIAL  Patient Weight (ABW, in kg) Tocilizumab (Actemra) Dose   <= 40 kg 8 mg/kg  x 1 dose   >40 kg - <= 65 kg 400 mg x 1 dose   > 65 - <=90 kg 600 mg  x 1 dose   > 90 kg  800mg  x 1 dose        Latest Reference Range & Units 01/11/24 05:17   CRP 0.0 - 5.0 mg/L 240.8 (H)   (H): Data is abnormally high    Actemra 600 mg IVPB x 1 on 1/10/24    Thank you for the consult,  Pam Marcano, Melissa, 1/11/2024 8:17 AM    
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 Iglesias meets criteria for a Type 2 MI defined by a rise and fall of troponin with at least one value above the 99th percentile and evidence of an imbalance between myocardial oxygen supply and demand unrelated to coronary thrombosis, evidenced by symptoms of acute myocardial ischemia which has manifested as the development of heart failure and shortness of breath at rest.  Because of this history, I ordered a echocardiogram to better assess the severity of this problem.        Recent history of Syncope/Near Syncope with recent history of non sustained ventricular tachycardia as well as CABG 9 years ago. Says he has not had a stress test in at least 5 years.  Pharmacological Management: Continue midodrine .         Atherosclerotic Heart Disease: Low risk stress test in late December 2023.  Antiplatelet Agent: Continue Aspirin 81 mg daily.   Beta Blocker: Contraindicated due to history of symptomatic bradycardia, intolerance and/or allergy.   Anti-anginal medications: Not indicated at this time.  Cholesterol Reduction Therapy: Continue Atorvastatin (Lipitor) 20 mg daily.    Low risk stress test and largely unremarkable echocardiogram.     History of non sustained ventricular tachycardia:   Continue Telemetry  Continue Amiodarone 200 mg daily    Once again, thank you for allowing me to participate in this patients care. Please do not hesitate to contact me if I could be of any further assistance.     Sincerely,  Alvino Paul MD, MS, F.A.C.C.  [facility] Cardiology Specialist    [address] [place], [place] [address]  Phone: [phone], Fax: [phone]     I believe that the risk of significant morbidity and mortality 
handrails per nursing staff  Abdominal:      General: There is no distension.      Palpations: Abdomen is soft.      Tenderness: There is no abdominal tenderness.   Musculoskeletal:         General: No swelling.      Cervical back: Normal range of motion.      Right lower leg: No edema.      Left lower leg: No edema.   Skin:     General: Skin is warm and dry.   Neurological:      Mental Status: He is alert.      Comments: Awake and alert but clearly confused   Psychiatric:      Comments: Not agitated          /76   Pulse 87   Temp 99.7 °F (37.6 °C) (Temporal)   Resp 19   Ht 1.702 m (5' 7\")   Wt 74.9 kg (165 lb 3.2 oz)   SpO2 94%   BMI 25.87 kg/m²   Labs, Radiology and Tests :  CBC:   Recent Labs     01/08/24  0645 01/10/24  0835   WBC 6.6 4.2   HGB 12.3* 15.1   HCT 38.2* 47.9    369     CMP:  Recent Labs     01/08/24  0645 01/10/24  0835 01/10/24  1445   * 150* 152*   K 4.5 4.9 4.9   * 112* 118*   CO2 24 23 22   BUN 32* 43* 42*   CREATININE 2.2* 2.9* 3.1*   GLUCOSE 95 89 113*   CALCIUM 8.8 9.3 8.4*     Hepatic:   Recent Labs     01/08/24  0645 01/10/24  0835   LABALBU 3.1* 3.3*   AST 45* 66*   ALT 44* 69*   BILITOT 0.3 0.5   ALKPHOS 140* 138*       Radiology : Chest x-ray  Coarse reticulation involving bilateral lungs which may represent  interstitial lung disease or atypical pneumonia, similar to the December 27th  exam.  Left lung base scarring versus recurrent atelectasis.    Old lab data have been reviewed and noted patient's baseline creatinine is around 2-2.4    Other / Echocardiogram: EF 55-60%    Assessment and Plan:  Renal -acute kidney injury-most likely secondary to prerenal etiology patient clinically looks dry also hyper natremia as well associated with some hypotension on presentation  Currently making some urine agree with IV fluids  Obtain a renal ultrasound scan.  Electrolytes -hypernatremia worsen continue hypotonic fluids monitor sodium in the morning, increase D5W 
annually.  Recommendations given regarding pneumococcal vaccinations.  Recommend COVID-19 vaccination  continue smoking cessation.  All the questions that the patient has had were answered to   his satisfaction.  Supplemental oxygen was  to be continued  Chest x-ray was reviewed.  After reviewing the patient's smoking history and his age patient does not meet the criteria for lung cancer screening.  ID consultation has been obtained  Patient is on Zosyn  Thank you for having us involved in the care of your patient. Please call us if you have any questions or concerns.      Arcenio Figueroa MD, M.D.            1/10/2024, 6:40 PM

## 2024-01-11 NOTE — PROCEDURES
Picc placement note:  Dynamic Access RN procedure    Consent signed and obtained by proceduralist, from Myles (son) patient/DPOA. See consent form in paper chart.    Prescribed IV Therapy = levophed, labs  Peripheral ultrasound assessment done. Plan for right basilic vein insertion.   CVR measurement = 30 % (Linear CVR is preferred to be less than 45%).  Product type: Bard 5 fr dual lumen Power PICC.  History/Labs/Allergies Reviewed  Placed By: HERON Jerry RN (Dynamic Access)  Time out Performed using Two Identifiers  Lot # [id number]  Expiration date = 06/30/2024  Trimmed at 38 cm total  External catheter length 0 cm  Number of attempts 1  Special equipment used- Bard 3cg tip confirmation system, ultrasound, and micro-introducer (MST) technique   Catheter securement = adhesive 3M securement device  Dressing applied= Tegaderm CHG  Lidocaine administered intradermally conc.1%, approx 1 ml (Lidocaine Lot# - [id number] and Exp date - 11/31/2025 )    X - RN aware picc placed with ECG technology and is confirmed in the distal 1/3 SVC. Picc is immediately released for use. Rn aware new iv tubing required.       PICC education:     [ X ] Discussed with patient/Family or POA prior to procedure.  Risks and Benefits along with reason for procedure were discussed and teaching was reinforced with an education handout on line  insertion. CDC FAQ Catheter Associated Blood Stream Infections and [facility] [address] REV. 7/13 Nursing and Booklet left at bedside or in chart. Patient (Family or POA) acknowledged understanding of information taught and agreed to procedure.

## 2024-01-11 NOTE — CARE COORDINATION
Case Management Assessment  Initial Evaluation    Date/Time of Evaluation: 1/11/2024 10:05 AM  Assessment Completed by: Melissa Santos MSW LSW     If patient is discharged prior to next notation, then this note serves as note for discharge by case management.    Patient Name: Jonathan Iglesias                   YOB: 1934  Diagnosis: Other specified hypotension [I95.89]  Hypoxemia [R09.02]  Serum sodium elevated [E87.0]  Septicemia (HCC) [A41.9]  Renal dysfunction [N28.9]  Elevated troponin [R79.89]  Severe sepsis (HCC) [A41.9, R65.20]                   Date / Time: 1/10/2024  7:52 AM    Patient Admission Status: Inpatient   Readmission Risk (Low < 19, Mod (19-27), High > 27): Readmission Risk Score: 19.7    Current PCP: Jenn Jensen APRN - CNP  PCP verified by CM? Yes    Chart Reviewed: Yes      History Provided by: Child/Family  Patient Orientation: Unable to Assess (Pt not oriented, discussed with son.)    Patient Cognition: Other (see comment) (Pt not doing well.)    Hospitalization in the last 30 days (Readmission):  Yes    If yes, Readmission Assessment in  Navigator will be completed.    Advance Directives:      Code Status: DNR-CCA   Patient's Primary Decision Maker is: Legal Next of Kin    Primary Decision Maker: Myles Iglesias - Child - [phone]    Discharge Planning:    Patient lives with: Alone Type of Home: Apartment  Primary Care Giver: Self  Patient Support Systems include: Children   Current Financial resources: Medicare  Current community resources: Other (Comment) (Pt was currently staying at [facility] for rehab)  Current services prior to admission: Durable Medical Equipment, Skilled Nursing Facility, Meals On Wheels            Current DME: Walker            Type of Home Care services:  None    ADLS  Prior functional level: Assistance with the following:, Bathing, Dressing, Cooking, Housework, Shopping, Mobility  Current functional level: Assistance with the following:,

## 2024-01-12 ENCOUNTER — APPOINTMENT (OUTPATIENT)
Dept: ULTRASOUND IMAGING | Age: 89
DRG: 871 | End: 2024-01-12
Payer: MEDICARE

## 2024-01-12 LAB
ALBUMIN SERPL-MCNC: 2.3 G/DL (ref 3.5–5.2)
ALBUMIN/GLOB SERPL: 0.8 {RATIO} (ref 1–2.5)
ALP SERPL-CCNC: 90 U/L (ref 40–129)
ALT SERPL-CCNC: 39 U/L (ref 5–41)
ANION GAP SERPL CALCULATED.3IONS-SCNC: 9 MMOL/L (ref 9–17)
AST SERPL-CCNC: 44 U/L
BILIRUB SERPL-MCNC: 0.2 MG/DL (ref 0.3–1.2)
BUN SERPL-MCNC: 54 MG/DL (ref 8–23)
BUN/CREAT SERPL: 23 (ref 9–20)
CALCIUM SERPL-MCNC: 8.4 MG/DL (ref 8.6–10.4)
CAMPYLOBACTER DNA SPEC NAA+PROBE: NORMAL
CHLORIDE SERPL-SCNC: 113 MMOL/L (ref 98–107)
CO2 SERPL-SCNC: 20 MMOL/L (ref 20–31)
CREAT SERPL-MCNC: 2.4 MG/DL (ref 0.7–1.2)
CRP SERPL HS-MCNC: 174.2 MG/L (ref 0–5)
EKG ATRIAL RATE: 61 BPM
EKG P AXIS: 41 DEGREES
EKG P-R INTERVAL: 174 MS
EKG Q-T INTERVAL: 406 MS
EKG QRS DURATION: 98 MS
EKG QTC CALCULATION (BAZETT): 408 MS
EKG R AXIS: 9 DEGREES
EKG T AXIS: -77 DEGREES
EKG VENTRICULAR RATE: 61 BPM
ETEC ELTA+ESTB GENES STL QL NAA+PROBE: NORMAL
GFR SERPL CREATININE-BSD FRML MDRD: 25 ML/MIN/1.73M2
GLUCOSE SERPL-MCNC: 143 MG/DL (ref 70–99)
P SHIGELLOIDES DNA STL QL NAA+PROBE: NORMAL
POTASSIUM SERPL-SCNC: 4 MMOL/L (ref 3.7–5.3)
PROCALCITONIN SERPL-MCNC: 31.17 NG/ML
PROT SERPL-MCNC: 5.2 G/DL (ref 6.4–8.3)
SALMONELLA DNA SPEC QL NAA+PROBE: NORMAL
SHIGA TOXIN STX GENE SPEC NAA+PROBE: NORMAL
SHIGELLA DNA SPEC QL NAA+PROBE: NORMAL
SODIUM SERPL-SCNC: 142 MMOL/L (ref 135–144)
SPECIMEN DESCRIPTION: NORMAL
TROPONIN I SERPL HS-MCNC: 78 NG/L (ref 0–22)
V CHOL+PARA RFBL+TRKH+TNAA STL QL NAA+PR: NORMAL
Y ENTERO RECN STL QL NAA+PROBE: NORMAL

## 2024-01-12 PROCEDURE — 99232 SBSQ HOSP IP/OBS MODERATE 35: CPT | Performed by: INTERNAL MEDICINE

## 2024-01-12 PROCEDURE — 93010 ELECTROCARDIOGRAM REPORT: CPT | Performed by: INTERNAL MEDICINE

## 2024-01-12 PROCEDURE — 2580000003 HC RX 258

## 2024-01-12 PROCEDURE — 6360000002 HC RX W HCPCS: Performed by: INTERNAL MEDICINE

## 2024-01-12 PROCEDURE — 6360000002 HC RX W HCPCS

## 2024-01-12 PROCEDURE — 94640 AIRWAY INHALATION TREATMENT: CPT

## 2024-01-12 PROCEDURE — 6370000000 HC RX 637 (ALT 250 FOR IP): Performed by: NURSE PRACTITIONER

## 2024-01-12 PROCEDURE — 2000000000 HC ICU R&B

## 2024-01-12 PROCEDURE — 93005 ELECTROCARDIOGRAM TRACING: CPT | Performed by: NURSE PRACTITIONER

## 2024-01-12 PROCEDURE — 84484 ASSAY OF TROPONIN QUANT: CPT

## 2024-01-12 PROCEDURE — 97162 PT EVAL MOD COMPLEX 30 MIN: CPT

## 2024-01-12 PROCEDURE — 97530 THERAPEUTIC ACTIVITIES: CPT

## 2024-01-12 PROCEDURE — 2580000003 HC RX 258: Performed by: NURSE PRACTITIONER

## 2024-01-12 PROCEDURE — 36592 COLLECT BLOOD FROM PICC: CPT

## 2024-01-12 PROCEDURE — 99233 SBSQ HOSP IP/OBS HIGH 50: CPT | Performed by: INTERNAL MEDICINE

## 2024-01-12 PROCEDURE — 76770 US EXAM ABDO BACK WALL COMP: CPT

## 2024-01-12 PROCEDURE — 94664 DEMO&/EVAL PT USE INHALER: CPT

## 2024-01-12 PROCEDURE — 97110 THERAPEUTIC EXERCISES: CPT

## 2024-01-12 PROCEDURE — 94761 N-INVAS EAR/PLS OXIMETRY MLT: CPT

## 2024-01-12 PROCEDURE — 80053 COMPREHEN METABOLIC PANEL: CPT

## 2024-01-12 PROCEDURE — 36415 COLL VENOUS BLD VENIPUNCTURE: CPT

## 2024-01-12 PROCEDURE — 2580000003 HC RX 258: Performed by: INTERNAL MEDICINE

## 2024-01-12 PROCEDURE — 94669 MECHANICAL CHEST WALL OSCILL: CPT

## 2024-01-12 PROCEDURE — 97166 OT EVAL MOD COMPLEX 45 MIN: CPT

## 2024-01-12 PROCEDURE — 86140 C-REACTIVE PROTEIN: CPT

## 2024-01-12 PROCEDURE — 84145 PROCALCITONIN (PCT): CPT

## 2024-01-12 RX ORDER — MIDODRINE HYDROCHLORIDE 5 MG/1
5 TABLET ORAL
Status: DISCONTINUED | OUTPATIENT
Start: 2024-01-12 | End: 2024-01-17

## 2024-01-12 RX ADMIN — MIDODRINE HYDROCHLORIDE 5 MG: 5 TABLET ORAL at 17:17

## 2024-01-12 RX ADMIN — IPRATROPIUM BROMIDE AND ALBUTEROL SULFATE 1 DOSE: .5; 3 SOLUTION RESPIRATORY (INHALATION) at 05:29

## 2024-01-12 RX ADMIN — SODIUM CHLORIDE: 4.5 INJECTION, SOLUTION INTRAVENOUS at 05:56

## 2024-01-12 RX ADMIN — DOPAMINE HYDROCHLORIDE 4 MCG/KG/MIN: 160 INJECTION, SOLUTION INTRAVENOUS at 14:53

## 2024-01-12 RX ADMIN — MIDODRINE HYDROCHLORIDE 5 MG: 5 TABLET ORAL at 07:09

## 2024-01-12 RX ADMIN — ATORVASTATIN CALCIUM 20 MG: 20 TABLET, FILM COATED ORAL at 09:13

## 2024-01-12 RX ADMIN — TAMSULOSIN HYDROCHLORIDE 0.4 MG: 0.4 CAPSULE ORAL at 09:13

## 2024-01-12 RX ADMIN — LEVOTHYROXINE SODIUM 88 MCG: 0.09 TABLET ORAL at 07:09

## 2024-01-12 RX ADMIN — REMDESIVIR 100 MG: 100 INJECTION, POWDER, LYOPHILIZED, FOR SOLUTION INTRAVENOUS at 21:39

## 2024-01-12 RX ADMIN — MIDODRINE HYDROCHLORIDE 5 MG: 5 TABLET ORAL at 12:09

## 2024-01-12 RX ADMIN — SODIUM CHLORIDE: 4.5 INJECTION, SOLUTION INTRAVENOUS at 22:23

## 2024-01-12 RX ADMIN — ASPIRIN 81 MG: 81 TABLET, COATED ORAL at 09:13

## 2024-01-12 RX ADMIN — IPRATROPIUM BROMIDE AND ALBUTEROL SULFATE 1 DOSE: .5; 3 SOLUTION RESPIRATORY (INHALATION) at 20:19

## 2024-01-12 RX ADMIN — SODIUM CHLORIDE, PRESERVATIVE FREE 10 ML: 5 INJECTION INTRAVENOUS at 09:14

## 2024-01-12 RX ADMIN — DEXAMETHASONE 6 MG: 4 TABLET ORAL at 09:14

## 2024-01-12 RX ADMIN — THERA TABS 1 TABLET: TAB at 09:13

## 2024-01-12 RX ADMIN — GUAIFENESIN, DEXTROMETHORPHAN HBR 1 TABLET: 600; 30 TABLET ORAL at 21:30

## 2024-01-12 RX ADMIN — SODIUM CHLORIDE, PRESERVATIVE FREE 10 ML: 5 INJECTION INTRAVENOUS at 21:33

## 2024-01-12 RX ADMIN — IPRATROPIUM BROMIDE AND ALBUTEROL SULFATE 1 DOSE: .5; 3 SOLUTION RESPIRATORY (INHALATION) at 15:37

## 2024-01-12 RX ADMIN — FINASTERIDE 5 MG: 5 TABLET, FILM COATED ORAL at 09:14

## 2024-01-12 RX ADMIN — BUPROPION HYDROCHLORIDE 100 MG: 100 TABLET, FILM COATED, EXTENDED RELEASE ORAL at 21:31

## 2024-01-12 RX ADMIN — OXYCODONE HYDROCHLORIDE AND ACETAMINOPHEN 1000 MG: 500 TABLET ORAL at 09:13

## 2024-01-12 RX ADMIN — OXYCODONE HYDROCHLORIDE AND ACETAMINOPHEN 1000 MG: 500 TABLET ORAL at 21:30

## 2024-01-12 RX ADMIN — GUAIFENESIN, DEXTROMETHORPHAN HBR 1 TABLET: 600; 30 TABLET ORAL at 09:14

## 2024-01-12 RX ADMIN — BUPROPION HYDROCHLORIDE 100 MG: 100 TABLET, FILM COATED, EXTENDED RELEASE ORAL at 09:13

## 2024-01-12 RX ADMIN — IPRATROPIUM BROMIDE AND ALBUTEROL SULFATE 1 DOSE: .5; 3 SOLUTION RESPIRATORY (INHALATION) at 10:00

## 2024-01-12 RX ADMIN — ENOXAPARIN SODIUM 70 MG: 100 INJECTION SUBCUTANEOUS at 09:14

## 2024-01-12 RX ADMIN — CEFEPIME 2000 MG: 2 INJECTION, POWDER, FOR SOLUTION INTRAVENOUS at 09:15

## 2024-01-12 ASSESSMENT — PAIN SCALES - GENERAL: PAINLEVEL_OUTOF10: 0

## 2024-01-12 NOTE — CARE COORDINATION
Readmission Assessment  Number of Days since last admission?: 8-30 days (Pt returned to ER from [facility] /SNF)  Previous Disposition: SNF  Who is being Interviewed:  (Pt unable to answer-  Info obtained from chart)  What was the patient's/caregiver's perception as to why they think they needed to return back to the hospital?: Other (Comment) (at SNF had low SPO2 and low BP)  Did you visit your Primary Care Physician after you left the hospital, before you returned this time?: No  Why weren't you able to visit your PCP?: Other (Comment) (Seen by the facility provider)  Did you see a specialist, such as Cardiac, Pulmonary, Orthopedic Physician, etc. after you left the hospital?: No  Who advised the patient to return to the hospital?: Skilled Unit  Does the patient report anything that got in the way of taking their medications?: No (given by facility)  In our efforts to provide the best possible care to you and others like you, can you think of anything that we could have done to help you after you left the hospital the first time, so that you might not have needed to return so soon?: Other (Comment) (returned from SNF to the hospital)

## 2024-01-12 NOTE — DISCHARGE INSTR - COC
Continuity of Care Form    Patient Name: Jonathan Iglesias   :  1934  MRN:  [id number]    Admit date:  1/10/2024  Discharge date:  ***    Code Status Order: DNR-CCA   Advance Directives:     Admitting Physician:  Jony Dunn MD  PCP: Jenn Jensen, APRN - CNP    Discharging Nurse: ***  Discharging Hospital Unit/Room#: 0325/0325-01  Discharging Unit Phone Number: ***    Emergency Contact:   Extended Emergency Contact Information  Primary Emergency Contact: Myles Iglesias   [facility]  Home Phone: [phone]  Mobile Phone: [phone]  Relation: Child  Hearing or visual needs: None  Other needs: None  Preferred language: English   needed? No  Secondary Emergency Contact: Preston Iglesias  Mobile Phone: [phone]  Relation: Child    Past Surgical History:  Past Surgical History:   Procedure Laterality Date    CORONARY ARTERY BYPASS GRAFT  08/21/2015     X3 W/ LIMA TO LAD    DIAGNOSTIC CARDIAC CATH LAB PROCEDURE  08/17/15    OTHER SURGICAL HISTORY  2015    endovascular vein harvesting    OTHER SURGICAL HISTORY  2015     normothermic cardiopulmonary bypass w/ cardioplegic arrest of the heart    OTHER SURGICAL HISTORY      gallstones removed, Lap       Immunization History:   Immunization History   Administered Date(s) Administered    COVID-19, PFIZER PURPLE top, DILUTE for use, (age 12 y+), 30mcg/0.3mL 2021, 2021, 2021    Influenza Vaccine, unspecified formulation 10/14/2014, 2016    Influenza Virus Vaccine 10/14/2015    Influenza, FLUAD, (age 65 y+), Adjuvanted, 0.5mL 10/13/2021    Influenza, FLUZONE (age 65 y+), High Dose, 0.7mL 2022    Influenza, High Dose (Fluzone 65 yrs and older) 2016, 10/04/2017, 10/16/2018, 2021    Influenza, Triv, inactivated, subunit, adjuvanted, IM (Fluad 65 yrs and older) 2019    Pneumococcal, PCV-13, PREVNAR 13, (age 6w+), IM, 0.5mL 2016    Pneumococcal, PPSV23, PNEUMOVAX 23, (age

## 2024-01-13 LAB
ALBUMIN SERPL-MCNC: 2.3 G/DL (ref 3.5–5.2)
ALBUMIN/GLOB SERPL: 0.9 {RATIO} (ref 1–2.5)
ALP SERPL-CCNC: 86 U/L (ref 40–129)
ALT SERPL-CCNC: 40 U/L (ref 5–41)
ANION GAP SERPL CALCULATED.3IONS-SCNC: 12 MMOL/L (ref 9–17)
AST SERPL-CCNC: 49 U/L
BASOPHILS # BLD: 0 K/UL (ref 0–0.2)
BASOPHILS NFR BLD: 0 % (ref 0–2)
BILIRUB SERPL-MCNC: 0.2 MG/DL (ref 0.3–1.2)
BUN SERPL-MCNC: 59 MG/DL (ref 8–23)
BUN/CREAT SERPL: 26 (ref 9–20)
CALCIUM SERPL-MCNC: 8.4 MG/DL (ref 8.6–10.4)
CHLORIDE SERPL-SCNC: 114 MMOL/L (ref 98–107)
CO2 SERPL-SCNC: 19 MMOL/L (ref 20–31)
CREAT SERPL-MCNC: 2.3 MG/DL (ref 0.7–1.2)
CRP SERPL HS-MCNC: 100.3 MG/L (ref 0–5)
EOSINOPHIL # BLD: 0.09 K/UL (ref 0–0.44)
EOSINOPHILS RELATIVE PERCENT: 1 % (ref 1–4)
ERYTHROCYTE [DISTWIDTH] IN BLOOD BY AUTOMATED COUNT: 14.5 % (ref 11.8–14.4)
FERRITIN SERPL-MCNC: 791 NG/ML (ref 30–400)
GFR SERPL CREATININE-BSD FRML MDRD: 26 ML/MIN/1.73M2
GLUCOSE SERPL-MCNC: 140 MG/DL (ref 70–99)
HCT VFR BLD AUTO: 30.7 % (ref 40.7–50.3)
HGB BLD-MCNC: 9.9 G/DL (ref 13–17)
IMM GRANULOCYTES # BLD AUTO: 0.09 K/UL (ref 0–0.3)
IMM GRANULOCYTES NFR BLD: 1 %
LYMPHOCYTES NFR BLD: 0.45 K/UL (ref 1.1–3.7)
LYMPHOCYTES RELATIVE PERCENT: 5 % (ref 24–43)
MCH RBC QN AUTO: 33.2 PG (ref 25.2–33.5)
MCHC RBC AUTO-ENTMCNC: 32.2 G/DL (ref 28.4–34.8)
MCV RBC AUTO: 103 FL (ref 82.6–102.9)
MONOCYTES NFR BLD: 0.36 K/UL (ref 0.1–1.2)
MONOCYTES NFR BLD: 4 % (ref 3–12)
MORPHOLOGY: NORMAL
NEUTROPHILS NFR BLD: 89 % (ref 36–65)
NEUTS SEG NFR BLD: 8.01 K/UL (ref 1.5–8.1)
NRBC BLD-RTO: 0 PER 100 WBC
PLATELET # BLD AUTO: 236 K/UL (ref 138–453)
PMV BLD AUTO: 10.7 FL (ref 8.1–13.5)
POTASSIUM SERPL-SCNC: 3.8 MMOL/L (ref 3.7–5.3)
PROT SERPL-MCNC: 5 G/DL (ref 6.4–8.3)
RBC # BLD AUTO: 2.98 M/UL (ref 4.21–5.77)
SODIUM SERPL-SCNC: 145 MMOL/L (ref 135–144)
WBC OTHER # BLD: 9 K/UL (ref 3.5–11.3)

## 2024-01-13 PROCEDURE — 94640 AIRWAY INHALATION TREATMENT: CPT

## 2024-01-13 PROCEDURE — 2580000003 HC RX 258: Performed by: NURSE PRACTITIONER

## 2024-01-13 PROCEDURE — 94761 N-INVAS EAR/PLS OXIMETRY MLT: CPT

## 2024-01-13 PROCEDURE — 6360000002 HC RX W HCPCS: Performed by: INTERNAL MEDICINE

## 2024-01-13 PROCEDURE — 2580000003 HC RX 258: Performed by: INTERNAL MEDICINE

## 2024-01-13 PROCEDURE — 97110 THERAPEUTIC EXERCISES: CPT

## 2024-01-13 PROCEDURE — 94669 MECHANICAL CHEST WALL OSCILL: CPT

## 2024-01-13 PROCEDURE — 99232 SBSQ HOSP IP/OBS MODERATE 35: CPT | Performed by: INTERNAL MEDICINE

## 2024-01-13 PROCEDURE — 6370000000 HC RX 637 (ALT 250 FOR IP): Performed by: NURSE PRACTITIONER

## 2024-01-13 PROCEDURE — 94664 DEMO&/EVAL PT USE INHALER: CPT

## 2024-01-13 PROCEDURE — 99233 SBSQ HOSP IP/OBS HIGH 50: CPT | Performed by: INTERNAL MEDICINE

## 2024-01-13 PROCEDURE — 6370000000 HC RX 637 (ALT 250 FOR IP): Performed by: INTERNAL MEDICINE

## 2024-01-13 PROCEDURE — 82728 ASSAY OF FERRITIN: CPT

## 2024-01-13 PROCEDURE — 80053 COMPREHEN METABOLIC PANEL: CPT

## 2024-01-13 PROCEDURE — 36415 COLL VENOUS BLD VENIPUNCTURE: CPT

## 2024-01-13 PROCEDURE — 2580000003 HC RX 258

## 2024-01-13 PROCEDURE — 86140 C-REACTIVE PROTEIN: CPT

## 2024-01-13 PROCEDURE — 85025 COMPLETE CBC W/AUTO DIFF WBC: CPT

## 2024-01-13 PROCEDURE — 2700000000 HC OXYGEN THERAPY PER DAY

## 2024-01-13 PROCEDURE — 2000000000 HC ICU R&B

## 2024-01-13 RX ORDER — BACLOFEN 10 MG/1
5 TABLET ORAL 2 TIMES DAILY
Status: DISCONTINUED | OUTPATIENT
Start: 2024-01-13 | End: 2024-01-17

## 2024-01-13 RX ADMIN — DEXAMETHASONE 6 MG: 4 TABLET ORAL at 08:15

## 2024-01-13 RX ADMIN — IPRATROPIUM BROMIDE AND ALBUTEROL SULFATE 1 DOSE: .5; 3 SOLUTION RESPIRATORY (INHALATION) at 11:15

## 2024-01-13 RX ADMIN — ENOXAPARIN SODIUM 70 MG: 100 INJECTION SUBCUTANEOUS at 08:16

## 2024-01-13 RX ADMIN — SODIUM CHLORIDE, PRESERVATIVE FREE 10 ML: 5 INJECTION INTRAVENOUS at 20:40

## 2024-01-13 RX ADMIN — ASPIRIN 81 MG: 81 TABLET, COATED ORAL at 08:15

## 2024-01-13 RX ADMIN — IPRATROPIUM BROMIDE AND ALBUTEROL SULFATE 1 DOSE: .5; 3 SOLUTION RESPIRATORY (INHALATION) at 15:58

## 2024-01-13 RX ADMIN — IPRATROPIUM BROMIDE AND ALBUTEROL SULFATE 1 DOSE: .5; 3 SOLUTION RESPIRATORY (INHALATION) at 05:41

## 2024-01-13 RX ADMIN — GUAIFENESIN, DEXTROMETHORPHAN HBR 1 TABLET: 600; 30 TABLET ORAL at 08:16

## 2024-01-13 RX ADMIN — OXYCODONE HYDROCHLORIDE AND ACETAMINOPHEN 1000 MG: 500 TABLET ORAL at 08:15

## 2024-01-13 RX ADMIN — REMDESIVIR 100 MG: 100 INJECTION, POWDER, LYOPHILIZED, FOR SOLUTION INTRAVENOUS at 20:49

## 2024-01-13 RX ADMIN — LEVOTHYROXINE SODIUM 88 MCG: 0.09 TABLET ORAL at 08:16

## 2024-01-13 RX ADMIN — POLYETHYLENE GLYCOL 3350 17 G: 17 POWDER, FOR SOLUTION ORAL at 08:16

## 2024-01-13 RX ADMIN — ATORVASTATIN CALCIUM 20 MG: 20 TABLET, FILM COATED ORAL at 08:16

## 2024-01-13 RX ADMIN — GUAIFENESIN, DEXTROMETHORPHAN HBR 1 TABLET: 600; 30 TABLET ORAL at 20:39

## 2024-01-13 RX ADMIN — SODIUM CHLORIDE, PRESERVATIVE FREE 10 ML: 5 INJECTION INTRAVENOUS at 08:17

## 2024-01-13 RX ADMIN — MIDODRINE HYDROCHLORIDE 5 MG: 5 TABLET ORAL at 16:58

## 2024-01-13 RX ADMIN — MIDODRINE HYDROCHLORIDE 5 MG: 5 TABLET ORAL at 08:15

## 2024-01-13 RX ADMIN — OXYCODONE HYDROCHLORIDE AND ACETAMINOPHEN 1000 MG: 500 TABLET ORAL at 20:39

## 2024-01-13 RX ADMIN — TAMSULOSIN HYDROCHLORIDE 0.4 MG: 0.4 CAPSULE ORAL at 08:15

## 2024-01-13 RX ADMIN — THERA TABS 1 TABLET: TAB at 08:16

## 2024-01-13 RX ADMIN — BACLOFEN 5 MG: 10 TABLET ORAL at 14:48

## 2024-01-13 RX ADMIN — BUPROPION HYDROCHLORIDE 100 MG: 100 TABLET, FILM COATED, EXTENDED RELEASE ORAL at 20:39

## 2024-01-13 RX ADMIN — CEFEPIME 2000 MG: 2 INJECTION, POWDER, FOR SOLUTION INTRAVENOUS at 10:05

## 2024-01-13 RX ADMIN — BUPROPION HYDROCHLORIDE 100 MG: 100 TABLET, FILM COATED, EXTENDED RELEASE ORAL at 08:16

## 2024-01-13 RX ADMIN — IPRATROPIUM BROMIDE AND ALBUTEROL SULFATE 1 DOSE: .5; 3 SOLUTION RESPIRATORY (INHALATION) at 20:29

## 2024-01-13 RX ADMIN — MIDODRINE HYDROCHLORIDE 5 MG: 5 TABLET ORAL at 12:02

## 2024-01-13 RX ADMIN — FINASTERIDE 5 MG: 5 TABLET, FILM COATED ORAL at 08:15

## 2024-01-13 RX ADMIN — SODIUM CHLORIDE: 4.5 INJECTION, SOLUTION INTRAVENOUS at 17:55

## 2024-01-14 ENCOUNTER — APPOINTMENT (OUTPATIENT)
Dept: GENERAL RADIOLOGY | Age: 89
DRG: 871 | End: 2024-01-14
Attending: INTERNAL MEDICINE
Payer: MEDICARE

## 2024-01-14 LAB
ALBUMIN SERPL-MCNC: 2.3 G/DL (ref 3.5–5.2)
ALBUMIN/GLOB SERPL: 0.9 {RATIO} (ref 1–2.5)
ALP SERPL-CCNC: 84 U/L (ref 40–129)
ALT SERPL-CCNC: 42 U/L (ref 5–41)
ANION GAP SERPL CALCULATED.3IONS-SCNC: 11 MMOL/L (ref 9–17)
AST SERPL-CCNC: 52 U/L
BASOPHILS # BLD: 0 K/UL (ref 0–0.2)
BASOPHILS NFR BLD: 0 % (ref 0–2)
BILIRUB SERPL-MCNC: 0.2 MG/DL (ref 0.3–1.2)
BUN SERPL-MCNC: 62 MG/DL (ref 8–23)
BUN/CREAT SERPL: 30 (ref 9–20)
CALCIUM SERPL-MCNC: 8.5 MG/DL (ref 8.6–10.4)
CHLORIDE SERPL-SCNC: 111 MMOL/L (ref 98–107)
CO2 SERPL-SCNC: 19 MMOL/L (ref 20–31)
CREAT SERPL-MCNC: 2.1 MG/DL (ref 0.7–1.2)
CRP SERPL HS-MCNC: 60 MG/L (ref 0–5)
EOSINOPHIL # BLD: 0 K/UL (ref 0–0.44)
EOSINOPHILS RELATIVE PERCENT: 0 % (ref 1–4)
ERYTHROCYTE [DISTWIDTH] IN BLOOD BY AUTOMATED COUNT: 14.4 % (ref 11.8–14.4)
GFR SERPL CREATININE-BSD FRML MDRD: 30 ML/MIN/1.73M2
GLUCOSE SERPL-MCNC: 122 MG/DL (ref 70–99)
HCT VFR BLD AUTO: 31.4 % (ref 40.7–50.3)
HGB BLD-MCNC: 10.4 G/DL (ref 13–17)
IMM GRANULOCYTES # BLD AUTO: 0 K/UL (ref 0–0.3)
IMM GRANULOCYTES NFR BLD: 0 %
LYMPHOCYTES NFR BLD: 0.44 K/UL (ref 1.1–3.7)
LYMPHOCYTES RELATIVE PERCENT: 6 % (ref 24–43)
MCH RBC QN AUTO: 33.4 PG (ref 25.2–33.5)
MCHC RBC AUTO-ENTMCNC: 33.1 G/DL (ref 28.4–34.8)
MCV RBC AUTO: 101 FL (ref 82.6–102.9)
MONOCYTES NFR BLD: 0.59 K/UL (ref 0.1–1.2)
MONOCYTES NFR BLD: 8 % (ref 3–12)
MORPHOLOGY: NORMAL
NEUTROPHILS NFR BLD: 86 % (ref 36–65)
NEUTS SEG NFR BLD: 6.37 K/UL (ref 1.5–8.1)
NRBC BLD-RTO: 0 PER 100 WBC
PLATELET # BLD AUTO: 250 K/UL (ref 138–453)
PMV BLD AUTO: 10.9 FL (ref 8.1–13.5)
POTASSIUM SERPL-SCNC: 3.9 MMOL/L (ref 3.7–5.3)
PROT SERPL-MCNC: 5 G/DL (ref 6.4–8.3)
RBC # BLD AUTO: 3.11 M/UL (ref 4.21–5.77)
SODIUM SERPL-SCNC: 141 MMOL/L (ref 135–144)
WBC OTHER # BLD: 7.4 K/UL (ref 3.5–11.3)

## 2024-01-14 PROCEDURE — 94640 AIRWAY INHALATION TREATMENT: CPT

## 2024-01-14 PROCEDURE — 36415 COLL VENOUS BLD VENIPUNCTURE: CPT

## 2024-01-14 PROCEDURE — 6370000000 HC RX 637 (ALT 250 FOR IP): Performed by: NURSE PRACTITIONER

## 2024-01-14 PROCEDURE — 6360000002 HC RX W HCPCS: Performed by: INTERNAL MEDICINE

## 2024-01-14 PROCEDURE — 6370000000 HC RX 637 (ALT 250 FOR IP): Performed by: INTERNAL MEDICINE

## 2024-01-14 PROCEDURE — 94669 MECHANICAL CHEST WALL OSCILL: CPT

## 2024-01-14 PROCEDURE — 99233 SBSQ HOSP IP/OBS HIGH 50: CPT | Performed by: INTERNAL MEDICINE

## 2024-01-14 PROCEDURE — 97110 THERAPEUTIC EXERCISES: CPT

## 2024-01-14 PROCEDURE — 94761 N-INVAS EAR/PLS OXIMETRY MLT: CPT

## 2024-01-14 PROCEDURE — 2580000003 HC RX 258

## 2024-01-14 PROCEDURE — 92610 EVALUATE SWALLOWING FUNCTION: CPT

## 2024-01-14 PROCEDURE — 2580000003 HC RX 258: Performed by: NURSE PRACTITIONER

## 2024-01-14 PROCEDURE — 85025 COMPLETE CBC W/AUTO DIFF WBC: CPT

## 2024-01-14 PROCEDURE — 2000000000 HC ICU R&B

## 2024-01-14 PROCEDURE — 99232 SBSQ HOSP IP/OBS MODERATE 35: CPT | Performed by: INTERNAL MEDICINE

## 2024-01-14 PROCEDURE — 71045 X-RAY EXAM CHEST 1 VIEW: CPT

## 2024-01-14 PROCEDURE — 80053 COMPREHEN METABOLIC PANEL: CPT

## 2024-01-14 PROCEDURE — 2580000003 HC RX 258: Performed by: INTERNAL MEDICINE

## 2024-01-14 PROCEDURE — 94664 DEMO&/EVAL PT USE INHALER: CPT

## 2024-01-14 PROCEDURE — 86140 C-REACTIVE PROTEIN: CPT

## 2024-01-14 RX ORDER — HALOPERIDOL 2 MG/1
2 TABLET ORAL ONCE
Status: COMPLETED | OUTPATIENT
Start: 2024-01-14 | End: 2024-01-14

## 2024-01-14 RX ORDER — HALOPERIDOL 0.5 MG/1
1 TABLET ORAL 3 TIMES DAILY
Status: DISCONTINUED | OUTPATIENT
Start: 2024-01-14 | End: 2024-01-17

## 2024-01-14 RX ORDER — CHLORPROMAZINE HYDROCHLORIDE 25 MG/1
25 TABLET, FILM COATED ORAL 3 TIMES DAILY PRN
Status: DISCONTINUED | OUTPATIENT
Start: 2024-01-14 | End: 2024-01-14 | Stop reason: ALTCHOICE

## 2024-01-14 RX ADMIN — DEXAMETHASONE 6 MG: 4 TABLET ORAL at 07:56

## 2024-01-14 RX ADMIN — MIDODRINE HYDROCHLORIDE 5 MG: 5 TABLET ORAL at 12:02

## 2024-01-14 RX ADMIN — TAMSULOSIN HYDROCHLORIDE 0.4 MG: 0.4 CAPSULE ORAL at 07:56

## 2024-01-14 RX ADMIN — IPRATROPIUM BROMIDE AND ALBUTEROL SULFATE 1 DOSE: .5; 3 SOLUTION RESPIRATORY (INHALATION) at 10:33

## 2024-01-14 RX ADMIN — LEVOTHYROXINE SODIUM 88 MCG: 0.09 TABLET ORAL at 07:56

## 2024-01-14 RX ADMIN — MIDODRINE HYDROCHLORIDE 5 MG: 5 TABLET ORAL at 17:21

## 2024-01-14 RX ADMIN — BUPROPION HYDROCHLORIDE 100 MG: 100 TABLET, FILM COATED, EXTENDED RELEASE ORAL at 07:56

## 2024-01-14 RX ADMIN — ASPIRIN 81 MG: 81 TABLET, COATED ORAL at 07:56

## 2024-01-14 RX ADMIN — IPRATROPIUM BROMIDE AND ALBUTEROL SULFATE 1 DOSE: .5; 3 SOLUTION RESPIRATORY (INHALATION) at 04:34

## 2024-01-14 RX ADMIN — ENOXAPARIN SODIUM 70 MG: 100 INJECTION SUBCUTANEOUS at 07:56

## 2024-01-14 RX ADMIN — GUAIFENESIN, DEXTROMETHORPHAN HBR 1 TABLET: 600; 30 TABLET ORAL at 20:48

## 2024-01-14 RX ADMIN — ATORVASTATIN CALCIUM 20 MG: 20 TABLET, FILM COATED ORAL at 07:56

## 2024-01-14 RX ADMIN — OXYCODONE HYDROCHLORIDE AND ACETAMINOPHEN 1000 MG: 500 TABLET ORAL at 07:56

## 2024-01-14 RX ADMIN — BACLOFEN 5 MG: 10 TABLET ORAL at 07:56

## 2024-01-14 RX ADMIN — BACLOFEN 5 MG: 10 TABLET ORAL at 20:48

## 2024-01-14 RX ADMIN — SODIUM CHLORIDE, PRESERVATIVE FREE 10 ML: 5 INJECTION INTRAVENOUS at 07:56

## 2024-01-14 RX ADMIN — SODIUM CHLORIDE, PRESERVATIVE FREE 10 ML: 5 INJECTION INTRAVENOUS at 07:57

## 2024-01-14 RX ADMIN — GUAIFENESIN, DEXTROMETHORPHAN HBR 1 TABLET: 600; 30 TABLET ORAL at 13:05

## 2024-01-14 RX ADMIN — CEFEPIME 2000 MG: 2 INJECTION, POWDER, FOR SOLUTION INTRAVENOUS at 09:46

## 2024-01-14 RX ADMIN — IPRATROPIUM BROMIDE AND ALBUTEROL SULFATE 1 DOSE: .5; 3 SOLUTION RESPIRATORY (INHALATION) at 15:49

## 2024-01-14 RX ADMIN — POLYETHYLENE GLYCOL 3350 17 G: 17 POWDER, FOR SOLUTION ORAL at 07:56

## 2024-01-14 RX ADMIN — REMDESIVIR 100 MG: 100 INJECTION, POWDER, LYOPHILIZED, FOR SOLUTION INTRAVENOUS at 20:51

## 2024-01-14 RX ADMIN — HALOPERIDOL 2 MG: 2 TABLET ORAL at 13:05

## 2024-01-14 RX ADMIN — SODIUM CHLORIDE, PRESERVATIVE FREE 10 ML: 5 INJECTION INTRAVENOUS at 20:49

## 2024-01-14 RX ADMIN — MIDODRINE HYDROCHLORIDE 5 MG: 5 TABLET ORAL at 07:56

## 2024-01-14 RX ADMIN — THERA TABS 1 TABLET: TAB at 07:56

## 2024-01-14 RX ADMIN — HALOPERIDOL 1 MG: 0.5 TABLET ORAL at 20:48

## 2024-01-14 RX ADMIN — IPRATROPIUM BROMIDE AND ALBUTEROL SULFATE 1 DOSE: .5; 3 SOLUTION RESPIRATORY (INHALATION) at 20:20

## 2024-01-14 RX ADMIN — FINASTERIDE 5 MG: 5 TABLET, FILM COATED ORAL at 07:56

## 2024-01-14 RX ADMIN — BUPROPION HYDROCHLORIDE 100 MG: 100 TABLET, FILM COATED, EXTENDED RELEASE ORAL at 20:48

## 2024-01-14 RX ADMIN — OXYCODONE HYDROCHLORIDE AND ACETAMINOPHEN 1000 MG: 500 TABLET ORAL at 20:48

## 2024-01-14 ASSESSMENT — PAIN SCALES - GENERAL
PAINLEVEL_OUTOF10: 0
PAINLEVEL_OUTOF10: 0

## 2024-01-15 ENCOUNTER — APPOINTMENT (OUTPATIENT)
Dept: GENERAL RADIOLOGY | Age: 89
DRG: 871 | End: 2024-01-15
Payer: MEDICARE

## 2024-01-15 LAB
ALBUMIN SERPL-MCNC: 2.5 G/DL (ref 3.5–5.2)
ALBUMIN/GLOB SERPL: 1 {RATIO} (ref 1–2.5)
ALP SERPL-CCNC: 84 U/L (ref 40–129)
ALT SERPL-CCNC: 46 U/L (ref 5–41)
ANION GAP SERPL CALCULATED.3IONS-SCNC: 9 MMOL/L (ref 9–17)
AST SERPL-CCNC: 45 U/L
BASOPHILS # BLD: 0 K/UL (ref 0–0.2)
BASOPHILS NFR BLD: 0 % (ref 0–2)
BILIRUB SERPL-MCNC: 0.3 MG/DL (ref 0.3–1.2)
BUN SERPL-MCNC: 65 MG/DL (ref 8–23)
BUN/CREAT SERPL: 33 (ref 9–20)
CALCIUM SERPL-MCNC: 8.7 MG/DL (ref 8.6–10.4)
CHLORIDE SERPL-SCNC: 115 MMOL/L (ref 98–107)
CO2 SERPL-SCNC: 20 MMOL/L (ref 20–31)
CREAT SERPL-MCNC: 2 MG/DL (ref 0.7–1.2)
EOSINOPHIL # BLD: 0 K/UL (ref 0–0.44)
EOSINOPHILS RELATIVE PERCENT: 0 % (ref 1–4)
ERYTHROCYTE [DISTWIDTH] IN BLOOD BY AUTOMATED COUNT: 14.2 % (ref 11.8–14.4)
GFR SERPL CREATININE-BSD FRML MDRD: 31 ML/MIN/1.73M2
GLUCOSE SERPL-MCNC: 140 MG/DL (ref 70–99)
HCT VFR BLD AUTO: 33.1 % (ref 40.7–50.3)
HGB BLD-MCNC: 10.7 G/DL (ref 13–17)
IMM GRANULOCYTES # BLD AUTO: 0 K/UL (ref 0–0.3)
IMM GRANULOCYTES NFR BLD: 0 %
LYMPHOCYTES NFR BLD: 0.45 K/UL (ref 1.1–3.7)
LYMPHOCYTES RELATIVE PERCENT: 8 % (ref 24–43)
MCH RBC QN AUTO: 32.3 PG (ref 25.2–33.5)
MCHC RBC AUTO-ENTMCNC: 32.3 G/DL (ref 28.4–34.8)
MCV RBC AUTO: 100 FL (ref 82.6–102.9)
MICROORGANISM SPEC CULT: NORMAL
MICROORGANISM SPEC CULT: NORMAL
MONOCYTES NFR BLD: 0.67 K/UL (ref 0.1–1.2)
MONOCYTES NFR BLD: 12 % (ref 3–12)
MORPHOLOGY: NORMAL
NEUTROPHILS NFR BLD: 80 % (ref 36–65)
NEUTS SEG NFR BLD: 4.48 K/UL (ref 1.5–8.1)
NRBC BLD-RTO: 0 PER 100 WBC
PLATELET # BLD AUTO: 261 K/UL (ref 138–453)
PMV BLD AUTO: 11.1 FL (ref 8.1–13.5)
POTASSIUM SERPL-SCNC: 4 MMOL/L (ref 3.7–5.3)
PROT SERPL-MCNC: 5 G/DL (ref 6.4–8.3)
RBC # BLD AUTO: 3.31 M/UL (ref 4.21–5.77)
SERVICE CMNT-IMP: NORMAL
SERVICE CMNT-IMP: NORMAL
SODIUM SERPL-SCNC: 144 MMOL/L (ref 135–144)
SPECIMEN DESCRIPTION: NORMAL
SPECIMEN DESCRIPTION: NORMAL
WBC OTHER # BLD: 5.6 K/UL (ref 3.5–11.3)

## 2024-01-15 PROCEDURE — 36592 COLLECT BLOOD FROM PICC: CPT

## 2024-01-15 PROCEDURE — 2500000003 HC RX 250 WO HCPCS: Performed by: NURSE PRACTITIONER

## 2024-01-15 PROCEDURE — 6370000000 HC RX 637 (ALT 250 FOR IP): Performed by: NURSE PRACTITIONER

## 2024-01-15 PROCEDURE — 6370000000 HC RX 637 (ALT 250 FOR IP): Performed by: INTERNAL MEDICINE

## 2024-01-15 PROCEDURE — 1200000000 HC SEMI PRIVATE

## 2024-01-15 PROCEDURE — 97110 THERAPEUTIC EXERCISES: CPT

## 2024-01-15 PROCEDURE — 94761 N-INVAS EAR/PLS OXIMETRY MLT: CPT

## 2024-01-15 PROCEDURE — 2580000003 HC RX 258

## 2024-01-15 PROCEDURE — 80053 COMPREHEN METABOLIC PANEL: CPT

## 2024-01-15 PROCEDURE — 94664 DEMO&/EVAL PT USE INHALER: CPT

## 2024-01-15 PROCEDURE — 2580000003 HC RX 258: Performed by: INTERNAL MEDICINE

## 2024-01-15 PROCEDURE — 97116 GAIT TRAINING THERAPY: CPT

## 2024-01-15 PROCEDURE — 85025 COMPLETE CBC W/AUTO DIFF WBC: CPT

## 2024-01-15 PROCEDURE — 94669 MECHANICAL CHEST WALL OSCILL: CPT

## 2024-01-15 PROCEDURE — 99232 SBSQ HOSP IP/OBS MODERATE 35: CPT | Performed by: INTERNAL MEDICINE

## 2024-01-15 PROCEDURE — 92526 ORAL FUNCTION THERAPY: CPT

## 2024-01-15 PROCEDURE — 74230 X-RAY XM SWLNG FUNCJ C+: CPT

## 2024-01-15 PROCEDURE — 99233 SBSQ HOSP IP/OBS HIGH 50: CPT | Performed by: INTERNAL MEDICINE

## 2024-01-15 PROCEDURE — 94640 AIRWAY INHALATION TREATMENT: CPT

## 2024-01-15 PROCEDURE — 6360000002 HC RX W HCPCS: Performed by: INTERNAL MEDICINE

## 2024-01-15 PROCEDURE — 2580000003 HC RX 258: Performed by: NURSE PRACTITIONER

## 2024-01-15 RX ADMIN — GUAIFENESIN, DEXTROMETHORPHAN HBR 1 TABLET: 600; 30 TABLET ORAL at 20:13

## 2024-01-15 RX ADMIN — HALOPERIDOL 1 MG: 0.5 TABLET ORAL at 20:13

## 2024-01-15 RX ADMIN — IPRATROPIUM BROMIDE AND ALBUTEROL SULFATE 1 DOSE: .5; 3 SOLUTION RESPIRATORY (INHALATION) at 04:33

## 2024-01-15 RX ADMIN — DEXAMETHASONE 6 MG: 4 TABLET ORAL at 08:17

## 2024-01-15 RX ADMIN — IPRATROPIUM BROMIDE AND ALBUTEROL SULFATE 1 DOSE: .5; 3 SOLUTION RESPIRATORY (INHALATION) at 16:08

## 2024-01-15 RX ADMIN — SODIUM CHLORIDE, PRESERVATIVE FREE 10 ML: 5 INJECTION INTRAVENOUS at 20:15

## 2024-01-15 RX ADMIN — OXYCODONE HYDROCHLORIDE AND ACETAMINOPHEN 1000 MG: 500 TABLET ORAL at 08:17

## 2024-01-15 RX ADMIN — SODIUM CHLORIDE, PRESERVATIVE FREE 10 ML: 5 INJECTION INTRAVENOUS at 20:13

## 2024-01-15 RX ADMIN — BACLOFEN 5 MG: 10 TABLET ORAL at 08:17

## 2024-01-15 RX ADMIN — MIDODRINE HYDROCHLORIDE 5 MG: 5 TABLET ORAL at 11:22

## 2024-01-15 RX ADMIN — MIDODRINE HYDROCHLORIDE 5 MG: 5 TABLET ORAL at 08:18

## 2024-01-15 RX ADMIN — THERA TABS 1 TABLET: TAB at 08:18

## 2024-01-15 RX ADMIN — IPRATROPIUM BROMIDE AND ALBUTEROL SULFATE 1 DOSE: .5; 3 SOLUTION RESPIRATORY (INHALATION) at 10:58

## 2024-01-15 RX ADMIN — SODIUM CHLORIDE, PRESERVATIVE FREE 10 ML: 5 INJECTION INTRAVENOUS at 08:58

## 2024-01-15 RX ADMIN — HALOPERIDOL 1 MG: 0.5 TABLET ORAL at 14:22

## 2024-01-15 RX ADMIN — MIDODRINE HYDROCHLORIDE 5 MG: 5 TABLET ORAL at 16:45

## 2024-01-15 RX ADMIN — IPRATROPIUM BROMIDE AND ALBUTEROL SULFATE 1 DOSE: .5; 3 SOLUTION RESPIRATORY (INHALATION) at 20:40

## 2024-01-15 RX ADMIN — BARIUM SULFATE 355 ML: 0.6 SUSPENSION ORAL at 14:22

## 2024-01-15 RX ADMIN — BUPROPION HYDROCHLORIDE 100 MG: 100 TABLET, FILM COATED, EXTENDED RELEASE ORAL at 20:13

## 2024-01-15 RX ADMIN — HALOPERIDOL 1 MG: 0.5 TABLET ORAL at 08:17

## 2024-01-15 RX ADMIN — LEVOTHYROXINE SODIUM 88 MCG: 0.09 TABLET ORAL at 08:17

## 2024-01-15 RX ADMIN — OXYCODONE HYDROCHLORIDE AND ACETAMINOPHEN 1000 MG: 500 TABLET ORAL at 20:13

## 2024-01-15 RX ADMIN — ASPIRIN 81 MG: 81 TABLET, COATED ORAL at 08:17

## 2024-01-15 RX ADMIN — GUAIFENESIN, DEXTROMETHORPHAN HBR 1 TABLET: 600; 30 TABLET ORAL at 11:22

## 2024-01-15 RX ADMIN — FINASTERIDE 5 MG: 5 TABLET, FILM COATED ORAL at 08:17

## 2024-01-15 RX ADMIN — CEFEPIME 2000 MG: 2 INJECTION, POWDER, FOR SOLUTION INTRAVENOUS at 11:22

## 2024-01-15 RX ADMIN — ENOXAPARIN SODIUM 70 MG: 100 INJECTION SUBCUTANEOUS at 08:19

## 2024-01-15 RX ADMIN — BUPROPION HYDROCHLORIDE 100 MG: 100 TABLET, FILM COATED, EXTENDED RELEASE ORAL at 08:17

## 2024-01-15 RX ADMIN — BACLOFEN 5 MG: 10 TABLET ORAL at 20:13

## 2024-01-15 RX ADMIN — TAMSULOSIN HYDROCHLORIDE 0.4 MG: 0.4 CAPSULE ORAL at 08:17

## 2024-01-15 RX ADMIN — ATORVASTATIN CALCIUM 20 MG: 20 TABLET, FILM COATED ORAL at 08:18

## 2024-01-15 RX ADMIN — POLYETHYLENE GLYCOL 3350 17 G: 17 POWDER, FOR SOLUTION ORAL at 08:19

## 2024-01-15 ASSESSMENT — PAIN SCALES - GENERAL: PAINLEVEL_OUTOF10: 0

## 2024-01-15 NOTE — PROCEDURES
INSTRUMENTAL SWALLOW REPORT  MODIFIED BARIUM SWALLOW    NAME: Jonathan Iglesias   : 1934  MRN: [id number]       Date of Eval: 1/15/2024        Radiologist: Dr. Eric     Referring Diagnosis(es):      Past Medical History:  has a past medical history of Acute renal insufficiency, Acute respiratory failure with hypoxia (HCC), Anxiety, ASHD (arteriosclerotic heart disease), Bipolar disorder (HCC), COVID-19 virus infection - 2024, Essential hypertension, Hyperlipidemia, Hypothyroidism, Localized osteoarthrosis, lower leg, Panlobular emphysema (HCC), and Parkinson's disease.  Past Surgical History:  has a past surgical history that includes Coronary artery bypass graft (2015 ); other surgical history (2015); other surgical history (2015 ); other surgical history (); and Diagnostic Cardiac Cath Lab Procedure (08/17/15).       Type of Study: Initial MBS       Recent CXR/CT of Chest: 2024  IMPRESSION:  1. Right-sided PICC distal tip overlying the cavoatrial junction.  2. Coarse reticular opacities throughout both lungs, left greater than right.  Small bilateral effusions.  Follow-up is recommended to document resolution.    Patient Complaints/Reason for Referral:  Jonathan Iglesias was referred for a MBS to assess the efficiency of his/her swallow function, assess for aspiration, and to make recommendations regarding safe dietary consistencies, effective compensatory strategies, and safe eating environment.RN reports patient was coughing with most all of his breakfast this AM with exception of oatmeal. RN also stating patient had difficulty with mixed consistency foods (I.e. fruit in juice). Patient demonstrating varied levels of confusion since evaluation.      Onset of problem:   Patient was previously seen during last hospitalization in 2023 for dysphagia treatment and was on a diet of easy to chew solids with mildly thick liquids. Patient went to [facility]

## 2024-01-15 NOTE — RT PROTOCOL NOTE
RESPIRATORY ASSESSMENT PROTOCOL                                                                                              Patient Name: Jonathan Iglesias Room#: I305/I305-01 : 1934     Admitting diagnosis: Other specified hypotension [I95.89]  Hypoxemia [R09.02]  Serum sodium elevated [E87.0]  Septicemia (HCC) [A41.9]  Renal dysfunction [N28.9]  Elevated troponin [R79.89]  Severe sepsis (HCC) [A41.9, R65.20]       Medical History:   Past Medical History:   Diagnosis Date    Acute renal insufficiency 10/05/2015    Acute respiratory failure with hypoxia (HCC) 01/10/2024    Anxiety 10/14/2015    ASHD (arteriosclerotic heart disease) 10/14/2015    Bipolar disorder ([facility]) 1976    COVID-19 virus infection - 2024 01/10/2024    Essential hypertension 2016    Hyperlipidemia     Hypothyroidism     Localized osteoarthrosis, lower leg 10/14/2015    Panlobular emphysema ([facility]) 01/10/2024    Parkinson's disease     light,        PATIENT ASSESSMENT    LABORATORY DATA  Hematology:   Lab Results   Component Value Date/Time    WBC 7.4 2024 05:30 AM    RBC 3.11 2024 05:30 AM    RBC 5.02 2012 10:27 AM    HGB 10.4 2024 05:30 AM    HCT 31.4 2024 05:30 AM     2024 05:30 AM     2012 10:27 AM     Chemistry:    Lab Results   Component Value Date/Time    PHART 7.445 01/10/2024 12:45 PM    PUQ1PML 29.8 01/10/2024 12:45 PM    PO2ART 53.2 01/10/2024 12:45 PM    T7WMMWIJ 89.4 01/10/2024 12:45 PM    FEI4GHX 20.0 01/10/2024 12:45 PM    NBEA 2.7 01/10/2024 12:45 PM       VITALS  Pulse: 66   Respirations: 22  BP: (!) 106/52  SpO2: 91 % O2 Device: None (Room air)  Temp: 98.6 °F (37 °C)    SKIN COLOR  [x] Normal  [] Pale  [] Dusky  [] Cyanotic    RESPIRATORY PATTERN  [x] Normal  [] Dyspnea  [] Cheyne-Jonas  [] Kussmaul  [] Biots    AMBULATORY  [] Yes  [] No  [x] With Assistance      Patient Acuity 0 1 2 3 4 Score   Level of Consciousness (LOC) [x]  Alert & Oriented or Pt 
RESPIRATORY ASSESSMENT PROTOCOL                                                                                              Patient Name: Jonathan Iglesias Room#: I305/I305-01 : 1934     Admitting diagnosis: Other specified hypotension [I95.89]  Hypoxemia [R09.02]  Serum sodium elevated [E87.0]  Septicemia (HCC) [A41.9]  Renal dysfunction [N28.9]  Elevated troponin [R79.89]  Severe sepsis (HCC) [A41.9, R65.20]       Medical History:   Past Medical History:   Diagnosis Date    Acute renal insufficiency 10/05/2015    Acute respiratory failure with hypoxia (HCC) 01/10/2024    Anxiety 10/14/2015    ASHD (arteriosclerotic heart disease) 10/14/2015    Bipolar disorder ([facility]) 1976    COVID-19 virus infection - 2024 01/10/2024    Essential hypertension 2016    Hyperlipidemia     Hypothyroidism     Localized osteoarthrosis, lower leg 10/14/2015    Panlobular emphysema ([facility]) 01/10/2024    Parkinson's disease     light,        PATIENT ASSESSMENT    LABORATORY DATA  Hematology:   Lab Results   Component Value Date/Time    WBC 5.6 01/15/2024 05:30 AM    RBC 3.31 01/15/2024 05:30 AM    RBC 5.02 2012 10:27 AM    HGB 10.7 01/15/2024 05:30 AM    HCT 33.1 01/15/2024 05:30 AM     01/15/2024 05:30 AM     2012 10:27 AM     Chemistry:    Lab Results   Component Value Date/Time    PHART 7.445 01/10/2024 12:45 PM    JZF2YEK 29.8 01/10/2024 12:45 PM    PO2ART 53.2 01/10/2024 12:45 PM    D5GWKNDX 89.4 01/10/2024 12:45 PM    EAC3LCF 20.0 01/10/2024 12:45 PM    NBEA 2.7 01/10/2024 12:45 PM       VITALS  Pulse: 59   Respirations: 17  BP: (!) 101/34  SpO2: 97 % O2 Device: None (Room air)  Temp: 97 °F (36.1 °C)    SKIN COLOR  [x] Normal  [] Pale  [] Dusky  [] Cyanotic    RESPIRATORY PATTERN  [] Normal  [x] Dyspnea  [] Cheyne-Jonas  [] Kussmaul  [] Biots    AMBULATORY  [] Yes  [x] No  [] With Assistance      Patient Acuity 0 1 2 3 4 Score   Level of Consciousness (LOC) [x]  Alert & Oriented or Pt 
RESPIRATORY ASSESSMENT PROTOCOL                                                                                              Patient Name: Jonathan Iglesias Room#: I305/I305-01 : 1934     Admitting diagnosis: Other specified hypotension [I95.89]  Hypoxemia [R09.02]  Serum sodium elevated [E87.0]  Septicemia (HCC) [A41.9]  Renal dysfunction [N28.9]  Elevated troponin [R79.89]  Severe sepsis (HCC) [A41.9, R65.20]       Medical History:   Past Medical History:   Diagnosis Date    Acute renal insufficiency 10/05/2015    Acute respiratory failure with hypoxia (HCC) 01/10/2024    Anxiety 10/14/2015    ASHD (arteriosclerotic heart disease) 10/14/2015    Bipolar disorder ([facility]) 1976    COVID-19 virus infection - 2024 01/10/2024    Essential hypertension 2016    Hyperlipidemia     Hypothyroidism     Localized osteoarthrosis, lower leg 10/14/2015    Panlobular emphysema ([facility]) 01/10/2024    Parkinson's disease     light,        PATIENT ASSESSMENT    LABORATORY DATA  Hematology:   Lab Results   Component Value Date/Time    WBC 4.2 01/10/2024 08:35 AM    RBC 4.56 01/10/2024 08:35 AM    RBC 5.02 2012 10:27 AM    HGB 15.1 01/10/2024 08:35 AM    HCT 47.9 01/10/2024 08:35 AM     01/10/2024 08:35 AM     2012 10:27 AM     Chemistry:    Lab Results   Component Value Date/Time    PHART 7.445 01/10/2024 12:45 PM    VDT6JZP 29.8 01/10/2024 12:45 PM    PO2ART 53.2 01/10/2024 12:45 PM    C1QBAPZM 89.4 01/10/2024 12:45 PM    NHN7TPN 20.0 01/10/2024 12:45 PM    NBEA 2.7 01/10/2024 12:45 PM       VITALS  Pulse: 66   Respirations: 26  BP: (!) 103/40  SpO2: 99 % O2 Device: None (Room air)  Temp: 97.9 °F (36.6 °C)    SKIN COLOR  [x] Normal  [] Pale  [] Dusky  [] Cyanotic    RESPIRATORY PATTERN  [x] Normal  [] Dyspnea  [] Cheyne-Jonas  [] Kussmaul  [] Biots    AMBULATORY  [] Yes  [] No  [] With Assistance      Patient Acuity 0 1 2 3 4 Score   Level of Consciousness (LOC) [x]  Alert & Oriented or Pt 
RESPIRATORY ASSESSMENT PROTOCOL                                                                                              Patient Name: Jonathan Iglesias Room#: I305/I305-01 : 1934     Admitting diagnosis: Other specified hypotension [I95.89]  Hypoxemia [R09.02]  Serum sodium elevated [E87.0]  Septicemia (HCC) [A41.9]  Renal dysfunction [N28.9]  Elevated troponin [R79.89]  Severe sepsis (HCC) [A41.9, R65.20]       Medical History:   Past Medical History:   Diagnosis Date    Acute renal insufficiency 10/05/2015    Acute respiratory failure with hypoxia (HCC) 01/10/2024    Anxiety 10/14/2015    ASHD (arteriosclerotic heart disease) 10/14/2015    Bipolar disorder ([facility]) 1976    COVID-19 virus infection - 2024 01/10/2024    Essential hypertension 2016    Hyperlipidemia     Hypothyroidism     Localized osteoarthrosis, lower leg 10/14/2015    Panlobular emphysema ([facility]) 01/10/2024    Parkinson's disease     light,        PATIENT ASSESSMENT    LABORATORY DATA  Hematology:   Lab Results   Component Value Date/Time    WBC 9.0 2024 10:30 AM    RBC 2.98 2024 10:30 AM    RBC 5.02 2012 10:27 AM    HGB 9.9 2024 10:30 AM    HCT 30.7 2024 10:30 AM     2024 10:30 AM     2012 10:27 AM     Chemistry:    Lab Results   Component Value Date/Time    PHART 7.445 01/10/2024 12:45 PM    GRN6GWJ 29.8 01/10/2024 12:45 PM    PO2ART 53.2 01/10/2024 12:45 PM    L1WLKDMX 89.4 01/10/2024 12:45 PM    YLY7PMY 20.0 01/10/2024 12:45 PM    NBEA 2.7 01/10/2024 12:45 PM       VITALS  Pulse: 64   Respirations: 19  BP: (!) 98/42  SpO2: 96 % O2 Device: None (Room air)  Temp: 97.9 °F (36.6 °C)    SKIN COLOR  [x] Normal  [] Pale  [] Dusky  [] Cyanotic    RESPIRATORY PATTERN  [x] Normal  [] Dyspnea  [] Cheyne-Jonas  [] Kussmaul  [] Biots    AMBULATORY  [] Yes  [x] No  [] With Assistance      Patient Acuity 0 1 2 3 4 Score   Level of Consciousness (LOC) [x]  Alert & Oriented or Pt 
directions []  Confused & uncooper-ative []  Obtunded []  Comatose 0   Respiratory Rate  (RR) []  Reg. rate & pattern. 12 - 20 bpm  []  Increased RR. Greater than 20 bpm   [x]  SOB w/ exertion or RR greater than 24 bpm []  Access- ory muscle use at rest. Abn.  resp. []  SOB at rest.   2   Bilateral Breath Sounds (BBS) []  Clear [x]  Diminish-ed bases  []  Diminish-ed t/o, or rales   []  Sporadic, scattered wheezes or rhonchi []  Persistentwheezes and, or absent BBS 1   Cough []  Strong, effective, & non-prod. [x]  Effective & prod. Less than 25 ml (2 TBSP) over past 24 hrs []  Ineffective & non-prod to less than 25 ML over past 24 hrs []  Ineffective and, or greater than 25 ml sputum prod. past 24 hrs. []  Nonspon- taneous; Requires suctioning 1   Pulmonary History  (PULM HX) []  No smoking and no chronic pulmonary history []  Former smoker. Quit over 12 mos. ago []  Current smoker or quit w/ in 12 mos []  Pulm. History and, or 20 pk/yr smoking hx [x]  Admitted w/ acute pulm. dx and, or has been admitted w/ pulm. dx 2 or more times over past 12 mos 4   Surgical History this Admit  (SURG HX) [x]  No surgery []  General surgery []  Lower abdominal []  Thoracic or upper abdominal   []  Thoracic w/ pulm. disease 0   Chest X-Ray (CXR)/CT Scan []  Clear or not applicable []  Not available [x]  Atelectasis or pleural effusions []  Localized infiltrate or pulm. edema []  Con-solidated Infiltrates, bilateral, or in more than 1 lobe 2   TOTAL ACUITY: 10       CARE PLAN    If Acuity Level is 2, 3, or 4 in any of the following:    [] BILATERAL BREATH SOUNDS (BBS)     [x] PULMONARY HISTORY (PULM HX)  [] Respiratory Rate  (RR)    Goal: Improve respiratory functions in patients with airway disease and decrease WOB    [x] AEROSOL PROTOCOL    Total Acuity:   14-28  []  Secondary Assessment in 24 hrs Total Acuity:  9-13  [x]  Secondary Assessment in 24 hrs Total Acuity:  4-8  []  Secondary Assessment in 24 hrs Total

## 2024-01-16 LAB
ALBUMIN SERPL-MCNC: 2.4 G/DL (ref 3.5–5.2)
ALBUMIN/GLOB SERPL: 0.9 {RATIO} (ref 1–2.5)
ALP SERPL-CCNC: 86 U/L (ref 40–129)
ALT SERPL-CCNC: 47 U/L (ref 5–41)
ANION GAP SERPL CALCULATED.3IONS-SCNC: 10 MMOL/L (ref 9–17)
AST SERPL-CCNC: 36 U/L
BASOPHILS # BLD: 0 K/UL (ref 0–0.2)
BASOPHILS NFR BLD: 0 % (ref 0–2)
BILIRUB SERPL-MCNC: 0.3 MG/DL (ref 0.3–1.2)
BUN SERPL-MCNC: 71 MG/DL (ref 8–23)
BUN/CREAT SERPL: 28 (ref 9–20)
CALCIUM SERPL-MCNC: 8.5 MG/DL (ref 8.6–10.4)
CHLORIDE SERPL-SCNC: 115 MMOL/L (ref 98–107)
CO2 SERPL-SCNC: 21 MMOL/L (ref 20–31)
CREAT SERPL-MCNC: 2.5 MG/DL (ref 0.7–1.2)
EOSINOPHIL # BLD: 0 K/UL (ref 0–0.44)
EOSINOPHILS RELATIVE PERCENT: 0 % (ref 1–4)
ERYTHROCYTE [DISTWIDTH] IN BLOOD BY AUTOMATED COUNT: 14.6 % (ref 11.8–14.4)
GFR SERPL CREATININE-BSD FRML MDRD: 24 ML/MIN/1.73M2
GLUCOSE SERPL-MCNC: 127 MG/DL (ref 70–99)
HCT VFR BLD AUTO: 33.6 % (ref 40.7–50.3)
HGB BLD-MCNC: 11 G/DL (ref 13–17)
IMM GRANULOCYTES # BLD AUTO: 0.14 K/UL (ref 0–0.3)
IMM GRANULOCYTES NFR BLD: 2 %
LYMPHOCYTES NFR BLD: 0.35 K/UL (ref 1.1–3.7)
LYMPHOCYTES RELATIVE PERCENT: 5 % (ref 24–43)
MCH RBC QN AUTO: 32.8 PG (ref 25.2–33.5)
MCHC RBC AUTO-ENTMCNC: 32.7 G/DL (ref 28.4–34.8)
MCV RBC AUTO: 100.3 FL (ref 82.6–102.9)
MONOCYTES NFR BLD: 0.42 K/UL (ref 0.1–1.2)
MONOCYTES NFR BLD: 6 % (ref 3–12)
MORPHOLOGY: ABNORMAL
NEUTROPHILS NFR BLD: 87 % (ref 36–65)
NEUTS SEG NFR BLD: 6.15 K/UL (ref 1.5–8.1)
NRBC BLD-RTO: 0 PER 100 WBC
NUCLEATED RED BLOOD CELLS: 2 PER 100 WBC (ref 0–5)
PLATELET # BLD AUTO: 251 K/UL (ref 138–453)
PMV BLD AUTO: 11 FL (ref 8.1–13.5)
POTASSIUM SERPL-SCNC: 4.1 MMOL/L (ref 3.7–5.3)
PROT SERPL-MCNC: 5 G/DL (ref 6.4–8.3)
RBC # BLD AUTO: 3.35 M/UL (ref 4.21–5.77)
SODIUM SERPL-SCNC: 146 MMOL/L (ref 135–144)
WBC OTHER # BLD: 7.1 K/UL (ref 3.5–11.3)

## 2024-01-16 PROCEDURE — 97110 THERAPEUTIC EXERCISES: CPT

## 2024-01-16 PROCEDURE — 99232 SBSQ HOSP IP/OBS MODERATE 35: CPT | Performed by: INTERNAL MEDICINE

## 2024-01-16 PROCEDURE — 2580000003 HC RX 258

## 2024-01-16 PROCEDURE — 94669 MECHANICAL CHEST WALL OSCILL: CPT

## 2024-01-16 PROCEDURE — 94640 AIRWAY INHALATION TREATMENT: CPT

## 2024-01-16 PROCEDURE — 2580000003 HC RX 258: Performed by: NURSE PRACTITIONER

## 2024-01-16 PROCEDURE — 92526 ORAL FUNCTION THERAPY: CPT

## 2024-01-16 PROCEDURE — 2580000003 HC RX 258: Performed by: INTERNAL MEDICINE

## 2024-01-16 PROCEDURE — 97116 GAIT TRAINING THERAPY: CPT

## 2024-01-16 PROCEDURE — 94761 N-INVAS EAR/PLS OXIMETRY MLT: CPT

## 2024-01-16 PROCEDURE — 6370000000 HC RX 637 (ALT 250 FOR IP): Performed by: NURSE PRACTITIONER

## 2024-01-16 PROCEDURE — 6360000002 HC RX W HCPCS: Performed by: INTERNAL MEDICINE

## 2024-01-16 PROCEDURE — 2700000000 HC OXYGEN THERAPY PER DAY

## 2024-01-16 PROCEDURE — 6370000000 HC RX 637 (ALT 250 FOR IP): Performed by: INTERNAL MEDICINE

## 2024-01-16 PROCEDURE — 80053 COMPREHEN METABOLIC PANEL: CPT

## 2024-01-16 PROCEDURE — 85025 COMPLETE CBC W/AUTO DIFF WBC: CPT

## 2024-01-16 PROCEDURE — 1200000000 HC SEMI PRIVATE

## 2024-01-16 PROCEDURE — 94664 DEMO&/EVAL PT USE INHALER: CPT

## 2024-01-16 PROCEDURE — 36415 COLL VENOUS BLD VENIPUNCTURE: CPT

## 2024-01-16 RX ORDER — SODIUM CHLORIDE 450 MG/100ML
INJECTION, SOLUTION INTRAVENOUS CONTINUOUS
Status: DISCONTINUED | OUTPATIENT
Start: 2024-01-16 | End: 2024-01-17

## 2024-01-16 RX ADMIN — BACLOFEN 5 MG: 10 TABLET ORAL at 20:47

## 2024-01-16 RX ADMIN — POLYETHYLENE GLYCOL 3350 17 G: 17 POWDER, FOR SOLUTION ORAL at 07:59

## 2024-01-16 RX ADMIN — FINASTERIDE 5 MG: 5 TABLET, FILM COATED ORAL at 07:52

## 2024-01-16 RX ADMIN — HALOPERIDOL 1 MG: 0.5 TABLET ORAL at 20:48

## 2024-01-16 RX ADMIN — HALOPERIDOL 1 MG: 0.5 TABLET ORAL at 07:50

## 2024-01-16 RX ADMIN — SODIUM CHLORIDE, PRESERVATIVE FREE 20 ML: 5 INJECTION INTRAVENOUS at 07:48

## 2024-01-16 RX ADMIN — THERA TABS 1 TABLET: TAB at 07:50

## 2024-01-16 RX ADMIN — BUPROPION HYDROCHLORIDE 100 MG: 100 TABLET, FILM COATED, EXTENDED RELEASE ORAL at 07:51

## 2024-01-16 RX ADMIN — OXYCODONE HYDROCHLORIDE AND ACETAMINOPHEN 1000 MG: 500 TABLET ORAL at 07:51

## 2024-01-16 RX ADMIN — BUPROPION HYDROCHLORIDE 100 MG: 100 TABLET, FILM COATED, EXTENDED RELEASE ORAL at 20:47

## 2024-01-16 RX ADMIN — SODIUM CHLORIDE, PRESERVATIVE FREE 10 ML: 5 INJECTION INTRAVENOUS at 20:55

## 2024-01-16 RX ADMIN — SODIUM CHLORIDE: 4.5 INJECTION, SOLUTION INTRAVENOUS at 09:10

## 2024-01-16 RX ADMIN — CEFEPIME 2000 MG: 2 INJECTION, POWDER, FOR SOLUTION INTRAVENOUS at 10:29

## 2024-01-16 RX ADMIN — ATORVASTATIN CALCIUM 20 MG: 20 TABLET, FILM COATED ORAL at 07:50

## 2024-01-16 RX ADMIN — ENOXAPARIN SODIUM 70 MG: 100 INJECTION SUBCUTANEOUS at 09:11

## 2024-01-16 RX ADMIN — IPRATROPIUM BROMIDE AND ALBUTEROL SULFATE 1 DOSE: .5; 3 SOLUTION RESPIRATORY (INHALATION) at 10:25

## 2024-01-16 RX ADMIN — HALOPERIDOL 1 MG: 0.5 TABLET ORAL at 14:03

## 2024-01-16 RX ADMIN — MIDODRINE HYDROCHLORIDE 5 MG: 5 TABLET ORAL at 07:51

## 2024-01-16 RX ADMIN — OXYCODONE HYDROCHLORIDE AND ACETAMINOPHEN 1000 MG: 500 TABLET ORAL at 20:48

## 2024-01-16 RX ADMIN — IPRATROPIUM BROMIDE AND ALBUTEROL SULFATE 1 DOSE: .5; 3 SOLUTION RESPIRATORY (INHALATION) at 05:25

## 2024-01-16 RX ADMIN — TAMSULOSIN HYDROCHLORIDE 0.4 MG: 0.4 CAPSULE ORAL at 07:51

## 2024-01-16 RX ADMIN — LEVOTHYROXINE SODIUM 88 MCG: 0.09 TABLET ORAL at 07:50

## 2024-01-16 RX ADMIN — IPRATROPIUM BROMIDE AND ALBUTEROL SULFATE 1 DOSE: .5; 3 SOLUTION RESPIRATORY (INHALATION) at 20:28

## 2024-01-16 RX ADMIN — BACLOFEN 5 MG: 10 TABLET ORAL at 07:50

## 2024-01-16 RX ADMIN — MIDODRINE HYDROCHLORIDE 5 MG: 5 TABLET ORAL at 11:29

## 2024-01-16 RX ADMIN — ASPIRIN 81 MG: 81 TABLET, COATED ORAL at 07:50

## 2024-01-16 RX ADMIN — IPRATROPIUM BROMIDE AND ALBUTEROL SULFATE 1 DOSE: .5; 3 SOLUTION RESPIRATORY (INHALATION) at 16:08

## 2024-01-16 RX ADMIN — MIDODRINE HYDROCHLORIDE 5 MG: 5 TABLET ORAL at 16:23

## 2024-01-16 RX ADMIN — GUAIFENESIN, DEXTROMETHORPHAN HBR 1 TABLET: 600; 30 TABLET ORAL at 20:47

## 2024-01-16 RX ADMIN — SODIUM CHLORIDE, PRESERVATIVE FREE 10 ML: 5 INJECTION INTRAVENOUS at 07:44

## 2024-01-16 NOTE — PLAN OF CARE
Problem: Discharge Planning  Goal: Discharge to home or other facility with appropriate resources  1/13/2024 0928 by Blanca Daly RN  Outcome: Progressing     Problem: Safety - Adult  Goal: Free from fall injury  1/13/2024 0928 by Blanca Daly RN  Outcome: Progressing     Problem: ABCDS Injury Assessment  Goal: Absence of physical injury  1/13/2024 0928 by Blanca Daly RN  Outcome: Progressing     Problem: Skin/Tissue Integrity  Goal: Absence of new skin breakdown  Description: 1.  Monitor for areas of redness and/or skin breakdown  2.  Assess vascular access sites hourly  3.  Every 4-6 hours minimum:  Change oxygen saturation probe site  4.  Every 4-6 hours:  If on nasal continuous positive airway pressure, respiratory therapy assess nares and determine need for appliance change or resting period.  1/13/2024 0928 by Blanca Daly RN  Outcome: Progressing     Problem: Respiratory - Adult  Goal: Achieves optimal ventilation and oxygenation  1/13/2024 0928 by Blanca Daly RN  Outcome: Progressing     Problem: Skin/Tissue Integrity - Adult  Goal: Skin integrity remains intact  1/13/2024 0928 by Blanca Daly RN  Outcome: Progressing     Problem: Infection - Adult  Goal: Absence of infection at discharge  1/13/2024 0928 by Blanca Daly RN  Outcome: Progressing     Problem: Infection - Adult  Goal: Absence of infection during hospitalization  Outcome: Progressing     Problem: Metabolic/Fluid and Electrolytes - Adult  Goal: Electrolytes maintained within normal limits  1/13/2024 0928 by Blanca Daly RN  Outcome: Progressing     Problem: Metabolic/Fluid and Electrolytes - Adult  Goal: Hemodynamic stability and optimal renal function maintained  Outcome: Progressing     Problem: Nutrition Deficit:  Goal: Optimize nutritional status  1/13/2024 0928 by Blanca Daly RN  Outcome: Progressing     Problem: Gastrointestinal - Adult  Goal: Maintains adequate nutritional intake  1/13/2024 0928 by Malika 
  Problem: Discharge Planning  Goal: Discharge to home or other facility with appropriate resources  1/15/2024 2132 by Eunice Kelley RN  Outcome: Progressing     Problem: Safety - Adult  Goal: Free from fall injury  1/15/2024 2132 by Eunice Kelley RN  Outcome: Progressing  Flowsheets (Taken 1/15/2024 2132)  Free From Fall Injury: Instruct family/caregiver on patient safety     Problem: ABCDS Injury Assessment  Goal: Absence of physical injury  1/15/2024 2132 by Eunice Kelley RN  Outcome: Progressing     Problem: Skin/Tissue Integrity  Goal: Absence of new skin breakdown  Description: 1.  Monitor for areas of redness and/or skin breakdown  2.  Assess vascular access sites hourly  3.  Every 4-6 hours minimum:  Change oxygen saturation probe site  4.  Every 4-6 hours:  If on nasal continuous positive airway pressure, respiratory therapy assess nares and determine need for appliance change or resting period.  Outcome: Progressing     Problem: Respiratory - Adult  Goal: Achieves optimal ventilation and oxygenation  1/15/2024 2132 by Eunice Kelley RN  Outcome: Progressing  Flowsheets (Taken 1/15/2024 2132)  Achieves optimal ventilation and oxygenation: Assess for changes in respiratory status     Problem: Skin/Tissue Integrity - Adult  Goal: Skin integrity remains intact  1/15/2024 2132 by Eunice Kelley RN  Outcome: Progressing     Problem: Infection - Adult  Goal: Absence of infection at discharge  1/15/2024 2132 by Eunice Kelley RN  Outcome: Progressing     Problem: Metabolic/Fluid and Electrolytes - Adult  Goal: Electrolytes maintained within normal limits  1/15/2024 2132 by Eunice Kelley RN  Outcome: Progressing  Flowsheets (Taken 1/15/2024 2132)  Electrolytes maintained within normal limits: Monitor labs and assess patient for signs and symptoms of electrolyte imbalances     Problem: Metabolic/Fluid and Electrolytes - Adult  Goal: Hemodynamic stability and optimal renal function maintained  Outcome: 
  Problem: Discharge Planning  Goal: Discharge to home or other facility with appropriate resources  Outcome: Progressing     Problem: Safety - Adult  Goal: Free from fall injury  Outcome: Progressing  Flowsheets (Taken 1/15/2024 0727)  Free From Fall Injury: Instruct family/caregiver on patient safety     Problem: ABCDS Injury Assessment  Goal: Absence of physical injury  Outcome: Progressing  Flowsheets (Taken 1/15/2024 0727)  Absence of Physical Injury: Implement safety measures based on patient assessment     Problem: Respiratory - Adult  Goal: Achieves optimal ventilation and oxygenation  Outcome: Progressing     Problem: Skin/Tissue Integrity - Adult  Goal: Skin integrity remains intact  Outcome: Progressing     Problem: Infection - Adult  Goal: Absence of infection at discharge  Outcome: Progressing     Problem: Metabolic/Fluid and Electrolytes - Adult  Goal: Electrolytes maintained within normal limits  Outcome: Progressing     Problem: Gastrointestinal - Adult  Goal: Maintains adequate nutritional intake  Outcome: Progressing     Problem: Genitourinary - Adult  Goal: Urinary catheter remains patent  Outcome: Progressing     Problem: Cardiovascular - Adult  Goal: Maintains optimal cardiac output and hemodynamic stability  Outcome: Progressing     Problem: Nutrition Deficit:  Goal: Optimize nutritional status  Outcome: Progressing     
  Problem: Discharge Planning  Goal: Discharge to home or other facility with appropriate resources  Outcome: Progressing    Discharge plan is pending, but most likely patient will need rehab or extended care facility. LSW providing assistance for discharge needs.    Problem: Safety - Adult  Goal: Free from fall injury  1/12/2024 0730 by Maci Iqbal, RN  Outcome: Progressing   Patient's bed in lowest position. Bed/chair wheel locked. Call light within reach. Non-skid socks worn. Bedside table within reach. Bedrails up x3. Patient is up with assistance. Bed/chair alarm on.    Problem: Skin/Tissue Integrity  Goal: Absence of new skin breakdown  Description: 1.  Monitor for areas of redness and/or skin breakdown  2.  Assess vascular access sites every 2 hours  3.  Every 4-6 hours minimum:  Change oxygen saturation probe site  1/12/2024 0730 by Maci Iqbal, RN  Outcome: Progressing  Patient turn/repositioned every 2 hours. Patient encouraged to ambulate and use restroom every 2 hours. Patient assessed for wet bottoms as needed. Patient assessed for skin breakdown. Zinc paste used on skin as needed. Heels floated while in chair and bed. SpO2 probe changed and skin assessed underneath.    Problem: Respiratory - Adult  Goal: Achieves optimal ventilation and oxygenation  1/12/2024 0730 by Maci Iqbal, RN  Outcome: Progressing   Patient encouraged to cough and deep breathe. HOB elevated. Respiratory treatments given per orders. Nasal cannula oxygen used as needed for stable SpO2 reading.    Problem: Infection - Adult  Goal: Absence of infection during hospitalization  Outcome: Progressing   Monitor tubes, drains, and vascular access points for signs and symptoms of infection. Monitor lab values, like WBC, along with vital signs.    Problem: Metabolic/Fluid and Electrolytes - Adult  Goal: Electrolytes maintained within normal limits  Outcome: Progressing   Monitor pertinent lab values and intake and output.     Problem: 
  Problem: Discharge Planning  Goal: Discharge to home or other facility with appropriate resources  Outcome: Progressing  Flowsheets  Taken 1/13/2024 0000  Discharge to home or other facility with appropriate resources: Identify barriers to discharge with patient and caregiver  Taken 1/12/2024 1900  Discharge to home or other facility with appropriate resources: Identify barriers to discharge with patient and caregiver     Problem: Safety - Adult  Goal: Free from fall injury  Outcome: Progressing  Note: Call light and bedside table with in reach. Bed and chair wheels locked at all times, with bed in lowest position. Frequent checks and needs meet in appropriate timing.      Problem: ABCDS Injury Assessment  Goal: Absence of physical injury  Outcome: Progressing     Problem: Skin/Tissue Integrity  Goal: Absence of new skin breakdown  Description: 1.  Monitor for areas of redness and/or skin breakdown  2.  Assess vascular access sites hourly  3.  Every 4-6 hours minimum:  Change oxygen saturation probe site  4.  Every 4-6 hours:  If on nasal continuous positive airway pressure, respiratory therapy assess nares and determine need for appliance change or resting period.  Outcome: Progressing  Note: Joaquin scale monitoring. inspect skin for breakdown. Chart all shin breakdown. Encourage frequent repositioning. No new breakdown.     Problem: Respiratory - Adult  Goal: Achieves optimal ventilation and oxygenation  Outcome: Progressing     Problem: Skin/Tissue Integrity - Adult  Goal: Skin integrity remains intact  Outcome: Progressing     Problem: Infection - Adult  Goal: Absence of infection at discharge  Outcome: Progressing     Problem: Metabolic/Fluid and Electrolytes - Adult  Goal: Electrolytes maintained within normal limits  Outcome: Progressing     Problem: Gastrointestinal - Adult  Goal: Maintains adequate nutritional intake  Outcome: Progressing     Problem: Genitourinary - Adult  Goal: Urinary catheter remains 
  Problem: Discharge Planning  Goal: Discharge to home or other facility with appropriate resources  Outcome: Progressing  Flowsheets (Taken 1/13/2024 1815)  Discharge to home or other facility with appropriate resources: Identify barriers to discharge with patient and caregiver     Problem: ABCDS Injury Assessment  Goal: Absence of physical injury  Outcome: Progressing     Problem: Skin/Tissue Integrity  Goal: Absence of new skin breakdown  Description: 1.  Monitor for areas of redness and/or skin breakdown  2.  Assess vascular access sites hourly  3.  Every 4-6 hours minimum:  Change oxygen saturation probe site  4.  Every 4-6 hours:  If on nasal continuous positive airway pressure, respiratory therapy assess nares and determine need for appliance change or resting period.  Outcome: Progressing  Note: Joaquin scale monitoring. inspect skin for breakdown. Chart all shin breakdown. Encourage frequent repositioning. No new breakdown.     Problem: Respiratory - Adult  Goal: Achieves optimal ventilation and oxygenation  Outcome: Progressing     Problem: Skin/Tissue Integrity - Adult  Goal: Skin integrity remains intact  Outcome: Progressing     Problem: Infection - Adult  Goal: Absence of infection at discharge  Outcome: Progressing     Problem: Metabolic/Fluid and Electrolytes - Adult  Goal: Electrolytes maintained within normal limits  Outcome: Progressing     Problem: Gastrointestinal - Adult  Goal: Maintains adequate nutritional intake  Outcome: Progressing     Problem: Genitourinary - Adult  Goal: Urinary catheter remains patent  Outcome: Progressing     Problem: Cardiovascular - Adult  Goal: Maintains optimal cardiac output and hemodynamic stability  Outcome: Progressing     Problem: Nutrition Deficit:  Goal: Optimize nutritional status  Outcome: Progressing     
  Problem: Safety - Adult  Goal: Free from fall injury  1/11/2024 0014 by Margarita Huynh, RN  Outcome: Progressing  Flowsheets (Taken 1/11/2024 0012)  Free From Fall Injury: Instruct family/caregiver on patient safety  Note: Pt is at high risk for falls. Non skid socks on. Bed in low position and wheels locked. Fall sign posted and bracelet on. Siderails up x 3. Bed alarm on. Call light within reach. Will continue to assess.     Problem: Skin/Tissue Integrity  Goal: Absence of new skin breakdown  Description: 1.  Monitor for areas of redness and/or skin breakdown  2.  Assess vascular access sites hourly  3.  Every 4-6 hours minimum:  Change oxygen saturation probe site  4.  Every 4-6 hours:  If on nasal continuous positive airway pressure, respiratory therapy assess nares and determine need for appliance change or resting period.  1/11/2024 0014 by Margarita Huynh, RN  Outcome: Progressing  Note: Skin assessed at regular intervals. No open areas noted at this time. Pt repositioned every 2-3 hours. Zinc paste to groin & coccyx. Will continue to assess.     Problem: Respiratory - Adult  Goal: Achieves optimal ventilation and oxygenation  Outcome: Progressing  Flowsheets (Taken 1/11/2024 0012)  Achieves optimal ventilation and oxygenation:   Assess for changes in respiratory status   Position to facilitate oxygenation and minimize respiratory effort   Assess the need for suctioning and aspirate as needed   Respiratory therapy support as indicated   Assess for changes in mentation and behavior   Oxygen supplementation based on oxygen saturation or arterial blood gases   Encourage broncho-pulmonary hygiene including cough, deep breathe, incentive spirometry   Assess and instruct to report shortness of breath or any respiratory difficulty  Note: Pulse ox WNL. HHFNC in use. SOB noted with exertion. Pt denies any discomfort. Will continue to assess.     Problem: Infection - Adult  Goal: Absence of infection during 
  Problem: Safety - Adult  Goal: Free from fall injury  1/11/2024 1926 by Margarita Huynh, RN  Outcome: Progressing  Flowsheets (Taken 1/11/2024 1926)  Free From Fall Injury: Instruct family/caregiver on patient safety  Note: Pt is at high risk for falls. Non skid socks on. Bed in low position and wheels locked. Fall sign posted and bracelet on. Siderails up x 3. Bed alarm on. Call light within reach. Will continue to assess.     Problem: Skin/Tissue Integrity  Goal: Absence of new skin breakdown  Description: 1.  Monitor for areas of redness and/or skin breakdown  2.  Assess vascular access sites hourly  3.  Every 4-6 hours minimum:  Change oxygen saturation probe site  4.  Every 4-6 hours:  If on nasal continuous positive airway pressure, respiratory therapy assess nares and determine need for appliance change or resting period.  Outcome: Progressing  Note: Skin assessed at regular intervals. Groin and coccyx red. Zinc paste used. Pt repositioned every 2-3 hours and as needed. Will continue to assess.     Problem: Respiratory - Adult  Goal: Achieves optimal ventilation and oxygenation  1/11/2024 1926 by Margarita Huynh, RN  Outcome: Progressing  Flowsheets (Taken 1/11/2024 1926)  Achieves optimal ventilation and oxygenation:   Assess for changes in respiratory status   Position to facilitate oxygenation and minimize respiratory effort   Assess the need for suctioning and aspirate as needed   Respiratory therapy support as indicated   Assess for changes in mentation and behavior   Oxygen supplementation based on oxygen saturation or arterial blood gases   Encourage broncho-pulmonary hygiene including cough, deep breathe, incentive spirometry   Assess and instruct to report shortness of breath or any respiratory difficulty  Note: Pulse ox WNL. O2 on @ 1 L per nasal canula. Lungs sounds are rhonchi t/o. SOB noted with exertion. Pt denies any discomfort. Will continue to assess.     
  Problem: Safety - Adult  Goal: Free from fall injury  Outcome: Progressing     Problem: Discharge Planning  Goal: Discharge to home or other facility with appropriate resources  Outcome: Progressing  Flowsheets (Taken 1/10/2024 1230)  Discharge to home or other facility with appropriate resources:   Identify barriers to discharge with patient and caregiver   Arrange for needed discharge resources and transportation as appropriate     Problem: ABCDS Injury Assessment  Goal: Absence of physical injury  Outcome: Progressing  Flowsheets (Taken 1/10/2024 1222)  Absence of Physical Injury: Implement safety measures based on patient assessment     Problem: Skin/Tissue Integrity  Goal: Absence of new skin breakdown  Description: 1.  Monitor for areas of redness and/or skin breakdown  2.  Assess vascular access sites hourly  3.  Every 4-6 hours minimum:  Change oxygen saturation probe site  4.  Every 4-6 hours:  If on nasal continuous positive airway pressure, respiratory therapy assess nares and determine need for appliance change or resting period.  Outcome: Progressing     
Blanca MONCADA RN  Outcome: Progressing     Problem: Genitourinary - Adult  Goal: Urinary catheter remains patent  1/14/2024 0950 by Blanca Daly RN  Outcome: Progressing     Problem: Cardiovascular - Adult  Goal: Maintains optimal cardiac output and hemodynamic stability  1/14/2024 0950 by Blanca Daly RN  Outcome: Progressing     
oxygenation and minimize respiratory effort   Assess the need for suctioning and aspirate as needed   Respiratory therapy support as indicated   Assess for changes in mentation and behavior   Oxygen supplementation based on oxygen saturation or arterial blood gases   Encourage broncho-pulmonary hygiene including cough, deep breathe, incentive spirometry   Assess and instruct to report shortness of breath or any respiratory difficulty  Note: Pulse ox WNL. HHFNC in use. SOB noted with exertion. Pt denies any discomfort. Will continue to assess.     Problem: Skin/Tissue Integrity - Adult  Goal: Skin integrity remains intact  Outcome: Progressing     Problem: Infection - Adult  Goal: Absence of infection at discharge  Outcome: Progressing  Goal: Absence of infection during hospitalization  1/11/2024 0719 by Cecilia Cox RN  Outcome: Progressing  1/11/2024 0014 by Margarita Huynh RN  Outcome: Progressing  Flowsheets (Taken 1/11/2024 0014)  Absence of infection during hospitalization:   Assess and monitor for signs and symptoms of infection   Monitor lab/diagnostic results   Monitor all insertion sites i.e., indwelling lines, tubes and drains   Administer medications as ordered  Note: No temperature noted. Pt is on antivirals. Labs monitored daily and as needed. Will continue to assess.     Problem: Metabolic/Fluid and Electrolytes - Adult  Goal: Electrolytes maintained within normal limits  1/11/2024 0719 by Cecilia Cox RN  Outcome: Progressing  1/11/2024 0014 by Margarita Huynh RN  Outcome: Progressing  Flowsheets (Taken 1/11/2024 0014)  Electrolytes maintained within normal limits:   Administer electrolyte replacement as ordered   Monitor labs and assess patient for signs and symptoms of electrolyte imbalances   Monitor response to electrolyte replacements, including repeat lab results as appropriate  Note: Labs monitored daily and as needed. Will continue to monitor.

## 2024-01-17 ENCOUNTER — HOSPITAL ENCOUNTER (INPATIENT)
Age: 89
LOS: 2 days | Discharge: HOSPICE | DRG: 951 | End: 2024-01-19
Attending: INTERNAL MEDICINE | Admitting: INTERNAL MEDICINE
Payer: COMMERCIAL

## 2024-01-17 VITALS
HEART RATE: 89 BPM | BODY MASS INDEX: 25.88 KG/M2 | OXYGEN SATURATION: 97 % | HEIGHT: 67 IN | DIASTOLIC BLOOD PRESSURE: 56 MMHG | RESPIRATION RATE: 40 BRPM | TEMPERATURE: 96.7 F | SYSTOLIC BLOOD PRESSURE: 100 MMHG | WEIGHT: 164.9 LBS

## 2024-01-17 DIAGNOSIS — Z51.5 ENCOUNTER FOR HOSPICE CARE: Primary | ICD-10-CM

## 2024-01-17 LAB
ALBUMIN SERPL-MCNC: 2.2 G/DL (ref 3.5–5.2)
ALBUMIN/GLOB SERPL: 1 {RATIO} (ref 1–2.5)
ALP SERPL-CCNC: 87 U/L (ref 40–129)
ALT SERPL-CCNC: 47 U/L (ref 5–41)
ANION GAP SERPL CALCULATED.3IONS-SCNC: 14 MMOL/L (ref 9–17)
AST SERPL-CCNC: 35 U/L
BASOPHILS # BLD: 0 K/UL (ref 0–0.2)
BASOPHILS NFR BLD: 0 % (ref 0–2)
BILIRUB SERPL-MCNC: 0.4 MG/DL (ref 0.3–1.2)
BUN SERPL-MCNC: 80 MG/DL (ref 8–23)
BUN/CREAT SERPL: 26 (ref 9–20)
CALCIUM SERPL-MCNC: 7.5 MG/DL (ref 8.6–10.4)
CHLORIDE SERPL-SCNC: 113 MMOL/L (ref 98–107)
CO2 SERPL-SCNC: 16 MMOL/L (ref 20–31)
CREAT SERPL-MCNC: 3.1 MG/DL (ref 0.7–1.2)
EOSINOPHIL # BLD: 0 K/UL (ref 0–0.44)
EOSINOPHILS RELATIVE PERCENT: 0 % (ref 1–4)
ERYTHROCYTE [DISTWIDTH] IN BLOOD BY AUTOMATED COUNT: 14.7 % (ref 11.8–14.4)
GFR SERPL CREATININE-BSD FRML MDRD: 19 ML/MIN/1.73M2
GLUCOSE SERPL-MCNC: 133 MG/DL (ref 70–99)
HCT VFR BLD AUTO: 30.2 % (ref 40.7–50.3)
HCT VFR BLD AUTO: 34.1 % (ref 40.7–50.3)
HGB BLD-MCNC: 11.1 G/DL (ref 13–17)
HGB BLD-MCNC: 9.9 G/DL (ref 13–17)
IMM GRANULOCYTES # BLD AUTO: 0 K/UL (ref 0–0.3)
IMM GRANULOCYTES NFR BLD: 0 %
LYMPHOCYTES NFR BLD: 0.7 K/UL (ref 1.1–3.7)
LYMPHOCYTES RELATIVE PERCENT: 4 % (ref 24–43)
MCH RBC QN AUTO: 33.4 PG (ref 25.2–33.5)
MCHC RBC AUTO-ENTMCNC: 32.8 G/DL (ref 28.4–34.8)
MCV RBC AUTO: 102 FL (ref 82.6–102.9)
MONOCYTES NFR BLD: 0.53 K/UL (ref 0.1–1.2)
MONOCYTES NFR BLD: 3 % (ref 3–12)
MORPHOLOGY: ABNORMAL
NEUTROPHILS NFR BLD: 93 % (ref 36–65)
NEUTS SEG NFR BLD: 16.29 K/UL (ref 1.5–8.1)
NRBC BLD-RTO: 0.2 PER 100 WBC
NUCLEATED RED BLOOD CELLS: 1 PER 100 WBC (ref 0–5)
PLATELET # BLD AUTO: 275 K/UL (ref 138–453)
PMV BLD AUTO: 11.6 FL (ref 8.1–13.5)
POTASSIUM SERPL-SCNC: 4.4 MMOL/L (ref 3.7–5.3)
PROT SERPL-MCNC: 4.4 G/DL (ref 6.4–8.3)
RBC # BLD AUTO: 2.96 M/UL (ref 4.21–5.77)
SODIUM SERPL-SCNC: 143 MMOL/L (ref 135–144)
WBC OTHER # BLD: 17.5 K/UL (ref 3.5–11.3)

## 2024-01-17 PROCEDURE — 99232 SBSQ HOSP IP/OBS MODERATE 35: CPT | Performed by: INTERNAL MEDICINE

## 2024-01-17 PROCEDURE — 1250000000 HC SEMI PRIVATE HOSPICE R&B

## 2024-01-17 PROCEDURE — 85014 HEMATOCRIT: CPT

## 2024-01-17 PROCEDURE — 2700000000 HC OXYGEN THERAPY PER DAY

## 2024-01-17 PROCEDURE — 6360000002 HC RX W HCPCS: Performed by: NURSE PRACTITIONER

## 2024-01-17 PROCEDURE — 85025 COMPLETE CBC W/AUTO DIFF WBC: CPT

## 2024-01-17 PROCEDURE — 85018 HEMOGLOBIN: CPT

## 2024-01-17 PROCEDURE — 2580000003 HC RX 258: Performed by: NURSE PRACTITIONER

## 2024-01-17 PROCEDURE — 94664 DEMO&/EVAL PT USE INHALER: CPT

## 2024-01-17 PROCEDURE — 6370000000 HC RX 637 (ALT 250 FOR IP): Performed by: NURSE PRACTITIONER

## 2024-01-17 PROCEDURE — 94640 AIRWAY INHALATION TREATMENT: CPT

## 2024-01-17 PROCEDURE — 80053 COMPREHEN METABOLIC PANEL: CPT

## 2024-01-17 PROCEDURE — 2580000003 HC RX 258

## 2024-01-17 PROCEDURE — 94761 N-INVAS EAR/PLS OXIMETRY MLT: CPT

## 2024-01-17 PROCEDURE — 36415 COLL VENOUS BLD VENIPUNCTURE: CPT

## 2024-01-17 PROCEDURE — 2580000003 HC RX 258: Performed by: INTERNAL MEDICINE

## 2024-01-17 RX ORDER — SCOLOPAMINE TRANSDERMAL SYSTEM 1 MG/1
1 PATCH, EXTENDED RELEASE TRANSDERMAL
Status: DISCONTINUED | OUTPATIENT
Start: 2024-01-20 | End: 2024-01-19 | Stop reason: HOSPADM

## 2024-01-17 RX ORDER — SCOLOPAMINE TRANSDERMAL SYSTEM 1 MG/1
1 PATCH, EXTENDED RELEASE TRANSDERMAL
Status: DISCONTINUED | OUTPATIENT
Start: 2024-01-17 | End: 2024-01-17 | Stop reason: HOSPADM

## 2024-01-17 RX ORDER — LORAZEPAM 2 MG/ML
1 INJECTION INTRAMUSCULAR EVERY 4 HOURS PRN
Status: DISCONTINUED | OUTPATIENT
Start: 2024-01-17 | End: 2024-01-19 | Stop reason: HOSPADM

## 2024-01-17 RX ORDER — ACETAMINOPHEN 325 MG/1
650 TABLET ORAL EVERY 6 HOURS PRN
Status: DISCONTINUED | OUTPATIENT
Start: 2024-01-17 | End: 2024-01-19 | Stop reason: HOSPADM

## 2024-01-17 RX ORDER — ACETAMINOPHEN 650 MG/1
650 SUPPOSITORY RECTAL EVERY 6 HOURS PRN
Status: DISCONTINUED | OUTPATIENT
Start: 2024-01-17 | End: 2024-01-19 | Stop reason: HOSPADM

## 2024-01-17 RX ORDER — IPRATROPIUM BROMIDE AND ALBUTEROL SULFATE 2.5; .5 MG/3ML; MG/3ML
1 SOLUTION RESPIRATORY (INHALATION) EVERY 4 HOURS PRN
Status: DISCONTINUED | OUTPATIENT
Start: 2024-01-17 | End: 2024-01-17 | Stop reason: HOSPADM

## 2024-01-17 RX ORDER — LORAZEPAM 2 MG/ML
1 INJECTION INTRAMUSCULAR EVERY 4 HOURS PRN
Status: CANCELLED | OUTPATIENT
Start: 2024-01-17

## 2024-01-17 RX ORDER — IPRATROPIUM BROMIDE AND ALBUTEROL SULFATE 2.5; .5 MG/3ML; MG/3ML
1 SOLUTION RESPIRATORY (INHALATION)
Status: CANCELLED | OUTPATIENT
Start: 2024-01-17

## 2024-01-17 RX ORDER — LORAZEPAM 2 MG/ML
1 INJECTION INTRAMUSCULAR EVERY 4 HOURS PRN
Status: DISCONTINUED | OUTPATIENT
Start: 2024-01-17 | End: 2024-01-17 | Stop reason: HOSPADM

## 2024-01-17 RX ORDER — MORPHINE SULFATE 2 MG/ML
2 INJECTION, SOLUTION INTRAMUSCULAR; INTRAVENOUS
Status: CANCELLED | OUTPATIENT
Start: 2024-01-17

## 2024-01-17 RX ORDER — ATROPINE SULFATE 10 MG/ML
3 SOLUTION/ DROPS OPHTHALMIC EVERY 30 MIN PRN
Status: DISCONTINUED | OUTPATIENT
Start: 2024-01-17 | End: 2024-01-19 | Stop reason: HOSPADM

## 2024-01-17 RX ORDER — SCOLOPAMINE TRANSDERMAL SYSTEM 1 MG/1
1 PATCH, EXTENDED RELEASE TRANSDERMAL
Status: CANCELLED | OUTPATIENT
Start: 2024-01-20

## 2024-01-17 RX ORDER — ACETAMINOPHEN 325 MG/1
650 TABLET ORAL EVERY 6 HOURS PRN
Status: CANCELLED | OUTPATIENT
Start: 2024-01-17

## 2024-01-17 RX ORDER — ATROPINE SULFATE 10 MG/ML
3 SOLUTION/ DROPS OPHTHALMIC EVERY 30 MIN PRN
Status: CANCELLED | OUTPATIENT
Start: 2024-01-17

## 2024-01-17 RX ORDER — MORPHINE SULFATE 2 MG/ML
2 INJECTION, SOLUTION INTRAMUSCULAR; INTRAVENOUS
Status: DISCONTINUED | OUTPATIENT
Start: 2024-01-17 | End: 2024-01-19 | Stop reason: HOSPADM

## 2024-01-17 RX ORDER — MORPHINE SULFATE 2 MG/ML
2 INJECTION, SOLUTION INTRAMUSCULAR; INTRAVENOUS
Status: DISCONTINUED | OUTPATIENT
Start: 2024-01-17 | End: 2024-01-17 | Stop reason: HOSPADM

## 2024-01-17 RX ORDER — ATROPINE SULFATE 10 MG/ML
3 SOLUTION/ DROPS OPHTHALMIC EVERY 30 MIN PRN
Status: DISCONTINUED | OUTPATIENT
Start: 2024-01-17 | End: 2024-01-17 | Stop reason: HOSPADM

## 2024-01-17 RX ORDER — IPRATROPIUM BROMIDE AND ALBUTEROL SULFATE 2.5; .5 MG/3ML; MG/3ML
1 SOLUTION RESPIRATORY (INHALATION)
Status: DISCONTINUED | OUTPATIENT
Start: 2024-01-17 | End: 2024-01-17

## 2024-01-17 RX ORDER — ACETAMINOPHEN 650 MG/1
650 SUPPOSITORY RECTAL EVERY 6 HOURS PRN
Status: CANCELLED | OUTPATIENT
Start: 2024-01-17

## 2024-01-17 RX ADMIN — SODIUM CHLORIDE, PRESERVATIVE FREE 10 ML: 5 INJECTION INTRAVENOUS at 11:10

## 2024-01-17 RX ADMIN — IPRATROPIUM BROMIDE AND ALBUTEROL SULFATE 1 DOSE: .5; 3 SOLUTION RESPIRATORY (INHALATION) at 10:10

## 2024-01-17 RX ADMIN — MORPHINE SULFATE 2 MG: 2 INJECTION, SOLUTION INTRAMUSCULAR; INTRAVENOUS at 17:05

## 2024-01-17 RX ADMIN — SODIUM CHLORIDE: 4.5 INJECTION, SOLUTION INTRAVENOUS at 02:58

## 2024-01-17 RX ADMIN — SODIUM CHLORIDE, PRESERVATIVE FREE 10 ML: 5 INJECTION INTRAVENOUS at 11:09

## 2024-01-17 RX ADMIN — LORAZEPAM 1 MG: 2 INJECTION INTRAMUSCULAR; INTRAVENOUS at 21:06

## 2024-01-17 RX ADMIN — IPRATROPIUM BROMIDE AND ALBUTEROL SULFATE 1 DOSE: .5; 3 SOLUTION RESPIRATORY (INHALATION) at 05:38

## 2024-01-17 NOTE — VIRTUAL HEALTH
Jonathan Wilkinsoncarlos, was evaluated through a synchronous (real-time) audio-video encounter. The patient (and/or guardian if applicable) is aware that this is a billable service, which includes applicable co-pays. This virtual visit was conducted with patient's (and/or legal guardian's) consent. Patient identification was verified, and a caregiver was present when appropriate.  The patient was located at Facility (Appt Department): [facility] ICU  [address] [address]  Loc: [phone]  The provider was located at Facility (Login Dept): [facility] INF DIS  [address] [address]  [phone]    Consults     Total time spent on this encounter: Not billed by time    --Gutierrez Cardozo MD on 1/15/2024 at 10:40 AM    An electronic signature was used to authenticate this note.    [facility] - Progress Note  COVID 19 Patient Telemedicine  Today's Date and Time: 1/15/2024, 10:40 AM    Impression :     COVID 19 Confirmed Infection  Covid tests:  Positive Covid Test Date: Unable to locate positive Covid test-per ED notes, patient tested positive 5 days prior to admission on 1/10/24 at home.  Viral respiratory panel, which includes Covid 19, ordered 1/10/24: Positive for Covid  Vaccination Status:  It appears he received 3 doses of the Pfizer vaccine in 2021  Acute hypoxic respiratory failure  MARYLU on CKD IV  NSTEMI-Type II  Elevated CRP  Shock requiring vasopressors      Recommendations:   Antibiotic treatment:  Cefepime adjusted for renal failure. Stop date 1-16-24    Covid Rx:    Remdesivir: Requested 1-10-24. Completed 1-14-24  Decadron: Started 1-10-24. Completed 1-15-24  Actemra: Received 1-10-24  Paxlovid: Out of the window  Monitor CRP      Medical Decision Making/Summary/Discussion:1/15/2024     Patient admitted with COVID 19 infection  Severely ill, likely going into inflammatory phase with cytokine storm  Very high CRP  Patient will 
Jonathan Wilkinsoncarlos, was evaluated through a synchronous (real-time) audio-video encounter. The patient (and/or guardian if applicable) is aware that this is a billable service, which includes applicable co-pays. This virtual visit was conducted with patient's (and/or legal guardian's) consent. Patient identification was verified, and a caregiver was present when appropriate.  The patient was located at Facility (Appt Department): [facility] ICU  [address] [facility] [address] [address]  Loc: [phone]  The provider was located at Facility (Login Dept): [facility] INF DIS  [address] [address]  [phone]    Consults     Total time spent on this encounter: Not billed by time    --uGtierrez Cardozo MD on 1/12/2024 at 8:05 AM    An electronic signature was used to authenticate this note.    [facility] - Progress Note  COVID 19 Patient Telemedicine  Today's Date and Time: 1/12/2024, 8:05 AM    Impression :     COVID 19 Confirmed Infection  Covid tests:  Positive Covid Test Date: Unable to locate positive Covid test-per ED notes, patient tested positive 5 days prior to admission on 1/10/24 at home.  Viral respiratory panel, which includes Covid 19, ordered 1/10/24: Positive for Covid  Vaccination Status:  It appears he received 3 doses of the Pfizer vaccine in 2021  Acute hypoxic respiratory failure  MARYLU on CKD IV  NSTEMI-Type II  Elevated CRP  Shock requiring vasopressors      Recommendations:   Antibiotic treatment:  Cefepime adjusted for renal failure  Obtain C-diff and stool culture for diarrhea although diarrhea is likely from Covid    Covid Rx:    Remdesivir: Requested 1-10-24  Decadron: Started 1-10-24  Actemra: Received 1-10-24  Paxlovid: Out of the window  Monitor CRP      Medical Decision Making/Summary/Discussion:1/12/2024     Patient admitted with COVID 19 infection  Severely ill, likely going into inflammatory phase with cytokine storm  Very 
Jonathan Wilkinsoncarlos, was evaluated through a synchronous (real-time) audio-video encounter. The patient (and/or guardian if applicable) is aware that this is a billable service, which includes applicable co-pays. This virtual visit was conducted with patient's (and/or legal guardian's) consent. Patient identification was verified, and a caregiver was present when appropriate.  The patient was located at Facility (Appt Department): [facility] SURG  [address] [address]  Loc: [phone]  The provider was located at Facility (Login Dept): [facility] INF DIS  [address] [address]  [phone]    Consults     Total time spent on this encounter: Not billed by time    --Gutierrez Cardozo MD on 1/17/2024 at 8:07 AM    An electronic signature was used to authenticate this note.    [facility] - Progress Note  COVID 19 Patient Telemedicine  Today's Date and Time: 1/17/2024, 8:07 AM    Impression :     COVID 19 Confirmed Infection  Covid tests:  Positive Covid Test Date: Unable to locate positive Covid test-per ED notes, patient tested positive 5 days prior to admission on 1/10/24 at home.  Viral respiratory panel, which includes Covid 19, ordered 1/10/24: Positive for Covid  Vaccination Status:  It appears he received 3 doses of the Pfizer vaccine in 2021  Acute hypoxic respiratory failure  MARYLU on CKD IV  NSTEMI-Type II  Elevated CRP  Shock requiring vasopressors      Recommendations:   Antibiotic treatment:  Completed Cefepime adjusted for renal failure. Stop date 1-16-24    Covid Rx:    Remdesivir: Requested 1-10-24. Completed 1-14-24  Decadron: Started 1-10-24. Completed 1-15-24  Actemra: Received 1-10-24  Paxlovid: Out of the window  Monitor CRP: 123->240-->60      Medical Decision Making/Summary/Discussion:1/17/2024     Patient admitted with COVID 19 infection  Severely ill, likely going into inflammatory phase with cytokine 
Jonathan Wilkinsoncarlos, was evaluated through a synchronous (real-time) audio-video encounter. The patient (and/or guardian if applicable) is aware that this is a billable service, which includes applicable co-pays. This virtual visit was conducted with patient's (and/or legal guardian's) consent. Patient identification was verified, and a caregiver was present when appropriate.  The patient was located at Facility (Appt Department): [facility] ICU  [address] [facility] [address]  Loc: [phone]  The provider was located at Facility (Login Dept): [facility] INF DIS  [address] [address]  [phone]    Consults     Total time spent on this encounter: Not billed by time    --Gutierrez Cardozo MD on 1/14/2024 at 1:12 PM    An electronic signature was used to authenticate this note.    [facility] - Progress Note  COVID 19 Patient Telemedicine  Today's Date and Time: 1/14/2024, 1:12 PM    Impression :     COVID 19 Confirmed Infection  Covid tests:  Positive Covid Test Date: Unable to locate positive Covid test-per ED notes, patient tested positive 5 days prior to admission on 1/10/24 at home.  Viral respiratory panel, which includes Covid 19, ordered 1/10/24: Positive for Covid  Vaccination Status:  It appears he received 3 doses of the Pfizer vaccine in 2021  Acute hypoxic respiratory failure  MARYLU on CKD IV  NSTEMI-Type II  Elevated CRP  Shock requiring vasopressors      Recommendations:   Antibiotic treatment:  Cefepime adjusted for renal failure  Obtain C-diff and stool culture for diarrhea although diarrhea is likely from Covid    Covid Rx:    Remdesivir: Requested 1-10-24  Decadron: Started 1-10-24  Actemra: Received 1-10-24  Paxlovid: Out of the window  Monitor CRP      Medical Decision Making/Summary/Discussion:1/14/2024     Patient admitted with COVID 19 infection  Severely ill, likely going into inflammatory phase with cytokine storm  Very 
103 24 (!) 83 % -- --       It is not possible to conduct a full examination by Telehealth.  Information below is composite of information provided by the patient, RN and physicians on site.    General Appearance: Awake, alert, and in respiratory distress  Head:  Normocephalic, no trauma  Eyes: Pupils equal, round, reactive to light; sclera anicteric; conjunctivae pink. No embolic phenomena.  ENT: Oropharynx clear, without erythema, exudate, or thrush. No tenderness of sinuses. Mouth/throat: mucosa pink and moist. No lesions. Dentition in good repair.  Neck:Supple, without lymphadenopathy. Thyroid normal, No bruits.  Pulmonary/Chest: rales in mid and lowe posterior chest  No dullness to percussion.   Cardiovascular: Regular rate and rhythm without murmurs, rubs, or gallops.   Abdomen: Soft, non tender. Bowel sounds normal. No organomegaly. diarrhea  All four Extremities: No cyanosis, clubbing, edema, or effusions.  Neurologic: No gross sensory or motor deficits.  Skin: Warm and dry with good turgor.No signs of peripheral arterial or venous insufficiency. No ulcerations. No open wounds.    Medical Decision Making -Laboratory:   I have independently reviewed/ordered the following labs:    CBC with Differential:   Recent Labs     01/08/24  0645 01/10/24  0835   WBC 6.6 4.2   HGB 12.3* 15.1   HCT 38.2* 47.9    369   LYMPHOPCT 17* 10*   MONOPCT 17* 12     BMP:   Recent Labs     01/08/24  0645 01/10/24  0835   * 150*   K 4.5 4.9   * 112*   CO2 24 23   BUN 32* 43*   CREATININE 2.2* 2.9*     Hepatic Function Panel:   Recent Labs     01/08/24  0645 01/10/24  0835   PROT 6.0* 7.1   LABALBU 3.1* 3.3*   BILITOT 0.3 0.5   ALKPHOS 140* 138*   ALT 44* 69*   AST 45* 66*     No results for input(s): \"RPR\" in the last 72 hours.  No results for input(s): \"HIV\" in the last 72 hours.  No results for input(s): \"BC\" in the last 72 hours.  Lab Results   Component Value Date/Time    BACTERIA 2+ 01/10/2024 09:08 AM    
1330 (!) 99/40 -- -- 63 13 96 %   01/13/24 1300 (!) 94/37 -- -- 62 22 97 %   01/13/24 1200 (!) 108/50 98.3 °F (36.8 °C) Temporal 72 23 94 %   01/13/24 1130 (!) 98/42 -- -- 64 19 96 %   01/13/24 1125 (!) 98/42 -- -- 57 20 100 %   01/13/24 1100 (!) 98/49 -- -- 62 20 96 %   01/13/24 1030 (!) 103/49 -- -- 69 23 90 %   01/13/24 1000 (!) 109/43 -- -- 69 21 96 %         It is not possible to conduct a full examination by Telehealth.  Information below is composite of information provided by the patient, RN and physicians on site.    General Appearance: Awake, alert, and in respiratory distress  Head:  Normocephalic, no trauma  Eyes: Pupils equal, round, reactive to light; sclera anicteric; conjunctivae pink. No embolic phenomena.  ENT: Oropharynx clear, without erythema, exudate, or thrush. No tenderness of sinuses. Mouth/throat: mucosa pink and moist. No lesions. Dentition in good repair.  Neck:Supple, without lymphadenopathy. Thyroid normal, No bruits.  Pulmonary/Chest: rales in mid and lowe posterior chest  No dullness to percussion.   Cardiovascular: Regular rate and rhythm without murmurs, rubs, or gallops.   Abdomen: Soft, non tender. Bowel sounds normal. No organomegaly. diarrhea  All four Extremities: No cyanosis, clubbing, edema, or effusions.  Neurologic: No gross sensory or motor deficits.  Skin: Warm and dry with good turgor.No signs of peripheral arterial or venous insufficiency. No ulcerations. No open wounds.    Medical Decision Making -Laboratory:   I have independently reviewed/ordered the following labs:    CBC with Differential:   Recent Labs     01/13/24  1030   WBC 9.0   HGB 9.9*   HCT 30.7*      LYMPHOPCT 5*   MONOPCT 4       BMP:   Recent Labs     01/12/24  0525 01/13/24  0522    145*   K 4.0 3.8   * 114*   CO2 20 19*   BUN 54* 59*   CREATININE 2.4* 2.3*       Hepatic Function Panel:   Recent Labs     01/12/24  0525 01/13/24  0522   PROT 5.2* 5.0*   LABALBU 2.3* 2.3*   BILITOT 0.2* 
(Restricted: peds pts or suitable admitted adults) [[id number]]  (Abnormal) Collected: 01/10/24 1632    Order Status: Completed Specimen: Nasopharyngeal Swab Updated: 01/10/24 2336     Specimen Description .NASOPHARYNGEAL SWAB     Adenovirus PCR Not Detected     Coronavirus 229E PCR Not Detected     Coronavirus HKU1 PCR Not Detected     Coronavirus NL63 PCR Not Detected     Coronavirus OC43 PCR Not Detected     SARS-CoV-2, PCR DETECTED     Comment: Results reported to the appropriate Health Department        Human Metapneumovirus PCR Not Detected     Rhino/Enterovirus PCR Not Detected     Influenza A by PCR Not Detected     Influenza B by PCR Not Detected     Parainfluenza 1 PCR Not Detected     Parainfluenza 2 PCR Not Detected     Parainfluenza 3 PCR Not Detected     Parainfluenza 4 PCR Not Detected     Resp Syncytial Virus PCR Not Detected     Bordetella parapertussis by PCR Not Detected     B Pertussis by PCR Not Detected     Chlamydia pneumoniae By PCR Not Detected     Mycoplasma pneumo by PCR Not Detected     Comment: Performed by multiplexed nucleic acid assay.       Sputum gram stain [[id number]] Collected: 01/10/24 1215    Order Status: Canceled Specimen: Sputum Expectorated     Culture, Urine [[id number]] Collected: 01/10/24 0908    Order Status: Completed Specimen: Urine, clean catch Updated: 01/11/24 1916     Specimen Description .CLEAN CATCH URINE     Culture NO SIGNIFICANT GROWTH    Culture, Blood 2 [[id number]] Collected: 01/10/24 0841    Order Status: Completed Specimen: Blood Updated: 01/15/24 0931     Specimen Description .BLOOD     Special Requests RAC 5ML     Culture NO GROWTH 5 DAYS    Blood Culture 1 [[id number]] Collected: 01/10/24 0835    Order Status: Completed Specimen: Blood Updated: 01/15/24 0931     Specimen Description .BLOOD     Special Requests LAC 5 ML     Culture NO GROWTH 5 DAYS              Medical Decision Making-Other:     Note:      YOLIS Reed 
section  Changes in ROS:  Unchanged  Please see daily details in Review of Symptoms section  Discussion with Referring Physician or Service  Dr. Dunn  Laboratory and Radiologic Studies with personal review of radiologic studies  Laboratory  Radiology  Microbiology  Covid positive  Please see Details in daily Interval Changes Section devoted to Lab, Micro and Radiology   Review of Infection Control and Prevention measures:  Droplet plus  Please see daily details in Infection Control Recommendations section  Administer medications as ordered:  Remdesivir  Decadron  Actemra  Review of Antimicrobial Stewardship Measures:  Targeted Therapy  Please see daily details in Antimicrobial Stewardship Recommendations section   Prognosis:  Very guarded  Review of Discharge Planning:  Please see daily details in Coordination of Outpatient Care section  Review of need for outpatient follow up.      Gutierrez Cardozo MD  1/11/2024

## 2024-01-17 NOTE — DISCHARGE SUMMARY
Discharge Summary    Jonathan Iglesias  :  1934  MRN:  [id number]    Admit date:  1/10/2024      Discharge date: 2024     Admitting Physician:  Jony Dunn MD    Discharge Diagnoses:    Principal Problem:    Severe sepsis (HCC)  Active Problems:    Other hypotension    Type 2 MI (myocardial infarction) (HCC)    Acute kidney injury superimposed on CKD (HCC)    Acute respiratory failure with hypoxia (HCC)    COVID-19 virus infection - 2024    Advanced age    Panlobular emphysema (HCC)    NSTEMI (non-ST elevated myocardial infarction) (HCC)    Acute hypoxic respiratory failure (HCC)    COVID    Hypernatremia    Arterial hypotension    Renal dysfunction    Septicemia (HCC)  Resolved Problems:    * No resolved hospital problems. *      Hospital Course:   Jonathan Iglesias is a 89 y.o. male admitted with severe sepsis.  He presented to the emergency room for evaluation of hypoxia from [facility].  They reported that his SpO2 was 80% on 4 L.  They reported hypotension of 85/63.  The patient was recently diagnosed with COVID-19 5 days prior.  He also had a hospitalization on  for rhabdomyolysis.  Facility also reported patient having diarrhea.  During patient's workup chest x-ray showed atypical pneumonia.  Patient's VBG showed a pH of 7.408, pCO2 39.7, pO2 28.3 and bicarb of 24.5.  Patient's sodium was 150 and patient was found to have MARYLU with a BUN of 43 and a creatinine of 2.9.  Initial lactic acid was 3.6 following fluid resuscitation decreased to 2.7.  Patient's proBNP was 1034.  Troponin was elevated at 176 followed by 153.  Patient was also found to have elevated LFTs with an alk phos of 138, ALT 69, AST of 66 but total bilirubin was normal at 0.5.  No leukocytosis was seen.  Urinalysis was negative for UTI.  Patient was placed on high flow oxygen at 55 L at 92% FiO2.  Patient was febrile at Tmax 100.4, tachypneic at 24 hypotensive 85/42.  During patient's admission nephrology was consulted as

## 2024-01-17 NOTE — PROGRESS NOTES
Facsimile Transmission Cover Sheet    Information contained in this transmission is for the sole use of the intended recipients and may contain confidential and privileged information. Any unauthorized review, use, disclosure or distribution is prohibited. If you are not the intended recipient, please contact the sender and destroy all copies of the original message.  Disclosure is made for the purpose of healthcare operations and continuity of care.      To: __Jony Dunn MD________________________    From: Speech Therapy       Sender:_Laura Hale M.A., CCC-SLP_________________ Phone Number: [phone] ([facility])    Jonathan Iglesias current unit  [][facility] [phone]  [phone]    Your bedside evaluation order for Jonathan Iglesias has been completed. Based on the results speech therapy recommends:     Diet of easy to chew solids with moderately (honey) thick liquids, NO STRAWS.     MBSS be completed to objectively determine safest, least restrictive diet.     If you agree please enter the new diet order in [facility] or telephone the nursing unit. Diet will not change without your order.   Thank you,  Electronically signed by: Laura Hale M.A., CCC-SLP        
                     Facsimile Transmission Cover Sheet    Information contained in this transmission is for the sole use of the intended recipients and may contain confidential and privileged information. Any unauthorized review, use, disclosure or distribution is prohibited. If you are not the intended recipient, please contact the sender and destroy all copies of the original message.  Disclosure is made for the purpose of healthcare operations and continuity of care.      To: __Marva Burnham.________________________    From: Speech Therapy       Sender:_Deanna Rayo M.S., CCC-SLP  _________________ Phone Number: [phone] ([facility])    Jonathan Iglesias current unit  [x][facility] [phone]  [phone]    Your bedside evaluation order for Jonathan Iglseias has been completed. Based on the results speech therapy recommends:     Diet of Minced and Moist solids (no mixed consistencies)  Moderately Thick Liquids (No straws)    Modified Barium Swallow Study to objectively assess swallowing function and ensure safest, least restrictive diet level.     If you agree please enter the new diet order in [facility] or telephone the nursing unit. Diet will not change without your order.   Thank you,  Electronically signed by: Deanna Rayo M.S., CCC-SLP        
  Cefepime Extended Interval Interchange    The following ordered dose of Cefepime has been changed to optimize its pharmacodynamic profile as approved by the Patient Care Review Committee.    Ordered Dose    __ 1 gm IV every 8-12 hrs  (30 minute infusion)      _x_ 2 gm  IV every 24 hrs (30 minute infusion)      Recent Labs     01/10/24  0835 01/10/24  1445 01/11/24  0517   CREATININE 2.9* 3.1* 2.8*     Estimated Creatinine Clearance: 17 mL/min (A) (based on SCr of 2.8 mg/dL (H)).      CrCl Dose   >60 1-2 gm q8-12hrs over 4 hours   30-59 or CRRT 1-2gm [id number] over 4 hours   <30 500mg-2gm q24hrs over 4 hours or 1gm q12hrs over 4 hours   <10 or HD 500mg-1gm q24hrs over 4 hours       New Dose    Cefepime   2 gm  IV q 24 hrs  over 4 hours.        Pharmacists should be contacted for issues concerning drug compatibility with multiple IV medications.  All doses will be prepared using 50ml bag to be infused over 4-hours at a rate of 12.5ml/hr.    Thank You,  Kaela Looney, RPH1/11/20242:40 PM    
  Piperacillin-Tazobactam Extended Interval Interchange    The following ordered dose of Piperacillin-Tazobactam has been changed to optimize its pharmacodynamic profile as approved by the Patient Care Review Committee.    Recent Labs     01/08/24  0645 01/10/24  0835   CREATININE 2.2* 2.9*     Estimated Creatinine Clearance: 16 mL/min (A) (based on SCr of 2.9 mg/dL (H)).      Ordered Dose    _x_ 3.375 gm IV q6 or 8hrs (30 minute infusion)      __ 4.5gm IV q6 or 8hrs  (30 minute infusion)      __ 2.25gm IV q6 or 8hrs (30 minute infusion)      New Dose    CrCl  ? 20ml/min    __ 3.375gm IV q8hrs  (4-hour infusion)      CrCl < 20ml/min     _x_ 3.375gm IV q12hrs  (4-hour infusion)      Pharmacists should be contacted for issues concerning drug compatibility with multiple IV medications.  All doses will be prepared using 50ml D5W to be infused over 4-hours at a rate of 12.5ml/hr.    Thank You,  Kaela Looney, RPH1/10/299102:08 PM    
 APRN - Progress Note    Patient - Jonathan Iglesias  Date of Admission -  1/10/2024  7:52 AM  Date of Evaluation -  2024  Hospital Day - 6      SUBJECTIVE:     The Jonathan Iglesias is a 89 y.o. male resting in bed no distress. No hypoxia. Pleasantly confused.        ROS:   Constitutional: negative  for fevers, and negative for chills.  Respiratory: negative for shortness of breath, negative for cough, and negative for wheezing  Cardiovascular: negative for chest pain, and negative for palpitations  Gastrointestinal: negative for abdominal pain, negative for nausea,negative for vomiting, negative for diarrhea, and negative for constipation    All other systems were reviewed with the patient and are negative unless otherwise stated in HPI.      OBJECTIVE:     VITAL SIGNS:  Patient Vitals for the past 8 hrs:   BP Temp Temp src Pulse Resp SpO2 Weight   24 0713 (!) 127/58 97.1 °F (36.2 °C) Temporal 76 20 90 % --   24 0525 -- -- -- -- -- 93 % --   24 0355 -- -- -- -- -- -- 75.5 kg (166 lb 6.4 oz)         Temp: 97.1 °F (36.2 °C)  Temp range:    Temp  Av.1 °F (36.2 °C)  Min: 96.8 °F (36 °C)  Max: 97.6 °F (36.4 °C)    BP: (!) 127/58  BP Range:      Systolic (24hrs), Av , Min:82 , Max:127      Diastolic (24hrs), Av, Min:34, Max:58    Pulse: 76  Pulse Range:    Pulse  Av.1  Min: 57  Max: 76    Respirations: 20  Resp Range:   Resp  Av.4  Min: 17  Max: 24    SpO2: 90 % Room Air  SpO2 range:   SpO2  Av.9 %  Min: 90 %  Max: 97 %    Weight  Wt Readings from Last 3 Encounters:   24 75.5 kg (166 lb 6.4 oz)   23 75 kg (165 lb 6.4 oz)   23 76.4 kg (168 lb 6.4 oz)     Body mass index is 26.06 kg/m².    24HR INTAKE/OUTPUT:      Intake/Output Summary (Last 24 hours) at 2024 0848  Last data filed at 2024 0826  Gross per 24 hour   Intake 580 ml   Output 1850 ml   Net -1270 ml     Date 24 0000 - 24 2359   Shift [id number] 24 Hour 
 APRN - Progress Note    Patient - Jonathan Iglesias  Date of Admission -  1/10/2024  7:52 AM  Date of Evaluation -  2024  Hospital Day - 7      SUBJECTIVE:     The Jonathan Iglesias is a 89 y.o. male resting in bed no distress. Sons are at side. Discussed rectal bleeding, worsening renal function and decreased urine output.  At this time we will make him a DNRCC.  We will stop treatments and make him comfortable at this time.  Will consider Facility on Friday.     ROS:    Constitutional: negative  for fevers, and negative for chills.  Respiratory: negative for shortness of breath, negative for cough, and negative for wheezing  Cardiovascular: negative for chest pain, and negative for palpitations  Gastrointestinal: negative for abdominal pain, negative for nausea,negative for vomiting, negative for diarrhea, and negative for constipation    All other systems were reviewed with the patient and are negative unless otherwise stated in HPI.      OBJECTIVE:     VITAL SIGNS:  Patient Vitals for the past 8 hrs:   BP Temp Temp src Pulse Resp SpO2 Weight   24 0744 (!) 98/56 (!) 96.7 °F (35.9 °C) Temporal 66 (!) 36 (!) 85 % --   24 0538 -- -- -- -- (!) 40 92 % --   24 0500 -- -- -- -- -- -- 74.8 kg (164 lb 14.5 oz)         Temp: (!) 96.7 °F (35.9 °C)  Temp range:    Temp  Av.9 °F (36.1 °C)  Min: 96.7 °F (35.9 °C)  Max: 97.1 °F (36.2 °C)    BP: (!) 98/56  BP Range:      Systolic (24hrs), Av , Min:98 , Max:119      Diastolic (24hrs), Av, Min:53, Max:60    Pulse: 66  Pulse Range:    Pulse  Av.9  Min: 62  Max: 95    Respirations: (!) 36  Resp Range:   Resp  Av.7  Min: 22  Max: 40    SpO2: (!) 85 % (increased O2 to 3 L/min) supplemental O2  SpO2 range:   SpO2  Av.3 %  Min: 85 %  Max: 100 %    Weight  Wt Readings from Last 3 Encounters:   24 74.8 kg (164 lb 14.5 oz)   23 75 kg (165 lb 6.4 oz)   23 76.4 kg (168 lb 6.4 oz)     Body mass index is 25.83 kg/m².    24HR 
 Patient resting in bed, assessment and vitals complete, see flow sheet for documentation, denies pain at this time, lung sounds noted diminished with rhonchi, monitor shows normal sinus héctor, stiles patent draining, repositioned for comfort, all needs met, call light within reach.  
Alejandro Leung M.D.  Internal Medicine Progress Note    Patient: Jonathan Iglesias  Date of Admission: 1/10/2024  7:52 AM  Date of Evaluation: 1/13/2024      SUBJECTIVE:    Jonathan Iglesias is a 89 y.o. male who was seen today for follow up of Severe sepsis due to Covid-19 virus.  He  is starting to feel better this morning .  He was able to be weaned off Dopamine drip this AM and his BP and HR are much better.  He denies fever or chills and has been afebrile.  He states his appetite is improving and he is tolerating diet without any nausea, vomiting or diarrhea.    ROS:   Constitutional: negative  for fevers, and negative for chills.  Respiratory: positive for shortness of breath, positive for cough, and negative for wheezing  Cardiovascular: negative for chest pain, and negative for palpitations  Gastrointestinal: negative for abdominal pain, negative for nausea,negative for vomiting, negative for diarrhea, and negative for constipation     All other systems were reviewed with the patient and are negative unless otherwise stated in HPI    -----------------------------------------------------------------  OBJECTIVE:  Vitals:   Temp: 97.9 °F (36.6 °C)  BP: (!) 116/54  Respirations: 19  Pulse: 78  SpO2: 95 % on room air    Weight  Wt Readings from Last 3 Encounters:   01/13/24 75.8 kg (167 lb 2 oz)   12/31/23 75 kg (165 lb 6.4 oz)   09/25/23 76.4 kg (168 lb 6.4 oz)     Body mass index is 26.18 kg/m².    24HR INTAKE/OUTPUT:      Intake/Output Summary (Last 24 hours) at 1/13/2024 0843  Last data filed at 1/13/2024 0838  Gross per 24 hour   Intake 2996.38 ml   Output 1875 ml   Net 1121.38 ml       Exam:  GEN:    Awake, alert and oriented x 3.   EYES:  EOMI, pupils equal   NECK: Supple. No lymphadenopathy.  No carotid bruit  CVS:    regular rate and rhythm, no audible murmur  PULM:  diminished with coarse scattered rhonchi, no acute respiratory distress  ABD:    Bowels sounds normal.  Abdomen is soft.  No distention.  
Alejandro Leung M.D.  Internal Medicine Progress Note    Patient: Jonathan Iglesias  Date of Admission: 1/10/2024  7:52 AM  Date of Evaluation: 1/14/2024      SUBJECTIVE:    Jonathan Iglesias is a 89 y.o. male who was seen today for follow up of Severe sepsis with septic shock due to Covid-19 virus.  He initially required Levophed followed by Dopamine drips on admission.  Levophed weaned off Friday, and Dopamine was weaned off Sat morning and BP's have been stable.  He is feeling a little better today but c/o hiccups.  Baclofen was started yesterday and hiccups are improved but not resolved.  Son states he was giving pt sips of unthickened water yesterday \"which helped his hicupps for a little bit\" but nursing states pt was choking on the water and is concerned for possible aspiration as pt's O2 saturations a little lower than yesterday. .  Stat port CXR ordered.     ROS:   Constitutional: negative  for fevers, and negative for chills.  Respiratory: positive for shortness of breath, positive for cough, and negative for wheezing  Cardiovascular: negative for chest pain, and negative for palpitations  Gastrointestinal: negative for abdominal pain, negative for nausea,negative for vomiting, negative for diarrhea, and negative for constipation     All other systems were reviewed with the patient and are negative unless otherwise stated in HPI    -----------------------------------------------------------------  OBJECTIVE:  Vitals:   Temp: 98.6 °F (37 °C)  BP: (!) 121/48  Respirations: 23  Pulse: 69  SpO2: 92 % on room air    Weight  Wt Readings from Last 3 Encounters:   01/14/24 74.9 kg (165 lb 2 oz)   12/31/23 75 kg (165 lb 6.4 oz)   09/25/23 76.4 kg (168 lb 6.4 oz)     Body mass index is 25.86 kg/m².    24HR INTAKE/OUTPUT:      Intake/Output Summary (Last 24 hours) at 1/14/2024 0946  Last data filed at 1/14/2024 0800  Gross per 24 hour   Intake 3937.38 ml   Output 2175 ml   Net 1762.38 ml       Exam:  GEN:    Awake, 
Arielle Escobar CNP notified of low urine output and continued bloody stools with clots. Son called and given updated on patients condition.   
Arielle NP in department to check on pt's BP. BP continues to be low while bolus is infusing. See orders for Levophed.   
Assessment and vital signs as charted. Pt denies pain at this time. Rivero catheter intact, draining and patent. Rivero care done at this time. IV fluid and medication infusing as ordered. Pt denies and other needs. Call light in reach. Bed alarm on. Will continue to monitor.   
Called cardiology office for consult. Spoke with ARIANNA Chavez. [place] served infectious disease.   
Catheter placed at this time, patient tolerates well, noted 800ml out put. Patient noted to be bradycardic at 42 bpm, Marva TUCKER notified, new order for EKG, completed at this time, care on-going.  
Cesar from access RN here to place PICC line, states ok to use, tubing changed to fluids and hooked to double luman PICC on right upper arm.  
Comprehensive Nutrition Assessment    Type and Reason for Visit:  Initial    Nutrition Recommendations/Plan:   Encourage oral intakes  Resume Ensure use  Resume vit C, zinc, mvi  Increase vit D to 2000 units daily please     Malnutrition Assessment:  Malnutrition Status:  At risk for malnutrition (Comment) (01/11/24 9987)    Context:  Acute Illness     Findings of the 6 clinical characteristics of malnutrition:  Energy Intake:  No significant decrease in energy intake  Weight Loss:  No significant weight loss     Body Fat Loss:  Unable to assess     Muscle Mass Loss:  Unable to assess    Fluid Accumulation:  Mild Extremities   Strength:  Not Performed    Nutrition Assessment:    Predicted suboptimal nutrient intakes r/t altered respiratory status, AEB covid. Renal insult which may be r/t diarrhea pta and volume deficit (elevated Na+ on admission), on D5 at 125 ml/hr currently. Weight does appear relatively stable with increase likely with hydration. PO intakes reportedly good thus far but will add Ensure again as he was using this at home. Recommend resume use of mvi, vit C, zinc as at nursing home and increase vit D3 to 2000 units daily.    Nutrition Related Findings:    trace BLE edema. + b/s and soft bm. Wound Type: None       Current Nutrition Intake & Therapies:    Average Meal Intake: %  Average Supplements Intake: None Ordered  ADULT DIET; Regular; Low Fat/Low Chol/High Fiber/2 gm Na  ADULT ORAL NUTRITION SUPPLEMENT; Breakfast, Lunch, Dinner; Standard High Calorie/High Protein Oral Supplement    Anthropometric Measures:  Height: 170.2 cm (5' 7\")  Ideal Body Weight (IBW): 148 lbs (67 kg)    Admission Body Weight: 74.8 kg (165 lb)  Current Body Weight: 76.7 kg (169 lb), 114.2 % IBW. Weight Source: Bed Scale  Current BMI (kg/m2): 26.5  Usual Body Weight: 76.4 kg (168 lb 6.4 oz) (In September and 176.2# in May)  % Weight Change (Calculated): 0.4  Weight Adjustment For: No Adjustment                 BMI 
Comprehensive Nutrition Assessment    Type and Reason for Visit:  Reassess    Nutrition Recommendations/Plan:   Encourage oral fluids     Malnutrition Assessment:  Malnutrition Status:  At risk for malnutrition (Comment) (01/11/24 8941)    Context:  Acute Illness     Findings of the 6 clinical characteristics of malnutrition:  Energy Intake:  No significant decrease in energy intake  Weight Loss:  No significant weight loss     Body Fat Loss:  Unable to assess     Muscle Mass Loss:  Unable to assess    Fluid Accumulation:  Mild Extremities   Strength:  Not Performed    Nutrition Assessment:    Continued suboptimal nutrient intakes with worsening renal indices and \"dry\" appearing serum with S.Osom 324 mOsm (calculated). On ivf 75 ml/hr. Mvi and vit C were added to treatment plan but not vit D or zinc. Nursing reports better PO intakes this noon but still has liquids to consume. Encouragement provided but uncertain effectiveness as he repeats phrases that are difficult to understand.    Nutrition Related Findings:    no edema. loose/soft bm. Wound Type: None       Current Nutrition Intake & Therapies:    Average Meal Intake: 1-25%  Average Supplements Intake: 1-25%  ADULT ORAL NUTRITION SUPPLEMENT; Breakfast, Lunch, Dinner; Standard High Calorie/High Protein Oral Supplement  ADULT DIET; Dysphagia - Minced and Moist; Low Fat/Low Chol/High Fiber/2 gm Na; Moderately Thick (Honey)    Anthropometric Measures:  Height: 170.2 cm (5' 7\")  Ideal Body Weight (IBW): 148 lbs (67 kg)    Admission Body Weight: 74.8 kg (165 lb)  Current Body Weight: 75.5 kg (166 lb 6.4 oz), 114.2 % IBW. Weight Source: Bed Scale  Current BMI (kg/m2): 26.1  Usual Body Weight: 76.4 kg (168 lb 6.4 oz) (September)  % Weight Change (Calculated): -1.2  Weight Adjustment For: No Adjustment                 BMI Categories: Overweight (BMI 25.0-29.9)    Estimated Daily Nutrient Needs:  Energy Requirements Based On: Kcal/kg  Weight Used for Energy 
Dopamine started at this time per order for bradycardia  
Dopamine stopped at this time. Patient sleeping in bed. Respirations even and unlabored. Bed alarm in place.   
Dr. Calixto did video visit with pt. See orders.   
Dr. Calixto visits via telehealth.  
Dr. Dunn and Marva NP at bedside.  
Dr. Figueroa did video visit with pt. See orders.   
Dr. Leung and Marva at bedside  
Dr. Perez video visit done at this time, see orders.   
Dr. Perez video visit done at this time.   
Duoneb given. Acapella and EZPAP done with fair effort and much encouragement. Asked patient to cough, it is wet and congested, but non-productive. Patient unable to provide a sputum sample at this time.  
Entered patient's room for afternoon head to toe reassessment. Patient resting in the chair at this time. A&O x3, calm, and cooperative. Patient denies of pain at this time. Head to toe reassessment completed at this time, see flowsheets for more details. Family at the bedside at this time. Troponin and EKG completed at this time. Patient does not currently complains of chest pain and seems to be resting reacefully. Patient denies no more needs at this time. Call light within reach. Chair alarm on. Bed/chair wheels locked. Bed in lowest position.    
Entered patient's room for late morning head to toe reassessment. Patient resting in the chair at this time. A&O x3, calm, and cooperative. Patient denies of pain at this time. Head to toe reassessment completed at this time, see flowsheets for more details. Dopamine titrated up at this time, see MAR. Patient denies no more needs at this time. Son at the bedside. Call light within reach. Chair alarm on. Bed/chair wheels locked. Bed in lowest position.    
Entered patient's room for morning head to toe assessment. Patient resting in the bed at this time. Dr. Dunn and Marva GARG at the bedside at this time.    Patient A&O x3 (person, time, and situation) (patient thought he was at [facility] in [place]), calm, and cooperative. Patient reoriented at this time. Patient denies of pain at this time. Head to toe assessment completed at this time, see flowsheets for more details. Patient denies no more needs at this time. Call light within reach. Bed alarm on. Bed wheels locked. Bed in lowest position.    
I return to room and Myles and Preston are present.  They ask questions about hospice and about him staying at the hospital as they are not sure if they want him transported.  I answer their questions.  I explain about the kidney function and GI bleed.  I educate them about hospice and tell them that I will let them discuss it and I will return to check with them.  Candice Gonzales RN  [facility]   Palliative Care Nurse Coordinator  1/17/2024 9:34 AM    
I talk with son Mlyes about his Dad.  I tell him that his Dad is not doing well and we need to talk about code status.  I ask if he has ever had the conversation with him about end of life and he tells me that he has and that his Dad would not want any heroics at the end of his life.  I discuss the 3 levels of code status with him and he states that he would want to be a DNR-CCA.  The conversation was witnessed by Iliana Chawla RN.  S [facility] updated and order for DNR-CCA placed.  Candice Gonzales RN  [facility]   Palliative Care Nurse Coordinator  1/11/2024 10:04 AM    
I was asked to call his son to update them and ask about his plan of care.  I call Myles and speak with him.  I update him that Jonathan's BUN and Cr and increasing, that IV fluids have been started, but that we need to know how aggressive they want us to be.  We speak of his code status and he still confirms the DNR-CCA status.  He tells me that he does not want his Dad to \"suffer, but some treatment is ok\".  I ask about transferring to a tertiary care center for further treatment and he does not want that.  He tells me that he spoke to his Dad recently and asked about what treatment he wanted and he confirmed that he did not want aggressive treatment.  I update S Samy Carreon CNP on my conversation with Myles.  Candice Gonzales RN  [facility]   Palliative Care Nurse Coordinator  1/16/2024 2:21 PM    
I was asked to see patient and his family about goals of care.  Preston is in the room and Myles is on the phone.  I talk with both of them concerning his change in condition with his kidney functioning worsening and GI bleed symptoms.  We have a discussion and once again they do not want to pursue further treatment.  I talk with them concerning hospice and what services they can provide and answer questions.  They agree to use hospice services and I given them a Freedom of Choice and they choose [facility].  I tell them that I will update them as information comes in.  Candice Gonzales RN  [facility]   Palliative Care Nurse Coordinator  1/17/2024 9:31 AM    
INTENSIVE CARE UNIT   APRN - Progress Note    Patient - Jonathan Iglesias  Date of Admission -  1/10/2024  7:52 AM  Date of Evaluation -  1/12/2024  Hospital Day - 2      SUBJECTIVE:     The Jonathan Iglesias is a 89 y.o. male who is seen for follow up in the ICU.  Per nursing report and notes, overnight events include:  none .  He resting in bed oriented to self, date of birth.  He thinks he is in [facility] in [place].  Off oxygen.  BP stable. HR improved to 64.  Continues to make urine.  BM yesterday      ROS:   Constitutional: negative  for fevers, and negative for chills.  Respiratory: negative for shortness of breath, negative for cough, and negative for wheezing  Cardiovascular: negative for chest pain, and negative for palpitations  Gastrointestinal: negative for abdominal pain, negative for nausea,negative for vomiting, negative for diarrhea, and negative for constipation    All other systems were reviewed with the patient and are negative unless otherwise stated in HPI.      OBJECTIVE:     VITAL SIGNS:  Patient Vitals for the past 8 hrs:   BP Temp Temp src Pulse Resp SpO2 Weight   01/12/24 0645 (!) 106/45 -- -- 56 17 96 % --   01/12/24 0630 (!) 106/37 -- -- 56 18 95 % --   01/12/24 0615 (!) 91/38 -- -- 61 19 91 % --   01/12/24 0600 (!) 91/42 -- -- 64 22 93 % --   01/12/24 0545 (!) 103/45 -- -- 65 21 95 % --   01/12/24 0530 (!) 114/50 -- -- 68 27 (!) 89 % --   01/12/24 0529 -- -- -- -- -- 90 % --   01/12/24 0515 (!) 109/50 97.9 °F (36.6 °C) Temporal 78 18 94 % 75.8 kg (167 lb)   01/12/24 0500 (!) 92/48 -- -- 76 16 96 % --   01/12/24 0445 (!) 98/45 -- -- 75 19 96 % --   01/12/24 0430 (!) 97/45 -- -- 76 18 94 % --   01/12/24 0415 (!) 103/44 -- -- 75 18 94 % --   01/12/24 0400 (!) 98/47 -- -- 78 15 95 % --   01/12/24 0345 (!) 95/47 -- -- 78 18 94 % --   01/12/24 0330 (!) 98/46 -- -- 80 10 94 % --   01/12/24 0315 (!) 104/50 -- -- 76 20 94 % --   01/12/24 0300 (!) 104/51 -- -- 78 22 93 % --   01/12/24 0245 
INTENSIVE CARE UNIT   APRN - Progress Note    Patient - Jonathan Iglesias  Date of Admission -  1/10/2024  7:52 AM  Date of Evaluation -  1/15/2024  Hospital Day - 5      SUBJECTIVE:     The Jonathan Iglesias is a 89 y.o. male who is seen for follow up in the ICU.  Per nursing report and notes, overnight events include:  none .  He sitting up in chair no distress.  No hypoxia. Cooperative.  Nursing reports concerns of dysphagia.  Also reports of confusion      ROS:   Constitutional: negative  for fevers, and negative for chills.  Respiratory: negative for shortness of breath, negative for cough, and negative for wheezing  Cardiovascular: negative for chest pain, and negative for palpitations  Gastrointestinal: negative for abdominal pain, negative for nausea,negative for vomiting, negative for diarrhea, and negative for constipation    All other systems were reviewed with the patient and are negative unless otherwise stated in HPI.      OBJECTIVE:     VITAL SIGNS:  Patient Vitals for the past 8 hrs:   BP Temp Temp src Pulse Resp SpO2 Weight   01/15/24 0700 (!) 110/35 97 °F (36.1 °C) Temporal (!) 46 16 97 % --   01/15/24 0530 (!) 122/46 98.4 °F (36.9 °C) Temporal 51 16 90 % 74.8 kg (165 lb)   01/15/24 0500 (!) 136/52 -- -- 57 19 -- --   01/15/24 0433 -- -- -- -- -- 92 % --   01/15/24 0400 (!) 123/46 -- -- (!) 48 16 -- --   01/15/24 0300 (!) 92/36 -- -- (!) 47 14 92 % --   01/15/24 0200 (!) 110/39 -- -- 50 17 91 % --   01/15/24 0100 (!) 152/59 -- -- 62 20 90 % --         Temp: 97 °F (36.1 °C)  Temp range:    Temp  Av.9 °F (36.6 °C)  Min: 97 °F (36.1 °C)  Max: 98.4 °F (36.9 °C)    BP: (!) 110/35  BP Range:      Systolic (24hrs), Av , Min:92 , Max:152      Diastolic (24hrs), Av, Min:35, Max:74    Pulse: (!) 46  Pulse Range:    Pulse  Av.7  Min: 46  Max: 72    Respirations: 16  Resp Range:   Resp  Av.1  Min: 14  Max: 26    SpO2: 97 % Room Air  SpO2 range:   SpO2  Av.5 %  Min: 90 %  Max: 97 
[facility]  Inpatient/Observation/Outpatient Rehabilitation    Date: 2024  Patient Name: Jonathan Iglesias       [x] Inpatient Acute/Observation       []  Outpatient  : 1934       Plan of Care/Recert ends    [] Pt no showed for scheduled appointment    [x] Pt refused/declined therapy at this time due to:      Family present, p. Sleeping. Family asked to hold therapy AM treat and check back for PM. Would like p. To rest at this time.      [] Pt cancelled due to:  [] No Reason Given   [] Sick/ill   [] Other:    Therapist/Assistant will attempt to see this patient, at our earliest opportunity.       Arielle Rider, PTA Date: 2024    
[facility]   Nephrology progress note  1/12/2024, 8:42 AM      Pt Name:    Jonathan Iglesias  MRN:     [id number]     YOB: 1934  Admit Date:    1/10/2024  7:52 AM    TELEHEALTH EVALUATION -- Audio/Visual (During COVID-19 public health emergency)     Telehealth service was provided with the patient at his room [facility], [facility] and myself the physician in [facility] and the nurse althea has initiated the visit.     Pursuant to the emergency declaration under the House Act and the National Emergencies Act, 1135 waiver authority and the Coronavirus Preparedness and Response Supplemental Appropriations Act, this Virtual  Visit was conducted, with patient's consent, to reduce the patient's risk of exposure to COVID-19 and provide continuity of care for an established patient.     Services were provided through a video synchronous discussion virtually to substitute for in-person clinic visit.    Chief Complaint: Nephrology following for acute kidney injury/hypernatremia.    Subjective:  Patient seen and examined  Continues to be on high flow  Looking so much better .  No cp or SOB.  Not needing any oxygen    Objective:  24HR INTAKE/OUTPUT:    Intake/Output Summary (Last 24 hours) at 1/12/2024 0842  Last data filed at 1/12/2024 0515  Gross per 24 hour   Intake 4048 ml   Output 3600 ml   Net 448 ml        Admission weight: 74.8 kg (165 lb)  Wt Readings from Last 3 Encounters:   01/12/24 75.8 kg (167 lb)   12/31/23 75 kg (165 lb 6.4 oz)   09/25/23 76.4 kg (168 lb 6.4 oz)        Vitals :   Vitals:    01/12/24 0730 01/12/24 0745 01/12/24 0800 01/12/24 0815   BP: (!) 116/43 (!) 119/46 (!) 119/56 (!) 107/47   Pulse: 55 58 71 56   Resp: 19 21 19 23   Temp:       TempSrc:       SpO2: 98% 99% 95% 98%   Weight:       Height:           Physical examination exam performed by the nursing staff  General Appearance: Cachectic ill nourished no distress  Mouth/Throat:  Oral mucosa 
[facility]   Nephrology progress note  1/13/2024, 3:07 PM      Pt Name:    Jonathan Iglesias  MRN:     [id number]     YOB: 1934  Admit Date:    1/10/2024  7:52 AM    TELEHEALTH EVALUATION -- Audio/Visual (During COVID-19 public health emergency)     Telehealth service was provided with the patient at his room [facility], [facility] and myself the physician in [facility] and the nurse, Blanca, who has initiated the visit.     Pursuant to the emergency declaration under the House Act and the National Emergencies Act, 1135 waiver authority and the Coronavirus Preparedness and Response Supplemental Appropriations Act, this Virtual  Visit was conducted, with patient's consent, to reduce the patient's risk of exposure to COVID-19 and provide continuity of care for an established patient.     Services were provided through a video synchronous discussion virtually to substitute for in-person clinic visit.    Chief Complaint: Nephrology following for acute kidney injury/hypernatremia.    Subjective:  Patient seen and examined  He is off dopamine.   On room air.   C/o hiccups.  Good urine output.    Objective:  24HR INTAKE/OUTPUT:    Intake/Output Summary (Last 24 hours) at 1/13/2024 1507  Last data filed at 1/13/2024 1323  Gross per 24 hour   Intake 3417.38 ml   Output 2375 ml   Net 1042.38 ml      Admission weight: 74.8 kg (165 lb)  Wt Readings from Last 3 Encounters:   01/13/24 75.8 kg (167 lb 2 oz)   12/31/23 75 kg (165 lb 6.4 oz)   09/25/23 76.4 kg (168 lb 6.4 oz)        Vitals :   Vitals:    01/13/24 1300 01/13/24 1330 01/13/24 1400 01/13/24 1500   BP: (!) 94/37 (!) 99/40 (!) 101/42 (!) 111/51   Pulse: 62 63 65 63   Resp: 22 13 21 27   Temp:       TempSrc:       SpO2: 97% 96% 96% 95%   Weight:       Height:           Physical examination exam performed by the nursing staff  General Appearance: Cachectic no distress  Mouth/Throat:  Oral mucosa dry  Neck: No accessory muscle 
[facility]   Nephrology progress note  1/14/2024, 1:15 PM      Pt Name:    Jonathan Iglesias  MRN:     [id number]     YOB: 1934  Admit Date:    1/10/2024  7:52 AM    TELEHEALTH EVALUATION -- Audio/Visual (During COVID-19 public health emergency)     Telehealth service was provided with the patient at his room [facility], [facility] and myself the physician in [facility] and the nurse, Blanca, who has initiated the visit.     Pursuant to the emergency declaration under the House Act and the National Emergencies Act, 1135 waiver authority and the Coronavirus Preparedness and Response Supplemental Appropriations Act, this Virtual  Visit was conducted, with patient's consent, to reduce the patient's risk of exposure to COVID-19 and provide continuity of care for an established patient.     Services were provided through a video synchronous discussion virtually to substitute for in-person clinic visit.    Chief Complaint: Nephrology following for acute kidney injury/hypernatremia.    Subjective:  Patient seen and examined.   There was some concern for aspiration this morning he was evaluated by speech and started on thickened liquids.    Objective:  24HR INTAKE/OUTPUT:    Intake/Output Summary (Last 24 hours) at 1/14/2024 1315  Last data filed at 1/14/2024 1135  Gross per 24 hour   Intake 3937.38 ml   Output 2875 ml   Net 1062.38 ml      Admission weight: 74.8 kg (165 lb)  Wt Readings from Last 3 Encounters:   01/14/24 74.9 kg (165 lb 2 oz)   12/31/23 75 kg (165 lb 6.4 oz)   09/25/23 76.4 kg (168 lb 6.4 oz)        Vitals :   Vitals:    01/14/24 0800 01/14/24 0900 01/14/24 1000 01/14/24 1100   BP: (!) 133/59 (!) 121/48 (!) 106/52 (!) 110/37   Pulse: 77 69 66 66   Resp: 20 23 22 21   Temp:    98 °F (36.7 °C)   TempSrc:    Temporal   SpO2: 92% 92% 91% 92%   Weight:       Height:           Physical examination exam performed by the nursing staff  General Appearance: Cachectic no 
[facility]   Nephrology progress note  1/15/2024, 3:18 PM      Pt Name:    Jonathan Iglesias  MRN:     [id number]     YOB: 1934  Admit Date:    1/10/2024  7:52 AM    TELEHEALTH EVALUATION -- Audio/Visual (During COVID-19 public health emergency)     Telehealth service was provided with the patient at his room [facility], [facility] and myself the physician in [facility] and the nurse  who has initiated the visit.     Pursuant to the emergency declaration under the House Act and the National Emergencies Act, 1135 waiver authority and the Coronavirus Preparedness and Response Supplemental Appropriations Act, this Virtual  Visit was conducted, with patient's consent, to reduce the patient's risk of exposure to COVID-19 and provide continuity of care for an established patient.     Services were provided through a video synchronous discussion virtually to substitute for in-person clinic visit.    Chief Complaint: Nephrology following for acute kidney injury/hypernatremia.    Subjective:  Patient seen and examined  Not much communicative  Looking so much better .  Not needing any oxygen    Objective:  24HR INTAKE/OUTPUT:    Intake/Output Summary (Last 24 hours) at 1/15/2024 1518  Last data filed at 1/15/2024 1200  Gross per 24 hour   Intake 1000 ml   Output 2800 ml   Net -1800 ml        Admission weight: 74.8 kg (165 lb)  Wt Readings from Last 3 Encounters:   01/15/24 74.8 kg (165 lb)   12/31/23 75 kg (165 lb 6.4 oz)   09/25/23 76.4 kg (168 lb 6.4 oz)        Vitals :   Vitals:    01/15/24 1000 01/15/24 1100 01/15/24 1200 01/15/24 1324   BP: (!) 101/34 (!) 108/44 (!) 82/36 (!) 119/54   Pulse: 59 61 73 63   Resp: 17 22 23 22   Temp:  97 °F (36.1 °C)  97.6 °F (36.4 °C)   TempSrc:  Temporal  Temporal   SpO2:  97%  94%   Weight:       Height:           Physical examination exam performed by the nursing staff  General Appearance: Cachectic ill nourished no distress  Mouth/Throat:  
[facility]   Nephrology progress note  1/16/2024, 8:37 AM      Pt Name:    Jonathan Iglesias  MRN:     [id number]     YOB: 1934  Admit Date:    1/10/2024  7:52 AM    TELEHEALTH EVALUATION -- Audio/Visual (During COVID-19 public health emergency)     Telehealth service was provided with the patient at his room [facility], [facility] and myself the physician in [facility] and the nurse  who has initiated the visit.     Pursuant to the emergency declaration under the House Act and the National Emergencies Act, 1135 waiver authority and the Coronavirus Preparedness and Response Supplemental Appropriations Act, this Virtual  Visit was conducted, with patient's consent, to reduce the patient's risk of exposure to COVID-19 and provide continuity of care for an established patient.     Services were provided through a video synchronous discussion virtually to substitute for in-person clinic visit.    Chief Complaint: Nephrology following for acute kidney injury/hypernatremia.    Subjective:  Patient seen and examined  Appears much more awake and alert  Not sure whether he is eating and drinking much  Intake only 580 and output 1850    Objective:  24HR INTAKE/OUTPUT:    Intake/Output Summary (Last 24 hours) at 1/16/2024 0837  Last data filed at 1/16/2024 0826  Gross per 24 hour   Intake 580 ml   Output 1850 ml   Net -1270 ml        Admission weight: 74.8 kg (165 lb)  Wt Readings from Last 3 Encounters:   01/16/24 75.5 kg (166 lb 6.4 oz)   12/31/23 75 kg (165 lb 6.4 oz)   09/25/23 76.4 kg (168 lb 6.4 oz)        Vitals :   Vitals:    01/15/24 2354 01/16/24 0355 01/16/24 0525 01/16/24 0713   BP: (!) 115/57   (!) 127/58   Pulse: 68   76   Resp: 24   20   Temp: 96.8 °F (36 °C)   97.1 °F (36.2 °C)   TempSrc: Temporal   Temporal   SpO2: 92%  93% 90%   Weight:  75.5 kg (166 lb 6.4 oz)     Height:           Physical examination exam performed by the nursing staff  General Appearance: 
[facility]    Facility/Department: [facility] ICU    Speech Language Pathology    Dysphagia Treatment  NAME:Jonathan Iglesias    : 1934 (89 y.o.)    MRN: [id number]    ROOM: [address] [place], [place] [address]-    ADMISSION DATE: 1/10/2024    PATIENT DIAGNOSIS(ES): Other specified hypotension [I95.89]  Hypoxemia [R09.02]  Serum sodium elevated [E87.0]  Septicemia (HCC) [A41.9]  Renal dysfunction [N28.9]  Elevated troponin [R79.89]  Severe sepsis (HCC) [A41.9, R65.20]    Chief Complaint   Patient presents with    O2 sat in '80 a NH, covid positive       Patient Active Problem List    Diagnosis Date Noted    Incomplete bladder emptying 2022    CKD (chronic kidney disease) stage 4, GFR 15-29 ml/min ([facility]) 2022    Chronic renal disease, stage III ([facility]) [114017] 2022    Severe sepsis ([facility]) 01/10/2024    Type 2 MI (myocardial infarction) ([facility]) 01/10/2024    Acute kidney injury superimposed on CKD ([facility]) 01/10/2024    Acute respiratory failure with hypoxia ([facility]) 01/10/2024    COVID-19 virus infection - 2024 01/10/2024    Advanced age 01/10/2024    Panlobular emphysema ([facility]) 01/10/2024    NSTEMI (non-ST elevated myocardial infarction) ([facility]) 01/10/2024    Acute hypoxic respiratory failure ([facility]) 01/10/2024    COVID 01/10/2024    Hypernatremia 01/10/2024    Arterial hypotension 01/10/2024    Renal dysfunction 01/10/2024    Septicemia ([facility]) 01/10/2024    Other hypotension 2023    Syncope 2023    Generalized weakness 2023    Rhabdomyolysis 2023    LVH (left ventricular hypertrophy) 10/04/2017    Detrusor instability of bladder 2017    Hyperglycemia 2017    Hyperlipidemia 2017    Chronic renal failure, stage 3 (moderate) ([facility]) 2017    Avulsion fracture 2016    Essential hypertension 2016    Localized osteoarthrosis, lower leg 10/14/2015    ASHD (arteriosclerotic heart disease) 10/14/2015    Insomnia 10/14/2015    Anxiety 10/14/2015    BPH with obstruction/lower 
Maci KELLER showed Marva GARG abnormal rhythm strip - no new orders at this time. Care ongoing. Call light within reach    
Made [facility] aware of the admission.   RESHMA Banks    
Marva GARG called at this time for clarification of the dopamine order and specifications. Recommended to keep the MAP up and titrate the dopamine down as the patient can tolerate, based on vital signs.  
Marva TUCKER notified of critical labs  
[facility]  SPEECH THERAPY  No Visit Note    [] ICU    [x] Acute   Patient: Jonathan Iglesias  Room: 0325/0325-01      Jonathan Iglesias not seen on 1/17/2024 at 9:03 AM due to patient talking to NP and Palliative care nurse. ST will re-attempt at a later time.          Signature: Deanna Rayo M.S. CCC-SLP        
Occupational Therapy  Facility/Department: [facility] MED SURG  Daily Treatment Note  NAME: Jonathan Iglesias  : 1934  MRN: [id number]    Date of Service: 2024    Discharge Recommendations:  Continue to assess pending progress, Subacute/Skilled Nursing Facility         Patient Diagnosis(es): The primary encounter diagnosis was Septicemia (HCC). Diagnoses of Other specified hypotension, Hypoxemia, Renal dysfunction, Serum sodium elevated, Elevated troponin, and COVID-19 virus infection - 2024 were also pertinent to this visit.     Assessment    Assessment: Pt repetatively mumbling throughout therapy session, however would answer simple questions when asked.  Activity Tolerance: Patient limited by fatigue;Patient limited by endurance;Treatment limited secondary to decreased cognition  Discharge Recommendations: Continue to assess pending progress;Subacute/Skilled Nursing Facility      Plan   Occupational Therapy Plan  Times Per Day: Once a day  Days Per Week: 7 Days  Current Treatment Recommendations: Strengthening;ROM;Functional mobility training;Balance training;Safety education & training;Patient/Caregiver education & training;Endurance training;Equipment evaluation, education, & procurement;Self-Care / ADL     Restrictions  Restrictions/Precautions  Restrictions/Precautions: General Precautions;Isolation;Fall Risk    Subjective   Subjective  Subjective: Pt sitting up in bedside chair upon arrival. Pt agreed to participate in therapy session.  Pain: Pt had no complaints of pain.             Objective    Vitals            OT Exercises  Exercise Treatment: Pt completed BUE ther ex x 5 planes x 10 reps x 1 set to increase UE strength and endurance in order to ease completion of ADL tasks. Pt required RBs as needed secondary to fatigue.     Safety Devices  Type of Devices: All fall risk precautions in place;Call light within reach;Chair alarm in place;Left in chair;Nurse notified     Patient 
Occupational Therapy  Facility/Department: [facility] ICU  Daily Treatment Note  NAME: Jonathan Iglesias  : 1934  MRN: [id number]    Date of Service: 2024    Discharge Recommendations:  Continue to assess pending progress, Subacute/Skilled Nursing Facility         Patient Diagnosis(es): The primary encounter diagnosis was Septicemia (HCC). Diagnoses of Other specified hypotension, Hypoxemia, Renal dysfunction, Serum sodium elevated, Elevated troponin, and COVID-19 virus infection - 2024 were also pertinent to this visit.     Assessment    Activity Tolerance: Patient limited by pain;Patient limited by endurance  Discharge Recommendations: Continue to assess pending progress;Subacute/Skilled Nursing Facility      Plan   Occupational Therapy Plan  Times Per Day: Once a day  Days Per Week: 7 Days  Current Treatment Recommendations: Strengthening;ROM;Functional mobility training;Balance training;Safety education & training;Patient/Caregiver education & training;Endurance training;Equipment evaluation, education, & procurement;Self-Care / ADL     Restrictions  Restrictions/Precautions  Restrictions/Precautions: General Precautions;Isolation;Fall Risk    Subjective   Subjective  Subjective: Pt seated in recliner, C/O min chest pain, worsened during therex, Pt dc'd therex  Pain: min chest pain worsening with activity, nursing made aware            Objective    Vitals     OT Exercises  Exercise Treatment: BUE therex to increase strength/endurance for ease of fxl tasks, red digi flex x 20, yellow flex bar x 10 bends Pt dc'd therex because of chest pain     Safety Devices  Type of Devices: All fall risk precautions in place;Call light within reach;Chair alarm in place;Left in chair (son present)  Restraints  Restraints Initially in Place: No     Patient Education  Education Given To: Patient  Education Provided: Role of Therapy;Plan of Care;Home Exercise Program  Education Method: Verbal  Barriers to Learning: 
Occupational Therapy  Facility/Department: [facility] ICU  Daily Treatment Note  NAME: Jonathan Iglesias  : 1934  MRN: [id number]    Date of Service: 2024    Discharge Recommendations:  Continue to assess pending progress, Subacute/Skilled Nursing Facility         Patient Diagnosis(es): The primary encounter diagnosis was Septicemia (HCC). Diagnoses of Other specified hypotension, Hypoxemia, Renal dysfunction, Serum sodium elevated, Elevated troponin, and COVID-19 virus infection - 2024 were also pertinent to this visit.     Assessment    Assessment: Pt and pt's son declined getting pt up to chair d/t fatigue and pt wanting to rest in bed.  Activity Tolerance: Patient limited by endurance  Discharge Recommendations: Continue to assess pending progress;Subacute/Skilled Nursing Facility      Plan   Occupational Therapy Plan  Times Per Day: Once a day  Days Per Week: 7 Days  Current Treatment Recommendations: Strengthening;ROM;Functional mobility training;Balance training;Safety education & training;Patient/Caregiver education & training;Endurance training;Equipment evaluation, education, & procurement;Self-Care / ADL     Restrictions  Restrictions/Precautions  Restrictions/Precautions: General Precautions;Isolation;Fall Risk    Subjective   Subjective  Subjective: Pt lying in bed upon arrival. Pt required encouragement to participate in therapy session.  Pain: Pt had no complaints of pain.           Objective    Vitals              OT Exercises  Exercise Treatment: Pt completed BUE ther ex with x 4 planes x 15 reps x 1 set to increase UE strength and endurance in order to ease completion of ADL tasks. Pt required RBs as needed seondary to fatigue.     Safety Devices  Type of Devices: All fall risk precautions in place;Bed alarm in place;Call light within reach;Left in bed;Nurse notified     Patient Education  Education Given To: Patient  Education Provided: Role of Therapy;Plan of Care  Education Method: 
Occupational Therapy  [facility]  Inpatient/Observation/Outpatient Rehabilitation    Date: 2024  Patient Name: Jonathan Iglesias       [x] Inpatient Acute/Observation       []  Outpatient  : 1934       [] Pt no showed for scheduled appointment    [x] Pt refused/declined therapy at this time due to:      Pt not seen, d/c to hospice.     [] Pt cancelled due to:  [] No Reason Given   [] Sick/ill   [] Other:      TAMMY Medina Date: 2024   
PT O2 between 85-89%. RN made aware. PT mumbling and unable to take deep breaths when asked to. O2 increased to 2.5L  
Palliative care spoke with son Myles regarding DNR and is ok with changing status, Myles unable to come in and sign paper at this time due to having Covid, other son Preston signed paper at this time.  
Patient admitted to  at this time from ER, patient alert and orient x4, denies pain at this time, lung sounds diminished throughout, trace edema noted to bilat lower extremities, noted on heated high flow oxygen, states he is very tired and wants to rest, admission navigator complete, assessment complete, see flow sheet for documentation, repositioned for comfort, call light within reach, care on-going.  
Patient awake and in bed. Denies any pain. Oriented to person. Bed alarm in place. Patient continues to have hiccups but mild compared to yesterday. Lung sounds rhonchi and fine crackles throughout. Heart sounds strong and regular. IV fluids infusing. Pulse oximetry 90-92% on room air. Will continue to monitor.   
Patient back to bed at this time with one assist with walker. Son at bedside. Patient remains confused. Bed alarm in place.   
Patient bathed and turned at this time. Patient with large amount of dark red blood and clots present in brief. Vitals as charted. Arielle Escobar CNP notified of bloody stool.  
Patient complaining of hiccups for last couple hours off and on. Dr. Leung aware. See orders.   
Patient down with radiology for barium swallow. Will be moved to room 325 after test, report given to primary nurse.  
Patient noted to be incontinent of urine and a small stool, hannah-care provided, brief changed, repositioned for comfort, call light within reach, care on-going.  
Patient one assist with walker to chair. Gait weak but steady.   
Patient refusing to get out of bed for PT/OT due to patient wanting to take a nap. Exercises done at bedside.   
Patient resting in bed with eyes closed, awakes easily, alert and orient to self at this time, assessment and vitals complete, see flow sheet for documentation, lung sounds noted with rhonchi throughout, monitor shows NSR, continues on heated high flow, Levo increased to 11 mcg at this time, patient denies pain, no edema noted, repositioned for comfort, call light within reach, care on-going.  
Patient resting in bed, assessment complete, continues with rhonchi throughout lungs, denies SOB, oxygen turned down to 2 liters at this time, spo2 98%, heart rate continues with bradycardia on monitor, repositioned for comfort, call light within reach, care on-going.  
Patient resting in bed, assessment complete, see flow sheet for documentation, patient denies any pain at this time, continues on dopamine and levo at this time, sons continue at bedside, sinus héctor noted on bedside monitor. Repositioned for comfort, call light within reach, care on-going.  
Patient resting in bed. Respirations even and unlabored. Son at bedside.   
Patient returned to bed from chair at his time. Patient ambulated with walker and x2 staff assisst, tolerated well. HS care performed with CHG wash. Patient denies any needs at this time.   
Patient sitting up in bed for breakfast. Denies any pain. Son at bedside.   
Patient spo2 noted 95-97%, removed heated high flow and placed 3 liters nasal cannula on at this time, patient spo2 97%, respiratory notified.  
Patient up to chair with walk. Gait weak and unsteady, tolerated fairly well.   
Patient's son, Myles, called for an update at this time.   
Perfect serve sent to Dr. Calixto  
Perfect serve sent to pulmonology.   
Physical Therapy  Facility/Department: [facility] ICU  Daily Treatment Note  NAME: Jonathan Iglesias  : 1934  MRN: [id number]    Date of Service: 2024    Discharge Recommendations:  Continue to assess pending progress, Subacute/Skilled Nursing Facility, Long Term Care with PT        Patient Diagnosis(es): The primary encounter diagnosis was Septicemia (HCC). Diagnoses of Other specified hypotension, Hypoxemia, Renal dysfunction, Serum sodium elevated, Elevated troponin, and COVID-19 virus infection - 2024 were also pertinent to this visit.    Assessment   Assessment: Pt in chair upon arrival, pleasant and agreeable to exercises in the chair. Pt completes reclined therex BLE x10 in all available planes. Occasional RBs d/t fatigue.  Activity Tolerance: Patient limited by endurance;Patient limited by fatigue     Plan    Physical Therapy Plan  General Plan: 2 times a day 7 days a week (1x/day on weekends)     Restrictions  Restrictions/Precautions  Restrictions/Precautions: General Precautions, Isolation, Fall Risk     Subjective    Subjective  Subjective: Pt in chair upon arrival, pleasant and agreeable to exercises in chair.  Pain: denies     Objective   Vitals     Bed Mobility Training  Bed Mobility Training: No  Transfer Training  Transfer Training: No  Gait Training  Gait Training: No     PT Exercises  Exercise Treatment: Reclined therex BLE x10 in all available planes     Safety Devices  Type of Devices: All fall risk precautions in place;Call light within reach;Nurse notified;Chair alarm in place;Left in chair  Restraints  Restraints Initially in Place: No       Goals  Short Term Goals  Time Frame for Short Term Goals: 20 days  Short Term Goal 1: Patient will ambulate 10' with FWW, CGA, without LOB  Short Term Goal 2: Patient will perform bed mobility and transfers with supervision.  Short Term Goal 3: Patient will tolerate 20-30 minutes of therex/act to improve endurance for ADLs.  Patient Goals   Patient 
Physical Therapy  Facility/Department: [facility] MED SURG  Daily Treatment Note  NAME: Jonathan Iglesias  : 1934  MRN: [id number]    Date of Service: 2024    Discharge Recommendations:  Continue to assess pending progress, Subacute/Skilled Nursing Facility, Long Term Care with PT     Patient Diagnosis(es): The primary encounter diagnosis was Septicemia (HCC). Diagnoses of Other specified hypotension, Hypoxemia, Renal dysfunction, Serum sodium elevated, Elevated troponin, and COVID-19 virus infection - 2024 were also pertinent to this visit.    Assessment   Assessment: Bed mobility: Mod-Max A x2. Transfers:Mod-Max x2. Gait with WW 5ftx1, Min/Mod A x2. short shuffled gait with accelerated solomon, Max A to manuever WW. Mod-Max A to maintain sitting balance  Activity Tolerance: Patient limited by fatigue;Patient limited by endurance;Treatment limited secondary to decreased cognition     Plan    Physical Therapy Plan  General Plan: 2 times a day 7 days a week  Current Treatment Recommendations: Strengthening;ROM;Balance training;Functional mobility training;Transfer training;Endurance training;IADL training;ADL/Self-care training;Gait training;Stair training;Neuromuscular re-education;Manual;Patient/Caregiver education & training;Safety education & training;Home exercise program;Positioning;Modalities;Therapeutic activities     Restrictions  Restrictions/Precautions  Restrictions/Precautions: General Precautions, Isolation, Fall Risk     Subjective    Subjective  Subjective: Pt. in bed upon arrival, mummbling with son present,agreeable to work with therapy. Pts son expressed concern abput pt. being able to participate.  Pain: denies  Orientation  Overall Orientation Status: Impaired     Objective   Bed Mobility Training  Bed Mobility Training: Yes  Overall Level of Assistance: Moderate assistance;Assist X1;Assist X2;Maximum assistance  Interventions: Tactile cues;Verbal cues  Supine to Sit: Maximum 
Physical Therapy  Facility/Department: [facility] MED SURG  Daily Treatment Note  NAME: Jonathan Iglesias  : 1934  MRN: [id number]    Date of Service: 2024    Discharge Recommendations:  Continue to assess pending progress, Subacute/Skilled Nursing Facility, Long Term Care with PT     Patient Diagnosis(es): The primary encounter diagnosis was Septicemia (HCC). Diagnoses of Other specified hypotension, Hypoxemia, Renal dysfunction, Serum sodium elevated, Elevated troponin, and COVID-19 virus infection - 2024 were also pertinent to this visit.    Assessment   Assessment: Pt. ambulated 5ftx1 with WW and Min/ModAx2. Pt. demoed short shuffled gait with accelerated cadance. Max A needed to manuever walker. No LOB noted. Bed Mobility/Transfers: Mod/Max x2. Pt. had LOB while sitting EOB, corrected by therapist.  Activity Tolerance: Patient limited by fatigue;Patient limited by endurance;Treatment limited secondary to decreased cognition     Plan    Physical Therapy Plan  General Plan: 2 times a day 7 days a week  Current Treatment Recommendations: Strengthening;ROM;Balance training;Functional mobility training;Transfer training;Endurance training;IADL training;ADL/Self-care training;Gait training;Stair training;Neuromuscular re-education;Manual;Patient/Caregiver education & training;Safety education & training;Home exercise program;Positioning;Modalities;Therapeutic activities     Restrictions  Restrictions/Precautions  Restrictions/Precautions: General Precautions, Isolation, Fall Risk     Subjective    Subjective  Subjective: Pt. in chair upon arrival, mummbling with both son's present, agreeable to work with therapy at this time. Pts son's expressed concerns about pt. being able to participate and stated that he thinks its a good idea to get him back in bed.  Pain: denies  Orientation  Overall Orientation Status: Impaired     Objective   Bed Mobility Training  Bed Mobility Training: Yes  Overall Level of 
Physical Therapy  Facility/Department: [facility] ICU  Daily Treatment Note  NAME: Jonathan Iglesias  : 1934  MRN: [id number]    Date of Service: 1/15/2024    Discharge Recommendations:  Continue to assess pending progress, Subacute/Skilled Nursing Facility, Long Term Care with PT     Patient Diagnosis(es): The primary encounter diagnosis was Septicemia (HCC). Diagnoses of Other specified hypotension, Hypoxemia, Renal dysfunction, Serum sodium elevated, Elevated troponin, and COVID-19 virus infection - 2024 were also pertinent to this visit.    Assessment   Assessment: Pt. able to ambulate 5ft with WW Mod A x2 for sfety. Pt. required increased v/c and tactile cues with noted short shuffled gait pattern. Bed mboility/ transfers:Max x2 Supine exericses B LE x15 with AAROM, able to perform with tatile and verbal cues. Pt. randomly prays with activty and asked what he is praying about will say \"AMEN.\"  Activity Tolerance: Patient limited by endurance;Patient limited by fatigue     Plan    Physical Therapy Plan  General Plan: 2 times a day 7 days a week  Current Treatment Recommendations: Strengthening;ROM;Balance training;Functional mobility training;Transfer training;Endurance training;IADL training;ADL/Self-care training;Gait training;Stair training;Neuromuscular re-education;Manual;Patient/Caregiver education & training;Safety education & training;Home exercise program;Positioning;Modalities;Therapeutic activities     Restrictions  Restrictions/Precautions  Restrictions/Precautions: General Precautions, Isolation, Fall Risk     Subjective    Subjective  Subjective: Pt. in bed upon arrival sleeping buteasily woken, agreeable to therapy at this time.  Pain: denies  Orientation  Overall Orientation Status: Impaired  Orientation Level: Oriented to place;Oriented to person     Objective   Bed Mobility Training  Bed Mobility Training: Yes  Overall Level of Assistance: Maximum assistance;Assist X2  Interventions: Tactile 
Physical Therapy  Facility/Department: [facility] ICU  Physical Therapy Initial Assessment    Name: Jonathan Iglesias  : 1934  MRN: [id number]  Date of Service: 2024    Discharge Recommendations:  Continue to assess pending progress, Subacute/Skilled Nursing Facility, Long Term Care with PT   PT Equipment Recommendations  Equipment Needed: No      Patient Diagnosis(es): The primary encounter diagnosis was Septicemia (HCC). Diagnoses of Other specified hypotension, Hypoxemia, Renal dysfunction, Serum sodium elevated, Elevated troponin, and COVID-19 virus infection - 2024 were also pertinent to this visit.  Past Medical History:  has a past medical history of Acute renal insufficiency, Acute respiratory failure with hypoxia (HCC), Anxiety, ASHD (arteriosclerotic heart disease), Bipolar disorder (HCC), COVID-19 virus infection - 2024, Essential hypertension, Hyperlipidemia, Hypothyroidism, Localized osteoarthrosis, lower leg, Panlobular emphysema (HCC), and Parkinson's disease.  Past Surgical History:  has a past surgical history that includes Coronary artery bypass graft (2015 ); other surgical history (2015); other surgical history (2015 ); other surgical history (); and Diagnostic Cardiac Cath Lab Procedure (08/17/15).    Assessment   Body Structures, Functions, Activity Limitations Requiring Skilled Therapeutic Intervention: Decreased functional mobility ;Decreased ADL status;Decreased strength;Decreased endurance;Decreased cognition;Decreased balance;Decreased high-level IADLs;Decreased posture  Assessment: The patient is an 89 y.o. male who was admitted due to hypotension and was found to have COVID-19 infection.  He demonstrates decreased LE strength, decreased activity endurance, and impaired seated and standing balance.  He would benefit from skilled PT to address his deficits to improve functional mobility.  Treatment Diagnosis: Generalized weakness  Therapy Prognosis: 
Physical Therapy  Facility/Department: [facility] ICU  Daily Treatment Note  NAME: Jonathan Iglesias  : 1934  MRN: [id number]    Date of Service: 2024    Discharge Recommendations:  Continue to assess pending progress, Subacute/Skilled Nursing Facility, Long Term Care with PT        Patient Diagnosis(es): The primary encounter diagnosis was Septicemia (HCC). Diagnoses of Other specified hypotension, Hypoxemia, Renal dysfunction, Serum sodium elevated, Elevated troponin, and COVID-19 virus infection - 2024 were also pertinent to this visit.    Assessment   Assessment: Transfers: Min x1. Ther-ex: static standing balance with BUE on 2 WW , pt demoed 1x posterior LOB that pt was unable to self correct without therapist aide, vc's were given to tuck hips fwd and stand tall with good carryover, pt stood ~4min before getting tired, seated and supine BLE x15 in all available planes.  Activity Tolerance: Patient tolerated treatment well  Equipment Needed: No     Plan    Physical Therapy Plan  General Plan: 2 times a day 7 days a week (1x/day on weekends and holidays)  Current Treatment Recommendations: Strengthening;ROM;Balance training;Functional mobility training;Transfer training;Endurance training;IADL training;ADL/Self-care training;Gait training;Stair training;Neuromuscular re-education;Manual;Patient/Caregiver education & training;Safety education & training;Home exercise program;Positioning;Modalities;Therapeutic activities     Restrictions  Restrictions/Precautions  Restrictions/Precautions: General Precautions, Isolation, Fall Risk     Subjective    Subjective  Subjective: Pt in chair upon arrival, agreeable to therapy  Pain: denies pain complaint but family states he has back pain but will not admit  Orientation  Overall Orientation Status: Within Functional Limits  Cognition  Overall Cognitive Status: WFL     Objective        Bed Mobility Training  Bed Mobility Training: No  Transfer Training  Transfer 
Physical Therapy  Facility/Department: [facility] ICU  Daily Treatment Note  NAME: Jonathan Iglesias  : 1934  MRN: [id number]    Date of Service: 2024    Discharge Recommendations:  Continue to assess pending progress, Subacute/Skilled Nursing Facility, Long Term Care with PT        Patient Diagnosis(es): The primary encounter diagnosis was Septicemia (HCC). Diagnoses of Other specified hypotension, Hypoxemia, Renal dysfunction, Serum sodium elevated, Elevated troponin, and COVID-19 virus infection - 2024 were also pertinent to this visit.    Assessment   Assessment: Pt in bed upon arrival with son and pt declining OOB activities at this time d/t fatigue but agreeable to therex. Pt completes supine therex BLE x10 in all available planes. Tx limited d/t decreased endurance and fatigue.  Activity Tolerance: Patient limited by endurance;Patient limited by fatigue     Plan    Physical Therapy Plan  General Plan: 2 times a day 7 days a week (1x/day on weekends)     Restrictions  Restrictions/Precautions  Restrictions/Precautions: General Precautions, Isolation, Fall Risk     Subjective    Subjective  Subjective: Pt in bed upon arrival with son present, both son and pt decline OOB activities at this time d/t pt fatigue but agreeable to therex in bed.  Pain: denies     Objective   Vitals     Bed Mobility Training  Bed Mobility Training: No  Transfer Training  Transfer Training: No  Gait Training  Gait Training: No     PT Exercises  Exercise Treatment: supine therex BLE x10 in all available planes     Safety Devices  Type of Devices: All fall risk precautions in place;Bed alarm in place;Call light within reach;Left in bed;Nurse notified       Goals  Short Term Goals  Time Frame for Short Term Goals: 20 days  Short Term Goal 1: Patient will ambulate 10' with FWW, CGA, without LOB  Short Term Goal 2: Patient will perform bed mobility and transfers with supervision.  Short Term Goal 3: Patient will tolerate 20-30 minutes 
Physical Therapy  Facility/Department: [facility] MED SURG  Daily Treatment Note  NAME: Jonathan Iglesias  : 1934  MRN: [id number]    Date of Service: 1/15/2024    Discharge Recommendations:  Continue to assess pending progress, Subacute/Skilled Nursing Facility, Long Term Care with PT     Patient Diagnosis(es): The primary encounter diagnosis was Septicemia (HCC). Diagnoses of Other specified hypotension, Hypoxemia, Renal dysfunction, Serum sodium elevated, Elevated troponin, and COVID-19 virus infection - 2024 were also pertinent to this visit.    Assessment   Assessment: Pt. ambulated from chair to bed with WW 5ftx1 with MinMod A x1-2 for safety. Noted quck shuffling gait pattern. Transfers from chair : Min A x1-2. Bed mobility:Min/Mod A x1-2. Seated exercises B LE x10  Activity Tolerance: Patient limited by endurance;Patient limited by fatigue;Patient tolerated treatment well     Plan    Physical Therapy Plan  General Plan: 2 times a day 7 days a week  Current Treatment Recommendations: Strengthening;ROM;Balance training;Functional mobility training;Transfer training;Endurance training;IADL training;ADL/Self-care training;Gait training;Stair training;Neuromuscular re-education;Manual;Patient/Caregiver education & training;Safety education & training;Home exercise program;Positioning;Modalities;Therapeutic activities     Restrictions  Restrictions/Precautions  Restrictions/Precautions: General Precautions, Isolation, Fall Risk     Subjective    Subjective  Subjective: Pt. up in chair upon arrival with family present, agreeable to work with therapy and get back to bed at this time.  Pain: denies  Orientation  Overall Orientation Status: Impaired  Orientation Level: Oriented to place;Oriented to person     Objective   Bed Mobility Training  Bed Mobility Training: Yes  Overall Level of Assistance: Moderate assistance;Assist X1;Assist X2  Interventions: Tactile cues;Verbal cues  Rolling: Maximum 
Pt confused at this time, trying to pull monitor wires off, pull at stiles catheter tubing and trying to get out of bed. Side rails up x 3. Bed alarm on. Call light in reach. Will continue to monitor.   
Pt in bed resting. VS and assessment as charted. Pt is alert and oriented only to person and place, disoriented to time and situation. Sips of drink provided and patient denies any other needs at this time. Call light within reach and bed alarm in place for additional safety. Will continue to monitor.   
Pt resting in bed awake. Assessment and vital signs as charted. Pt denies pain at this time. Pt is A & O x4. IV fluids infusing as ordered. Cardiac monitor shows NSR. HHFNC in use at 45L and 57% FiO2. Pt incontinent of urine and stool, brief changed and hannah care done. Zinc paste applied to groin and coccyx. Stool sample sent to lab. Pt denies any other needs. Call light in reach. Bed alarm on. Will continue to monitor.   
Pt resting in bed quietly with eyes closed. Respirations even and unlabored. No distress noted. Assessment and vital signs as charted. Pt denies pain and continues to be alert and oriented to self only. Rivero catheter remains intact, draining and patent. Pt denies any other needs. Call light in reach. Bed alarm on. Will continue to monitor.   
Pt resting in bed quietly with eyes closed. Respirations even and unlabored. No distress noted. Pt wakes easily to verbal stimuli. Assessment and vital signs as charted. Pt denies pain at this time. Pt is A & O to person only and disoriented to place, time and situation. Cardiac monitor continues to show NSR. Levo continues to infuse per order. IV fluids infusing per order. Pt denies any other needs. Call light in reach. Bed alarm on. Will continue to monitor.   
Pt resting in bed watching TV. Assessment and vital signs as charted. Pt denies pain. Pt is alert and oriented to self only. Cardiac monitor shows NSB. Continuous pulse ox WNL on room air. Rivero catheter in place, draining and patent. Pt denies any other needs. Call light in reach. Bed alarm on. Will continue to monitor.   
Pt resting in bed with eyes closed. Respirations even and unlabored. No distress noted. Pt wakes easily to verbal stimuli. Assessment and vital signs as charted. Pt denies pain at this time. Pt continues to be alert and oriented to self only. Cardiac monitor shows NSR. IV medications and fluids are infusing as ordered. Rivero catheter remains intact, draining and patent. Bed alarm on. Call light in reach. Will continue to monitor.   
Pt resting in bed with eyes closed. Respirations even and unlabored. No distress noted. Pt wakes easily to verbal stimuli. Assessment and vital signs as charted. Pt denies pain. Pt is A & O to self only. Cardiac monitor continues to show NSB. Continuous pulse ox remains WNL on room air. Rivero catheter remains intact, draining and patent. Pt denies any other needs. Call light in reach. Bed alarm on. Will continue to monitor.   
Pt resting in bed with eyes closed. Respirations even and unlabored. No distress noted. Pt wakes easily to verbal stimuli. Assessment and vital signs as charted. Pt denies pain. Pt is A & O x 2, person and place only. Pt is disoriented to time and situation. HHFNC remains in use. IV fluids infusing as ordered. Cardiac monitor remains NSR. Continuous pulse ox remains WNL. Brief changed. Pt denies any other needs. Call light in reach. Bed alarm on. Will continue to monitor.   
Pt resting in bed with eyes closed. Respirations even and unlabored. No distress noted. Pt wakes easily to verbal stimuli. Son, Preston, at bedside. Assessment and vital signs as charted. Pt denies pain. Pt is alert and oriented to self only. Cardiac monitor shows NSB. IV fluid and medications are infusing as ordered. Rivero catheter intact, draining and patent. Nasal canula on at 1 L. Continuous pulse ox WNL. Pt denies any other needs at this time. Call light in reach. Side rails up x 3. Bed alarm on. Will continue to monitor.   
Pulse oximetry 86-87% at rest on room air. Patient removed heated high flow. Oxygen placed back on patient 50 liters at 65% FIO2.   
RESPIRATORY ASSESSMENT PROTOCOL                                                                                              Patient Name: Jonathan Iglesias Room#: 0325/0325-01 : 1934     Admitting diagnosis: Other specified hypotension [I95.89]  Hypoxemia [R09.02]  Serum sodium elevated [E87.0]  Septicemia (HCC) [A41.9]  Renal dysfunction [N28.9]  Elevated troponin [R79.89]  Severe sepsis (HCC) [A41.9, R65.20]       Medical History:   Past Medical History:   Diagnosis Date    Acute renal insufficiency 10/05/2015    Acute respiratory failure with hypoxia (HCC) 01/10/2024    Anxiety 10/14/2015    ASHD (arteriosclerotic heart disease) 10/14/2015    Bipolar disorder ([facility]) 1976    COVID-19 virus infection - 2024 01/10/2024    Essential hypertension 2016    Hyperlipidemia     Hypothyroidism     Localized osteoarthrosis, lower leg 10/14/2015    Panlobular emphysema ([facility]) 01/10/2024    Parkinson's disease     light,        PATIENT ASSESSMENT    LABORATORY DATA  Hematology:   Lab Results   Component Value Date/Time    WBC 7.1 2024 04:05 AM    RBC 3.35 2024 04:05 AM    RBC 5.02 2012 10:27 AM    HGB 11.0 2024 04:05 AM    HCT 33.6 2024 04:05 AM     2024 04:05 AM     2012 10:27 AM     Chemistry:    Lab Results   Component Value Date/Time    PHART 7.445 01/10/2024 12:45 PM    LRU1YMM 29.8 01/10/2024 12:45 PM    PO2ART 53.2 01/10/2024 12:45 PM    O3FJQANV 89.4 01/10/2024 12:45 PM    UVF2SHJ 20.0 01/10/2024 12:45 PM    NBEA 2.7 01/10/2024 12:45 PM       VITALS  Pulse: 62   Respirations: 22  BP: (!) 127/58  SpO2: 92 % O2 Device: None (Room air)  Temp: 97.1 °F (36.2 °C)    SKIN COLOR  [x] Normal  [] Pale  [] Dusky  [] Cyanotic    RESPIRATORY PATTERN  [x] Normal  [] Dyspnea  [] Cheyne-Jonas  [] Kussmaul  [] Biots    AMBULATORY  [] Yes  [] No  [x] With Assistance    PEAK FLOW  Predicted:     Personal Best:            Patient Acuity 0 1 2 3 4 Score   Level of 
RESPIRATORY ASSESSMENT PROTOCOL                                                                                              Patient Name: Jonathan Iglesias Room#: I305/I305-01 : 1934     Admitting diagnosis: Other specified hypotension [I95.89]  Hypoxemia [R09.02]  Serum sodium elevated [E87.0]  Septicemia (HCC) [A41.9]  Renal dysfunction [N28.9]  Elevated troponin [R79.89]  Severe sepsis (HCC) [A41.9, R65.20]       Medical History:   Past Medical History:   Diagnosis Date    Acute renal insufficiency 10/05/2015    Acute respiratory failure with hypoxia (HCC) 01/10/2024    Anxiety 10/14/2015    ASHD (arteriosclerotic heart disease) 10/14/2015    Bipolar disorder ([facility]) 1976    COVID-19 virus infection - 2024 01/10/2024    Essential hypertension 2016    Hyperlipidemia     Hypothyroidism     Localized osteoarthrosis, lower leg 10/14/2015    Panlobular emphysema (Formerly [facility]) 01/10/2024    Parkinson's disease     light,        PATIENT ASSESSMENT    LABORATORY DATA  Hematology:   Lab Results   Component Value Date/Time    WBC 4.2 01/10/2024 08:35 AM    RBC 4.56 01/10/2024 08:35 AM    RBC 5.02 2012 10:27 AM    HGB 15.1 01/10/2024 08:35 AM    HCT 47.9 01/10/2024 08:35 AM     01/10/2024 08:35 AM     2012 10:27 AM     Chemistry:    Lab Results   Component Value Date/Time    PHART 7.445 01/10/2024 12:45 PM    FMP7DDN 29.8 01/10/2024 12:45 PM    PO2ART 53.2 01/10/2024 12:45 PM    X0MTKIYX 89.4 01/10/2024 12:45 PM    EFD8QLK 20.0 01/10/2024 12:45 PM    NBEA 2.7 01/10/2024 12:45 PM       VITALS  Pulse: 59   Respirations: 18  BP: (!) 125/47  SpO2: 99 % O2 Device: Heated high flow cannula  Temp: 97.7 °F (36.5 °C)    SKIN COLOR  [x] Normal  [] Pale  [] Dusky  [] Cyanotic    RESPIRATORY PATTERN  [x] Normal  [] Dyspnea  [] Cheyne-Jonas  [] Kussmaul  [] Biots    AMBULATORY  [] Yes  [x] No  [] With Assistance        Patient Acuity 0 1 2 3 4 Score   Level of Consciousness (LOC) [x]  Alert & 
RN called writer and stated pt's SpO2 was doing good on low heated high flow settings. RN asked about trying pt on nasal cannula. Writer stated to try, and place pt on 3 lpm. RN stated she would removed heated high flow and place pt on 3 lpm nasal cannula to see how pt tolerates. Will continue to monitor.  
[facility]  SPEECH THERAPY  No Visit Note    [] ICU    [x] Acute   Patient: Jonathan Iglesias  Room: 0325/0325-01      Jonathan Iglesias not seen on 1/16/2024 at 11:06 AM due to patient not appropriate for therapy. Patient seen after transfer to chair with PT. Patient mumbling to himself and attempting to talk when eating. Patient does answer some questions appropriately, but his eye remain closed. RN reports patient tolerated breakfast without s/sx of asp/pen, but was unable to take larger pill whole in puree (unable to be crushed). ST will re-attempt treatment at our earliest convenience.         Signature: Deanna Rayo M.S. CCC-SLP        
Sarwat Bueno called and verbal consent received for PICC placement.  
Son Myles updated.   
Son at bedside.  
Son called and updated on patient.  
Speech at bedside for eval. Patient has been coughing with liquids.   
Spiritual Services Interventions  0325/0325-01   1/17/2024  Marquita Castillo EDSON Cariassheridan  89 y.o. year old male    Encounter Summary  Encounter Overview/Reason : (P) Spiritual/Emotional Needs  Service Provided For:: (P) Patient  Referral/Consult From:: (P) Rounding  Support System: Children  Last Encounter : (P) 01/17/24  Complexity of Encounter: (P) Moderate  Begin Time: (P) 1400  End Time : (P) 1410  Total Time Calculated: (P) 10 min           Grief, Loss, and Adjustments  Type: (P) Hospice           Advance Care Planning  Type: ACP conversation (pt was awake for a couple of minutes, then fell asleep. Son shared he was the pt's healthcare POA. I left the ACP docs with son for him to look over Living Will document. Told him to have nurse or doctor reach out when ready to fill out.)  Assessment/Intervention/Outcome  Assessment: (P) Other (Comment) (sleeping)  Intervention: (P) Prayer (assurance of)/[place]  Outcome: (P) Did not respond     
Spiritual Services Interventions  I305/I305-01   1/12/2024  Crystal Iglesias  89 y.o. year old male    Encounter Summary  Encounter Overview/Reason : (P) Attempted Encounter  Service Provided For:: (P) Patient  Referral/Consult From:: (P) Nurse (per Spiritual Care Consult order)  Support System: (P) Unknown  Last Encounter : (P) 01/12/24 (pt sleeping)  Complexity of Encounter: (P) Low  Begin Time: (P) 0930  End Time : (P) 0945  Total Time Calculated: (P) 15 min                       Advance Care Planning  Type: (P) ACP conversation (Purpose of attempted encounter was to discuss ACP docs)  Assessment/Intervention/Outcome  Assessment: (P) Other (Comment) (pt sleeping)  Intervention: (P) Prayer (assurance of)/[place]  Outcome: (P) Other (comment) (pt sleeping)     
Spiritual Services Interventions  I305/I305-01 1/12/2024  Crystal Hanna    Jonathan Cariassheridan  89 y.o. year old male    Encounter Summary  Encounter Overview/Reason : (P) Initial Encounter  Service Provided For:: (P) Patient and family together (pt was awake for a couple minutes and then dozed off to sleep. His youngest son was present, I talked with him for a bit about ACP docs and prayed with him.)  Referral/Consult From:: (P) Nurse (Nurse per Spiritual Care Consult)  Support System: (P) Children  Last Encounter : (P) 01/12/24 (pt awake for a couple minutes, spoke with son)  Complexity of Encounter: (P) Moderate  Begin Time: (P) 1330  End Time : (P) 1345  Total Time Calculated: (P) 15 min                       Advance Care Planning  Type: (P) ACP conversation (pt was awake for a couple of minutes, then fell asleep. Son shared he was the pt's healthcare POA. I left the ACP docs with son for him to look over Living Will document. Told him to have nurse or doctor reach out when ready to fill out.)  Assessment/Intervention/Outcome  Assessment: (P) Calm, Coping, Tearful (pt appeared calm for the couple minutes he was awake, spoke with son, son teary eyed and expressed gratitude for prayer)  Intervention: (P) Active listening, Sustaining Presence/Ministry of presence, Prayer (assurance of)/[place], Discussed belief system/Zoroastrian practices/lesli  Outcome: (P) Encouraged, Engaged in conversation     
Spo2 noted to be 100% heated high flow decrease at this time, patient on 35 FIO2 and 35 liters, respiratory notified.  
[facility]  Inpatient/Observation/Outpatient Rehabilitation    Date: 2024  Patient Name: Jonathan Iglesias       [x] Inpatient Acute/Observation       []  Outpatient  : 1934     Plan of Care/Recert ends    [] Pt no showed for scheduled appointment    [] Pt refused/declined therapy at this time due to:           [x] Pt cancelled due to:  [] No Reason Given   [] Sick/ill   [] Other: Spoke with Marva NP, pt. Going hospice and therapy orders being discontinued.     Therapist/Assistant will attempt to see this patient, at our earliest opportunity.       Arielle Rider, PTA Date: 2024      
Ultrasound at bedside  
Writer at bedside, vitals and assessment as charted. Patient sitting up in chair a/o, confusion noted to year. Patient denies pain or needs at this time. Call light and bedside table within reach.   
Writer called CAITLIN Guzmán regarding low BP's. See order for fluid bolus.     
Writer called [facility] to initiate PICC line placement. RN will be contacting ICU staff in am around 0700. Will pass along in report.   
Writer present, vitals and assessment as charted. Patient denies pain, c/o hiccups states is just annoying. Patient denies any needs at this time. Call light and bedside table within reach.  
Writer present, vitals and assessment as charted. Patient in bed, alert to self and place. Patient answers questions, but will continue to mumble to self. Denies any needs at this time. Call light and bedside table within reach.  
Writer to bedside to complete morning assessment. Upon entry to room, pt awake and lying in bed chanting repeatedly, respirations even and unlabored while on room air. Vitals obtained and assessment completed, see flow sheet for details. Rivero catheter care completed, brief changed, zinc paste applied. Pt denies needs from writer at this time. Call light in reach. Care ongoing.    
Writer to bedside to complete morning assessment. Upon entry to room, pt quiet, eyes closed and lying in bed, respirations tachypneic and shallow and SpO2 at 85% - initiated oxygen therapy at 3 L/min. Vitals obtained and assessment completed, see flow sheet for details. Pt denies needs from writer at this time. Call light in reach. Care ongoing.    
Writer to room, vitals and assessment as charted. Patient sitting up in chair, a/o with only confusion to year at this time. Patient denies offer to return to bed at this time, agrees to be washed up and return to bed later. Patient denies any pain or further needs. Call light and bedside table within reach. Care ongoing.   
None  Education Outcome: Verbalized understanding    Goals  Short Term Goals  Time Frame for Short Term Goals: 21 visits  Short Term Goal 1: Patient to complete ADL transfers c SBA to improve ADL independence and safety.  Short Term Goal 2: Patient to engage in 15 minutes of ther ex/ther act to improve strength and activity tolerance for ADL.  Short Term Goal 3: Patient to complete UB/LB self care with use of AE/DME as needed c SBA to improve ADL independence.  Short Term Goal 4: Patient to complete toileting, grooming and hygiene with SUP to improve ADL independence.    AM-PAC - ADL       Therapy Time   Individual Concurrent Group Co-treatment   Time In 0918         Time Out 0936         Minutes 18                 LONA Lundberg/HENRY     
X2  Interventions: Tactile cues;Verbal cues;Safety awareness training  Sit to Stand: Moderate assistance;Assist X2;Minimum assistance  Stand to Sit: Moderate assistance;Assist X2;Minimum assistance  Gait Training  Gait Training: Yes  Gait  Overall Level of Assistance: Minimum assistance;Moderate assistance;Assist X1;Assist X2  Distance (ft): 5 Feet  Assistive Device: Walker, rolling  Interventions: Demonstration;Safety awareness training;Verbal cues  Base of Support: Narrowed  Speed/Raquel: Shuffled;Accelerated  Step Length: Left shortened;Right shortened  Gait Abnormalities: Decreased step clearance;Shuffling gait        OT Exercises  Exercise Treatment: Pt completed BUE ther ex x 4 planes x 10 reps x 1 set to increase UE strength and endurance in order to ease completion of ADL tasks. Pt required RBs as needed secondary to fatigue.     Safety Devices  Type of Devices: All fall risk precautions in place;Call light within reach;Nurse notified;Bed alarm in place;Left in bed     Patient Education  Education Given To: Patient  Education Provided: Role of Therapy;Plan of Care  Education Method: Verbal  Barriers to Learning: None  Education Outcome: Verbalized understanding    Goals  Short Term Goals  Time Frame for Short Term Goals: 21 visits  Short Term Goal 1: Patient to complete ADL transfers c SBA to improve ADL independence and safety.  Short Term Goal 2: Patient to engage in 15 minutes of ther ex/ther act to improve strength and activity tolerance for ADL.  Short Term Goal 3: Patient to complete UB/LB self care with use of AE/DME as needed c SBA to improve ADL independence.  Short Term Goal 4: Patient to complete toileting, grooming and hygiene with SUP to improve ADL independence.    AM-PAC - ADL       Therapy Time   Individual Concurrent Group Co-treatment   Time In 1415         Time Out 1436         Minutes 21                 TAMMY Osullivan     
use  Lungs:  Breath sounds: Clear per nursing staff  Heart::  S1,S2 heard  Abdomen:  Soft, non - tender  Musculoskeletal:  Edema -no edema    Medications:  Infusion:    norepinephrine 11 mcg/min (01/11/24 0646)    sodium chloride      sodium chloride      dextrose 125 mL/hr at 01/10/24 2137     Meds:    lidocaine 1 % injection  5 mL IntraDERmal Once    sodium chloride flush  5-40 mL IntraVENous 2 times per day    cefepime  2,000 mg IntraVENous Once    sodium chloride flush  5-40 mL IntraVENous 2 times per day    ipratropium 0.5 mg-albuterol 2.5 mg  1 Dose Inhalation 4x Daily RT    dextromethorphan-guaiFENesin  1 tablet Oral BID    amiodarone  200 mg Oral Daily    aspirin  81 mg Oral Daily    atorvastatin  20 mg Oral Daily    buPROPion  100 mg Oral BID    finasteride  5 mg Oral Daily    levothyroxine  88 mcg Oral Daily    midodrine  5 mg Oral BID WC    multivitamin  1 tablet Oral Daily    polyethylene glycol  17 g Oral Daily    tamsulosin  0.4 mg Oral Daily    vitamin C  1,000 mg Oral BID    enoxaparin  1 mg/kg (Adjusted) SubCUTAneous Daily    dexAMETHasone  6 mg Oral Daily    remdesivir 100 mg in sodium chloride 0.9 % 250 mL IVPB  100 mg IntraVENous Q24H       Lab Data :  CBC:   Recent Labs     01/10/24  0835   WBC 4.2   HGB 15.1   HCT 47.9        CMP:  Recent Labs     01/10/24  0835 01/10/24  1445 01/11/24  0517   * 152* 143   K 4.9 4.9 3.9   * 118* 114*   CO2 23 22 19*   BUN 43* 42* 48*   CREATININE 2.9* 3.1* 2.8*   GLUCOSE 89 113* 189*   CALCIUM 9.3 8.4* 8.3*     Hepatic:   Recent Labs     01/10/24  0835 01/11/24  0517   LABALBU 3.3* 2.4*   AST 66* 57*   ALT 69* 45*   BILITOT 0.5 0.3   ALKPHOS 138* 86       Assessment and Plan:  Renal -acute kidney injury-most likely secondary to prerenal etiology patient clinically looks dry also hyper natremia as well associated with some hypotension on presentation  Currently making some urine continue IV fluids  He is also hypotensive and the sodium is 
Assessment in 24 hrs Total Acuity:  4-8  []  Secondary Assessment in 24 hrs Total Acuity:  0-3  []  Secondary Assessment in 48 hrs   HHN AEROSOL THERAPY with  [physician-ordered bronchodilator(s)] q 4 & Albuterol PRN q2 hrs.   Breath-Actuated Neb if BBS Acuity = 4, and pt. can use MP. Notify physician if condition deteriorates.  HHN AEROSOL THERAPY with  [physician-ordered bronchodilator(s)]  QID and Albuterol PRN q4 hrs.   Breath-Actuated Neb if BBS Acuity = 4, and pt. can use MP.  Notify physician if condition deteriorates. MDI THERAPY with  2 actuations of [physician-ordered bronchodilator(s)] via spacer TID Albuterol and PRN q4 hrs.   If unable to utilize MDI: HHN [physician-ordered bronchodilator(s)] TID and Albuterol PRN q4 hrs.   Notify physician if condition deteriorates. MDI THERAPY with  [physician-ordered bronchodilator(s)] via spacer TID PRN.   If unable to utilize MDI: HHN [physician-ordered bronchodilator(s)] TID PRN.   Notify physician if condition deteriorates.         If Acuity Level is 2, 3, or 4 in any of the following:    [] COUGH     [] SURGICAL HISTORY (SURG HX)  [x] CHEST XRAY (CXR)    Goal: Improvement in sputum mobilization in patients with ineffective airway clearance. Reverse atelectasis.    [x] Bronchopulmonary Hygiene Protocol      Total Acuity:   14-28  []  Secondary Assessment in 24 hrs Total Acuity:  9-13  [x]  Secondary Assessment in 24 hrs Total Acuity:  4-8  []  Secondary Assessment in 24 hrs Total Acuity:  0-3  []  Secondary Assessment in 48 hrs   METANEB QID with [physician-ordered bronchodilator(s)] if CXR Acuity = 4; otherwise:  PD&P, Oscillatory Therapy, or Vest QID & PRN AND PEP QID & PRN  NT Sxn PRN for ineffective cough  METANEB QID with [physician-ordered bronchodilator(s)] if CXR Acuity = 4; otherwise:  PD&P, Oscillatory Therapy or Vest QID & PRN AND PEP QID & PRN  NT Sxn PRN for ineffective cough  PD&P, Oscillatory Therapy, or Vest TID & PRN AND PEP TID & PRN   Instruct 
technology  
  Time In 0935         Time Out 0950         Minutes 15                 Chantelle Bhat, OTR/L     
Long-term Goals: 14 days  Goal 1: Patient will tolerate safest, least restrictive diet with no overt s/sx of aspiration/penetration in 90% of trials.    Safety Devices    Safety Devices  Restraints Initially in Place: No    Pain Assessment    Pain Assessment: Patient does not c/o pain    Pain Re-assessment    Pain Reassessment: Patient does not c/o pain    Therapy Time    SLP Individual Minutes  Time In: 0910  Time Out: 0935  Minutes: 25       Patient seen seated upright in chair for dysphagia treatment this date. RN reports nereida was coughing with most all of his breakfast this AM with exception of oatmeal. RN also stating patient had difficulty with mixed consistency foods (I.e. fruit in juice). Patient appears to be more confused than during initial evaluation previous date. Patient unable to correctly identify birthday and states \"and amen\" after every swallow as well as \"she's doing her best.\" Patient unable to clarify.     Upon entry to room, drink at patient's bedside appeared to be mildly-thick vs. Moderately-thick. Drink thickened to moderately-thick. Patient presents with mild oral phase dysphagia characterized by slightly prolonged mastication. Patient with functional lingual and labial musculature and no oral residues remaining post-swallow with puree and banana consistencies. 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 Patient demonstrated no overt s/sx of aspiration or penetration with any consistencies trialed this date.   SLP recommends diet of minced and moist solids (no mixed consistencies) with moderately thick liquids (NO STRAWS). SLP recommends MBSS be completed to objectively determine safest, least restrictive diet. SLP to follow daily during inpatient stay.  Continue to ensure correct consistency of liquid.    Electronically signed: Deanna Rayo M.S., CCC-SLP               1/15/2024  
pharyngeal phase dysphaiga. Patient with functional hyolaryngeal elevation upon palpation. Patient presented trials of easy to chew solids, soft and bite sized solids, and puree solids with no overt s/sx of aspiration/penetration on all trials. Patient presented thin liquid via cup and straw and mildly thick liquid via cup. Patient with immediate wet cough on all trials of thin and mildly thick liquids. Patient presented moderately thick liquids via cup. Patient with no overt s/sx of aspiration/penetration with moderately thick liquids via cup. No change in vocal quality noted with moderately thick liquids. SLP educated patient on the following strategies: Alternate solids and liquids;Small bites/sips;Upright as possible for all oral intake;No straws;Remain upright for 30-45 minutes after meals    SLP recommends diet of easy to chew solids with moderately thick liquids (NO STRAWS). SLP recommends MBSS be completed to objectively determine safest, least restrictive diet. SLP to follow daily during inpatient stay.     Electronically signed: Laura Hale M.A., JUSTINO-SLP             1/14/2024    
stable  Treatment plan:   Appreciate cardiology  Trend troponin troponin trending downward  Imaging: Repeat echocardiogram  Echocardiogram 12/29/2023-EF 55 to 60%.  Normal wall motion.  Abnormal diastolic function.  Mild sclerosis of aortic valve cusp.  Trace tricuspid regurgitation.  Medications:   Continue aspirin, Lipitor     COVID-19  Condition is stable  Treatment plan: Appreciate infectious disease  Imaging: no further imaging studies ordered today  Medications: Deferred to ID  IV remdesivir  Actemra-pending  Paxlovid out of the window     MARYLU on CKD  Condition is improving  Treatment plan: Continue current treatment, monitor labs, strict I/O  Imaging: Renal ultrasound ordered today  Medications: IV fluids     Nutrition status:   at risk for malnutrition  Dietician consult initiated     Hospital Prophylaxis:   DVT: Lovenox   Stress Ulcer:  na       Disposition:  Shared decision making: All test results, treatment options and disposition options were discussed with the patient today  Social determinants of health that may impact management: none  Code status: Full Code   Disposition: Discharge plan is pending        Critical Care Time:  Total critical care time caring for this patient with life threatening, unstable organ failure, including direct patient contact, management of life support systems, review of data including imaging and labs, discussions with other team members and physicians at least 0 minutes so far today, excluding separately billable procedures.    MIPS Advanced Care Planning documentation:  [x] I have confirmed that the patient's Advance Care Plan is present, Code Status is documented, or surrogate decision maker is listed in the patient's medical record  [If \"yes\", STOP HERE]     [] The patient's Advance Care Plan is NOT present because:    []  I confirmed today that the patient does not wish or was not able to name a   surrogate decision maker or provide and advance care plan.    []

## 2024-01-18 PROBLEM — Z51.5 ENCOUNTER FOR HOSPICE CARE: Status: ACTIVE | Noted: 2024-01-18

## 2024-01-18 PROCEDURE — 1250000000 HC SEMI PRIVATE HOSPICE R&B

## 2024-01-18 PROCEDURE — 6360000002 HC RX W HCPCS: Performed by: NURSE PRACTITIONER

## 2024-01-18 PROCEDURE — 6370000000 HC RX 637 (ALT 250 FOR IP): Performed by: NURSE PRACTITIONER

## 2024-01-18 RX ADMIN — ATROPINE SULFATE 3 DROP: 10 SOLUTION/ DROPS OPHTHALMIC at 03:01

## 2024-01-18 RX ADMIN — ATROPINE SULFATE 3 DROP: 10 SOLUTION/ DROPS OPHTHALMIC at 22:53

## 2024-01-18 RX ADMIN — MORPHINE SULFATE 2 MG: 2 INJECTION, SOLUTION INTRAMUSCULAR; INTRAVENOUS at 02:22

## 2024-01-18 RX ADMIN — MORPHINE SULFATE 2 MG: 2 INJECTION, SOLUTION INTRAMUSCULAR; INTRAVENOUS at 17:37

## 2024-01-18 RX ADMIN — ATROPINE SULFATE 3 DROP: 10 SOLUTION/ DROPS OPHTHALMIC at 11:00

## 2024-01-18 RX ADMIN — MORPHINE SULFATE 2 MG: 2 INJECTION, SOLUTION INTRAMUSCULAR; INTRAVENOUS at 11:50

## 2024-01-18 RX ADMIN — ATROPINE SULFATE 3 DROP: 10 SOLUTION/ DROPS OPHTHALMIC at 17:31

## 2024-01-18 RX ADMIN — MORPHINE SULFATE 2 MG: 2 INJECTION, SOLUTION INTRAMUSCULAR; INTRAVENOUS at 13:44

## 2024-01-18 RX ADMIN — ATROPINE SULFATE 3 DROP: 10 SOLUTION/ DROPS OPHTHALMIC at 21:19

## 2024-01-18 RX ADMIN — LORAZEPAM 1 MG: 2 INJECTION INTRAMUSCULAR; INTRAVENOUS at 10:21

## 2024-01-18 ASSESSMENT — PAIN SCALES - GENERAL: PAINLEVEL_OUTOF10: 0

## 2024-01-18 NOTE — PROGRESS NOTES
MEDICAL NUTRITION THERAPY- SCREENING   Jonathan Iglesias is a 89 y.o.  male     Admission Date: 1/17/2024    Principal Problem:  <principal problem not specified>    Patient nutritionally screened per comfort care plan / hospice.  Current intakes on NO DIET ORDER diet are poor (per last records while still ordered minced and moist with honey thick liquids).  Food and fluid as aids comfort is recommended.  There is no height or weight on file to calculate BMI. Overweight as inpatient.  Clinical nutrition services will continue to be available as indicated or desired by patient or family.  Follow-up and re-evaluate as needed.    Electronically signed by Hubert Jones RD, MELANIE on 1/18/2024 at 6:17 AM

## 2024-01-18 NOTE — PROGRESS NOTES
Marli from [facility] arranged transport to [facility] tomorrow at 330 pm.     Iliana Chawla RN  [facility] and [facility]   Palliative Care Nurse Coordinator  1/18/2024 4:05 PM

## 2024-01-18 NOTE — PLAN OF CARE
Problem: Safety - Adult  Goal: Free from fall injury  Outcome: Progressing  Flowsheets (Taken 1/18/2024 0159)  Free From Fall Injury: Instruct family/caregiver on patient safety     Problem: Discharge Planning  Goal: Discharge to home or other facility with appropriate resources  Outcome: Progressing  Flowsheets (Taken 1/18/2024 0159)  Discharge to home or other facility with appropriate resources:   Identify barriers to discharge with patient and caregiver   Arrange for needed discharge resources and transportation as appropriate     Problem: Skin/Tissue Integrity  Goal: Absence of new skin breakdown  Description: 1.  Monitor for areas of redness and/or skin breakdown  2.  Assess vascular access sites hourly  3.  Every 4-6 hours minimum:  Change oxygen saturation probe site  4.  Every 4-6 hours:  If on nasal continuous positive airway pressure, respiratory therapy assess nares and determine need for appliance change or resting period.  Outcome: Progressing  Note: Joaquin scale monitoring per protocol. Inspect skin for breakdown frequently. Encourage pt to make frequent large adjustments in position or assist patient with turning. Document all areas of breakdown.        Problem: ABCDS Injury Assessment  Goal: Absence of physical injury  Outcome: Progressing  Flowsheets (Taken 1/18/2024 0159)  Absence of Physical Injury: Implement safety measures based on patient assessment

## 2024-01-18 NOTE — PROGRESS NOTES
I visit with the sons Myles and Preston in Jonathan's room.  Pt has his eyes closed in bed.  [facility] hospice was in earlier and the family is happy with their care and the staff here.  We discuss end of life and talk about his plans.  They deny any further needs at this time.  Candice Gonzales RN  [facility]   Palliative Care Nurse Coordinator  1/18/2024 12:22 PM

## 2024-01-18 NOTE — PROGRESS NOTES
Writer at bedside at this time to perform shift assessment. Patient is lying in bed at this time with eyes closed. Vital signs taken and assessment completed at this time. Patient is not alert at this time and responds only to pain at this time. Patient repositioned for comfort on his left side at this time. Call light within reach, bed alarm on for safety, care ongoing.

## 2024-01-18 NOTE — H&P
APRN - Progress Note    Patient - Jonathan Iglesias  Date of Admission -  2024  4:51 PM  Date of Evaluation -  2024  Hospital Day - 1      SUBJECTIVE:     The Jonathan Iglesias is a 89 y.o. male resting in bed with son at bedside no distress.  Patient was discharged and readmitted into hospice care.  Please see H&P from 1/10/2024 for initial presentation    ROS: Unable due to decreased responsiveness      OBJECTIVE:     VITAL SIGNS:  Patient Vitals for the past 8 hrs:   BP Temp Temp src Pulse Resp SpO2   24 0740 (!) 94/52 97.4 °F (36.3 °C) Temporal 86 28 93 %           Temp: 97.4 °F (36.3 °C)  Temp range:    Temp  Av.3 °F (36.3 °C)  Min: 96.7 °F (35.9 °C)  Max: 97.7 °F (36.5 °C)    BP: (!) 94/52  BP Range:      Systolic (24hrs), Av , Min:94 , Max:132      Diastolic (24hrs), Av, Min:51, Max:56    Pulse: 86  Pulse Range:    Pulse  Av.3  Min: 71  Max: 89    Respirations: 28  Resp Range:   Resp  Av.5  Min: 26  Max: 40    SpO2: 93 % supplemental O2  SpO2 range:   SpO2  Av %  Min: 93 %  Max: 97 %        PHYSICAL EXAM:  GEN:   Eyes closed easy respirations.  Restless  EYES:  EOMI, pupils equal   NECK: Supple. No lymphadenopathy.  No carotid bruit  CVS:    regular rate and rhythm, no audible murmur  PULM: clear, no acute respiratory distress  ABD:    Bowels sounds normal.  Abdomen is soft.  No distention.  no tenderness to palpation.   EXT:   no edema bilaterally .  No calf tenderness.   NEURO: Moves all extremities.  Motor and sensory are grossly intact  SKIN:  No rashes.  No skin lesions.            DATA:    Radiology/Imaging:  No orders to display         ASSESSMENT / PLAN:     Primary Problem(s): Encounter for hospice care  Differential diagnoses: Pneumonia, UTI  Condition is an undiagnosed new problem with uncertain prognosis  Condition is stable  Treatment plan:   Appreciate [facility]  Continue [facility] care  Imaging: no further imaging studies ordered

## 2024-01-18 NOTE — PROGRESS NOTES
Spoke with Marli at [facility]. They will arrange transport for discharge tomorrow to [facility] to continue hospice care. Discussed with  ans CNP.     Iliana Chawla RN  [facility]   Palliative Care Nurse Coordinator  1/18/2024 2:12 PM

## 2024-01-18 NOTE — ACP (ADVANCE CARE PLANNING)
Advance Care Planning     Advance Care Planning Activator (Inpatient)  Conversation Note      Date of ACP Conversation: 1/18/2024     Conversation Conducted with:  Healthcare Decision Maker: Next of Kin by law (only applies in absence of above) (name) Domingo    ACP Activator: Karen Gonzales RN      Health Care Decision Maker:     Current Designated Health Care Decision Maker:     Primary Decision Maker: Myles Iglesias - Child - [phone]    Secondary Decision Maker: Preston Iglesias - Child - [phone]      Care Preferences    Ventilation:  \"If you were in your present state of health and suddenly became very ill and were unable to breathe on your own, what would your preference be about the use of a ventilator (breathing machine) if it were available to you?\"      Would the patient desire the use of ventilator (breathing machine)?: no    \"If your health worsens and it becomes clear that your chance of recovery is unlikely, what would your preference be about the use of a ventilator (breathing machine) if it were available to you?\"     Would the patient desire the use of ventilator (breathing machine)?: No      Resuscitation  \"CPR works best to restart the heart when there is a sudden event, like a heart attack, in someone who is otherwise healthy. Unfortunately, CPR does not typically restart the heart for people who have serious health conditions or who are very sick.\"    \"In the event your heart stopped as a result of an underlying serious health condition, would you want attempts to be made to restart your heart (answer \"yes\" for attempt to resuscitate) or would you prefer a natural death (answer \"no\" for do not attempt to resuscitate)?\" no       [] Yes   [] No   Educated Patient / Decision Maker regarding differences between Advance Directives and portable DNR orders.    Length of ACP Conversation in minutes:      Conversation Outcomes:  ACP discussion completed    Follow-up plan:    []

## 2024-01-19 VITALS
TEMPERATURE: 97.2 F | RESPIRATION RATE: 28 BRPM | HEART RATE: 92 BPM | SYSTOLIC BLOOD PRESSURE: 113 MMHG | OXYGEN SATURATION: 90 % | DIASTOLIC BLOOD PRESSURE: 53 MMHG

## 2024-01-19 PROCEDURE — 1250000000 HC SEMI PRIVATE HOSPICE R&B

## 2024-01-19 PROCEDURE — 6360000002 HC RX W HCPCS: Performed by: NURSE PRACTITIONER

## 2024-01-19 RX ORDER — MORPHINE SULFATE 100 MG/5ML
10 SOLUTION ORAL
Qty: 30 ML | Refills: 0 | Status: SHIPPED | OUTPATIENT
Start: 2024-01-19 | End: 2024-01-24

## 2024-01-19 RX ORDER — SCOLOPAMINE TRANSDERMAL SYSTEM 1 MG/1
1 PATCH, EXTENDED RELEASE TRANSDERMAL
DISCHARGE
Start: 2024-01-20

## 2024-01-19 RX ORDER — LORAZEPAM 2 MG/ML
0.5 CONCENTRATE ORAL EVERY 8 HOURS PRN
Qty: 30 ML | Refills: 0 | Status: SHIPPED | OUTPATIENT
Start: 2024-01-19 | End: 2024-02-28

## 2024-01-19 RX ORDER — ATROPINE SULFATE 10 MG/ML
3 SOLUTION/ DROPS OPHTHALMIC EVERY 30 MIN PRN
Qty: 10 ML | Refills: 4 | DISCHARGE
Start: 2024-01-19

## 2024-01-19 RX ADMIN — MORPHINE SULFATE 2 MG: 2 INJECTION, SOLUTION INTRAMUSCULAR; INTRAVENOUS at 14:25

## 2024-01-19 RX ADMIN — MORPHINE SULFATE 2 MG: 2 INJECTION, SOLUTION INTRAMUSCULAR; INTRAVENOUS at 11:38

## 2024-01-19 RX ADMIN — ATROPINE SULFATE 3 DROP: 10 SOLUTION/ DROPS OPHTHALMIC at 10:17

## 2024-01-19 RX ADMIN — MORPHINE SULFATE 2 MG: 2 INJECTION, SOLUTION INTRAMUSCULAR; INTRAVENOUS at 06:49

## 2024-01-19 RX ADMIN — MORPHINE SULFATE 2 MG: 2 INJECTION, SOLUTION INTRAMUSCULAR; INTRAVENOUS at 00:57

## 2024-01-19 ASSESSMENT — PAIN SCALES - GENERAL: PAINLEVEL_OUTOF10: 0

## 2024-01-19 NOTE — PROGRESS NOTES
APRN - Progress Note    Patient - Jonathan Iglesias  Date of Admission -  2024  4:51 PM  Date of Evaluation -  2024  Hospital Day - 2      SUBJECTIVE:     The Jonathan Iglesias is a 89 y.o. male resting in bed with son at bedside no distress. Calm and relaxed.  No distress    ROS: Unable due to decreased responsiveness      OBJECTIVE:     VITAL SIGNS:  Patient Vitals for the past 8 hrs:   BP Temp Temp src Pulse Resp SpO2   24 0630 (!) 113/53 97.2 °F (36.2 °C) Temporal 92 28 90 %           Temp: 97.2 °F (36.2 °C)  Temp range:    Temp  Av.4 °F (36.3 °C)  Min: 97.2 °F (36.2 °C)  Max: 97.6 °F (36.4 °C)    BP: (!) 113/53  BP Range:      Systolic (24hrs), Av , Min:113 , Max:116      Diastolic (24hrs), Av, Min:33, Max:53    Pulse: 92  Pulse Range:    Pulse  Av.5  Min: 92  Max: 97    Respirations: 28  Resp Range:   Resp  Av  Min: 28  Max: 30    SpO2: 90 % supplemental O2  SpO2 range:   SpO2  Av.5 %  Min: 87 %  Max: 90 %        PHYSICAL EXAM:  GEN:   Eyes closed easy respirations.  Calm  EYES:  EOMI, pupils equal   NECK: Supple. No lymphadenopathy.  No carotid bruit  CVS:    regular rate and rhythm, no audible murmur  PULM: clear, no acute respiratory distress  ABD:    Bowels sounds normal.  Abdomen is soft.  No distention.  no tenderness to palpation.   EXT:   no edema bilaterally .  No calf tenderness.   NEURO: Moves all extremities.  Motor and sensory are grossly intact  SKIN:  No rashes.  No skin lesions.            DATA:    Radiology/Imaging:  No orders to display         ASSESSMENT / PLAN:     Primary Problem(s): Encounter for hospice care  Differential diagnoses: Pneumonia, UTI  Condition is an undiagnosed new problem with uncertain prognosis  Condition is stable  Treatment plan:   Appreciate [facility]  Continue [facility] care  Imaging: no further imaging studies ordered today  Medications:   Continue scopolamine  Continue Ativan  Continue morphine  Medication

## 2024-01-19 NOTE — PROGRESS NOTES
Writer at bedside at this time to perform shift assessment. Patient is lying in bed at this time with eyes closed. Vital signs taken and assessment completed at this time.Patient is only responsive to pain and/or movement at this time. Atropine drops administered at this time. Patient repositioned for comfort. Call light within reach, bed alarm on for safety, care ongoing.

## 2024-01-19 NOTE — DISCHARGE INSTR - COC
Continuity of Care Form    Patient Name: Jonathan Hill   :  1934  MRN:  [id number]    Admit date:  2024  Discharge date:  24    Code Status Order: DNR-CC   Advance Directives:     Admitting Physician:  Alejandro Leung MD  PCP: Jenn Jensen, APRN - CNP    Discharging Nurse: shirley Montiel Hospital Unit/Room#: 0325/0325-01  Discharging Unit Phone Number: [phone]    Emergency Contact:   Extended Emergency Contact Information  Primary Emergency Contact: Myles Hill   [facility]  Home Phone: [phone]  Mobile Phone: [phone]  Relation: Child  Hearing or visual needs: None  Other needs: None  Preferred language: English   needed? No  Secondary Emergency Contact: Preston Hill  Mobile Phone: [phone]  Relation: Child    Past Surgical History:  Past Surgical History:   Procedure Laterality Date    CORONARY ARTERY BYPASS GRAFT  08/21/2015     X3 W/ LIMA TO LAD    DIAGNOSTIC CARDIAC CATH LAB PROCEDURE  08/17/15    OTHER SURGICAL HISTORY  2015    endovascular vein harvesting    OTHER SURGICAL HISTORY  2015     normothermic cardiopulmonary bypass w/ cardioplegic arrest of the heart    OTHER SURGICAL HISTORY      gallstones removed, Lap       Immunization History:   Immunization History   Administered Date(s) Administered    COVID-19, PFIZER PURPLE top, DILUTE for use, (age 12 y+), 30mcg/0.3mL 2021, 2021, 2021    Influenza Vaccine, unspecified formulation 10/14/2014, 2016    Influenza Virus Vaccine 10/14/2015    Influenza, FLUAD, (age 65 y+), Adjuvanted, 0.5mL 10/13/2021    Influenza, FLUZONE (age 65 y+), High Dose, 0.7mL 2022    Influenza, High Dose (Fluzone 65 yrs and older) 2016, 10/04/2017, 10/16/2018, 2021    Influenza, Triv, inactivated, subunit, adjuvanted, IM (Fluad 65 yrs and older) 2019    Pneumococcal, PCV-13, PREVNAR 13, (age 6w+), IM, 0.5mL 2016    Pneumococcal, PPSV23,

## 2024-01-19 NOTE — PROGRESS NOTES
Spiritual Services Interventions  0325/0325-01   1/19/2024  Marquita Iglesias  89 y.o. year old male    Encounter Summary  Encounter Overview/Reason : (P) Grief, Loss, and Adjustments  Service Provided For:: (P) Patient and family together  Referral/Consult From:: (P) Rounding  Support System: (P) Children, Family members  Complexity of Encounter: (P) Moderate  Begin Time: (P) 1330  End Time : (P) 1345  Total Time Calculated: (P) 15 min           Grief, Loss, and Adjustments  Type: (P) Hospice              Assessment/Intervention/Outcome  Assessment: (P) Calm  Intervention: (P) Explored/Affirmed feelings, thoughts, concerns, Grief Care, Prayer (assurance of)/[place], Active listening  Outcome: (P) Engaged in conversation, Grieving, Expressed Gratitude

## 2024-01-19 NOTE — DISCHARGE SUMMARY
Discharge Summary    Jonathan Iglesias  :  1934  MRN:  [id number]    Admit date:  2024      Discharge date: 2024     Admitting Physician:  Alejandro Leung MD    Discharge Diagnoses:    Principal Problem:    Encounter for hospice care  Active Problems:    CKD (chronic kidney disease) stage 4, GFR 15-29 ml/min (HCC)    Severe sepsis (HCC)    Type 2 MI (myocardial infarction) (HCC)    Acute kidney injury superimposed on CKD (HCC)  Resolved Problems:    * No resolved hospital problems. *      Hospital Course:   Jonathan Iglesias is a 89 y.o. male admitted with severe sepsis.  He presented to the emergency room for evaluation of hypoxia from [facility].  They reported that his SpO2 was 80% on 4 L.  They reported hypotension of 85/63.  The patient was recently diagnosed with COVID-19 5 days prior.  He also had a hospitalization on  for rhabdomyolysis.  Facility also reported patient having diarrhea.  During patient's workup chest x-ray showed atypical pneumonia.  Patient's VBG showed a pH of 7.408, pCO2 39.7, pO2 28.3 and bicarb of 24.5.  Patient's sodium was 150 and patient was found to have MARYLU with a BUN of 43 and a creatinine of 2.9.  Initial lactic acid was 3.6 following fluid resuscitation decreased to 2.7.  Patient's proBNP was 1034.  Troponin was elevated at 176 followed by 153.  Patient was also found to have elevated LFTs with an alk phos of 138, ALT 69, AST of 66 but total bilirubin was normal at 0.5.  No leukocytosis was seen.  Urinalysis was negative for UTI.  Patient was placed on high flow oxygen at 55 L at 92% FiO2.  Patient was febrile at Tmax 100.4, tachypneic at 24 hypotensive 85/42.  During patient's admission nephrology was consulted as well as cardiology.  Condition continued to wax and wane.  He was found to have a type II MI and acute renal failure.  Electrolytes have been replaced.  Respiratory status had improved.  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    Hyperglycemia 01/03/2017    Hyperlipidemia 01/03/2017    Chronic renal failure, stage 3 (moderate) ([facility]) 01/03/2017    Avulsion fracture 09/23/2016    Essential hypertension 09/23/2016    Localized osteoarthrosis, lower leg 10/14/2015    ASHD (arteriosclerotic heart disease) 10/14/2015    Insomnia 10/14/2015    Anxiety 10/14/2015    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 Max Daily Amount: 1.5 mg     morphine sulfate 20 MG/ML concentrated oral solution  Take 0.5 mLs by mouth every 2 hours as needed for Pain for up to 5 days. Max Daily Amount: 120 mg     scopolamine transdermal patch  Commonly known as: TRANSDERM-SCOP  Place 1 patch onto the skin every 72 hours  Start taking on: January 20, 2024            STOP taking these medications      amiodarone 200 MG tablet  Commonly known as: CORDARONE     aspirin 81 MG tablet     atorvastatin 20 MG tablet  Commonly known as: LIPITOR     buPROPion 100 MG extended release tablet  Commonly known as: Wellbutrin SR     finasteride 5 MG tablet  Commonly known as: PROSCAR     fish oil 1000 MG capsule     levothyroxine 88 MCG tablet  Commonly known as: SYNTHROID     midodrine 5 MG tablet  Commonly known as: PROAMATINE     MULTI VITAMIN DAILY PO     polyethylene glycol 17 GM/SCOOP powder  Commonly known as: GLYCOLAX     tamsulosin 0.4 MG

## 2024-01-19 NOTE — PROGRESS NOTES
Patient will be discharge to [facility] today with [facility]. He will go by ambulance @ 330 PM.    RESHMA Banks

## 2024-02-09 NOTE — PATIENT INSTRUCTIONS
SURVEY:    You may be receiving a survey from [employer] regarding your visit today. Please complete the survey to enable us to provide the highest quality of care to you and your family. If you cannot score us a very good on any question, please call the office to discuss how we could of made your experience a very good one. Thank you.
Home